# Patient Record
Sex: FEMALE | Race: WHITE | NOT HISPANIC OR LATINO | Employment: UNEMPLOYED | URBAN - METROPOLITAN AREA
[De-identification: names, ages, dates, MRNs, and addresses within clinical notes are randomized per-mention and may not be internally consistent; named-entity substitution may affect disease eponyms.]

---

## 2017-03-09 ENCOUNTER — OFFICE VISIT (OUTPATIENT)
Dept: FAMILY MEDICINE CLINIC | Facility: CLINIC | Age: 42
End: 2017-03-09

## 2017-03-09 VITALS
SYSTOLIC BLOOD PRESSURE: 128 MMHG | WEIGHT: 195 LBS | HEIGHT: 63 IN | RESPIRATION RATE: 16 BRPM | BODY MASS INDEX: 34.55 KG/M2 | HEART RATE: 76 BPM | TEMPERATURE: 99.8 F | OXYGEN SATURATION: 97 % | DIASTOLIC BLOOD PRESSURE: 80 MMHG

## 2017-03-09 DIAGNOSIS — N89.8 VAGINAL LESION: ICD-10-CM

## 2017-03-09 DIAGNOSIS — J06.9 ACUTE URI: Primary | ICD-10-CM

## 2017-03-09 LAB
EXPIRATION DATE: NORMAL
FLUAV AG NPH QL: NEGATIVE
FLUBV AG NPH QL: NEGATIVE
INTERNAL CONTROL: NORMAL
Lab: NORMAL

## 2017-03-09 PROCEDURE — 99214 OFFICE O/P EST MOD 30 MIN: CPT | Performed by: FAMILY MEDICINE

## 2017-03-09 PROCEDURE — 87804 INFLUENZA ASSAY W/OPTIC: CPT | Performed by: FAMILY MEDICINE

## 2017-03-09 NOTE — PROGRESS NOTES
"Subjective   Kenyetat Montemayor is a 41 y.o. female.     History of Present Illness   Kenyetta is here w/ 2 issues  1) Cold sx x 5 days.  Mild fever, cough and sinus pain/pressure.  She is using a sinus rinse.She  Feels she is getting worse.  2)She has noticed a lesion in her vaginal area.  She states it feels \"tingely\".I thas been prsent for about 2 weeks and is not improving. Not particularly painful but a little uncomfortable. She is sexually active in a longterm , heterosexual, monogamous relationship.    The following portions of the patient's history were reviewed and updated as appropriate: allergies, current medications, past medical history, past social history and past surgical history.    Review of Systems   HENT: Positive for congestion and sinus pressure.    Genitourinary: Positive for frequency.   All other systems reviewed and are negative.      Objective   Physical Exam   Constitutional: She is oriented to person, place, and time. She appears well-developed and well-nourished. No distress.   HENT:   Head: Normocephalic and atraumatic.   Right Ear: External ear normal.   Left Ear: External ear normal.   Nose: Nose normal.   Mouth/Throat: Oropharynx is clear and moist. No oropharyngeal exudate.   Eyes: Conjunctivae and EOM are normal. Pupils are equal, round, and reactive to light.   Neck: Normal range of motion.   Cardiovascular: Normal rate and regular rhythm.    Pulmonary/Chest: Effort normal and breath sounds normal. No stridor. No respiratory distress. She has no wheezes.   Genitourinary:   Genitourinary Comments: An area of excoriation noted at introitus with no papules or vesicles noted.   Lymphadenopathy:     She has no cervical adenopathy.   Neurological: She is alert and oriented to person, place, and time.   Skin: Skin is warm and dry. No rash noted.   Nursing note and vitals reviewed.      Assessment/Plan   Kenyetta was seen today for mass and uri.    Diagnoses and all orders for this " visit:    Acute URI  -     POCT Influenza A/B  FLu negative, advised supportive .  Vaginal lesion  I am not sure what this is but she will f/u with her gynecologist.

## 2017-03-09 NOTE — PATIENT INSTRUCTIONS
Call Dr. Parag austin ask to be seen.   Over the counter meds to treat your upper respiratory symptoms. Please call if you are not improving.

## 2017-03-13 ENCOUNTER — OFFICE VISIT (OUTPATIENT)
Dept: OBSTETRICS AND GYNECOLOGY | Age: 42
End: 2017-03-13

## 2017-03-13 VITALS
BODY MASS INDEX: 33.8 KG/M2 | DIASTOLIC BLOOD PRESSURE: 76 MMHG | HEIGHT: 64 IN | SYSTOLIC BLOOD PRESSURE: 110 MMHG | WEIGHT: 198 LBS

## 2017-03-13 DIAGNOSIS — Z80.3 FAMILY HISTORY OF BREAST CANCER IN MOTHER: ICD-10-CM

## 2017-03-13 DIAGNOSIS — N90.89 VULVAR LESION: Primary | ICD-10-CM

## 2017-03-13 PROCEDURE — 99213 OFFICE O/P EST LOW 20 MIN: CPT | Performed by: OBSTETRICS & GYNECOLOGY

## 2017-03-13 NOTE — PROGRESS NOTES
"GYN Visit    3/13/2017    Patient: Kenyetta Montemayor          MR#:[id number]      Chief Complaint   Patient presents with   • Gynecologic Exam     PT HERE BECAUSE SHE FOUND A \"VAGINAL LESION\". DOES NOT HURT, NO HX OF HERPES.       History of Present Illness    41 y.o. female No obstetric history on file. who presents for  Vulvar lesion  Saw it and looks like a small ulcer  Not itchy or tender, noticed it a week ago  Also wants to do MY RISK  Mother was tested for PMS2 but not BRCA 1 and 2        Patient's last menstrual period was 02/25/2017 (exact date).    ________________________________________  Patient Active Problem List   Diagnosis   • Family history of breast cancer in mother       Past Medical History   Diagnosis Date   • Anxiety    • Atypical squamous cells of undetermined significance (ASCUS) on Papanicolaou smear of cervix    • H/O dysmenorrhea    • H/O infertility    • H/O seasonal allergies    • Ovarian cyst    • Pelvic pain in female    • PMS (premenstrual syndrome)        Past Surgical History   Procedure Laterality Date   • Mouth surgery     • Sinus surgery         History   Smoking Status   • Never Smoker   Smokeless Tobacco   • Never Used       has a current medication list which includes the following prescription(s): azelastine, budesonide, diazepam, montelukast, and triamcinolone acetonide.  ________________________________________    Current contraception: none      The following portions of the patient's history were reviewed and updated as appropriate: allergies, current medications, past family history, past medical history, past social history, past surgical history and problem list.    Review of Systems    Pertinent items are noted in HPI.     Objective   Physical Exam    Visit Vitals   • /76   • Ht 64\" (162.6 cm)   • Wt 198 lb (89.8 kg)   • LMP 02/25/2017 (Exact Date)   • Breastfeeding No   • BMI 33.99 kg/m2      BP Readings from Last 3 Encounters:   03/13/17 110/76   03/09/17 128/80 " "  08/29/16 120/80      Wt Readings from Last 3 Encounters:   03/13/17 198 lb (89.8 kg)   03/09/17 195 lb (88.5 kg)   08/29/16 192 lb (87.1 kg)      BMI: Estimated body mass index is 33.99 kg/(m^2) as calculated from the following:    Height as of this encounter: 64\" (162.6 cm).    Weight as of this encounter: 198 lb (89.8 kg).      General:   alert, appears stated age and cooperative   Abdomen: soft, non-tender, without masses or organomegaly       Vulva: normal, area of concern appears normal.  no ulcer or leukoplakia, perhaps a prominent white line at mucosal change but no evidence lesion or ulcer   Vagina: normal mucosa                 Assessment:      Kenyetta was seen today for gynecologic exam.    Diagnoses and all orders for this visit:    Vulvar lesion    Family history of breast cancer in mother      MY RISK today  No evidence vulvar lesion, nothing to swab or biopsy, pt is reassured      "

## 2017-03-24 ENCOUNTER — TELEPHONE (OUTPATIENT)
Dept: FAMILY MEDICINE CLINIC | Facility: CLINIC | Age: 42
End: 2017-03-24

## 2017-03-24 RX ORDER — DIAZEPAM 5 MG/1
5 TABLET ORAL 2 TIMES DAILY PRN
Qty: 20 TABLET | Refills: 0 | OUTPATIENT
Start: 2017-03-24 | End: 2019-04-25 | Stop reason: SDUPTHER

## 2017-03-24 NOTE — TELEPHONE ENCOUNTER
Patient left a VM requesting RF for Valium.  FLORINDA has been scanned and placed in your folder for review.  Patient is due for an OV to discuss medications.  Controlled Sub. Contract needs to be updated at next OV.

## 2017-03-28 ENCOUNTER — TELEPHONE (OUTPATIENT)
Dept: OBSTETRICS AND GYNECOLOGY | Age: 42
End: 2017-03-28

## 2017-03-30 ENCOUNTER — TELEPHONE (OUTPATIENT)
Dept: OBSTETRICS AND GYNECOLOGY | Age: 42
End: 2017-03-30

## 2017-04-03 DIAGNOSIS — Z00.00 ROUTINE GENERAL MEDICAL EXAMINATION AT A HEALTH CARE FACILITY: Primary | ICD-10-CM

## 2017-04-03 DIAGNOSIS — Z13.220 SCREENING FOR LIPOID DISORDERS: ICD-10-CM

## 2017-04-04 ENCOUNTER — LAB (OUTPATIENT)
Dept: FAMILY MEDICINE CLINIC | Facility: CLINIC | Age: 42
End: 2017-04-04

## 2017-04-04 DIAGNOSIS — Z13.220 SCREENING FOR LIPOID DISORDERS: ICD-10-CM

## 2017-04-04 DIAGNOSIS — Z00.00 ROUTINE GENERAL MEDICAL EXAMINATION AT A HEALTH CARE FACILITY: ICD-10-CM

## 2017-04-04 LAB
ALBUMIN SERPL-MCNC: 4.1 G/DL (ref 3.5–5.2)
ALBUMIN/GLOB SERPL: 1.5 G/DL
ALP SERPL-CCNC: 57 U/L (ref 39–117)
ALT SERPL-CCNC: 25 U/L (ref 1–33)
AST SERPL-CCNC: 18 U/L (ref 1–32)
BILIRUB SERPL-MCNC: 0.2 MG/DL (ref 0.1–1.2)
BUN SERPL-MCNC: 13 MG/DL (ref 6–20)
BUN/CREAT SERPL: 18.3 (ref 7–25)
CALCIUM SERPL-MCNC: 9.4 MG/DL (ref 8.6–10.5)
CHLORIDE SERPL-SCNC: 100 MMOL/L (ref 98–107)
CHOLEST SERPL-MCNC: 236 MG/DL (ref 0–200)
CO2 SERPL-SCNC: 24.9 MMOL/L (ref 22–29)
CREAT SERPL-MCNC: 0.71 MG/DL (ref 0.57–1)
ERYTHROCYTE [DISTWIDTH] IN BLOOD BY AUTOMATED COUNT: 14 % (ref 11.7–13)
GLOBULIN SER CALC-MCNC: 2.7 GM/DL
GLUCOSE SERPL-MCNC: 96 MG/DL (ref 65–99)
HCT VFR BLD AUTO: 40 % (ref 35.6–45.5)
HDLC SERPL-MCNC: 53 MG/DL (ref 40–60)
HGB BLD-MCNC: 12.8 G/DL (ref 11.9–15.5)
LDLC SERPL CALC-MCNC: 125 MG/DL (ref 0–100)
LDLC/HDLC SERPL: 2.36 {RATIO}
MCH RBC QN AUTO: 29.6 PG (ref 26.9–32)
MCHC RBC AUTO-ENTMCNC: 32 G/DL (ref 32.4–36.3)
MCV RBC AUTO: 92.4 FL (ref 80.5–98.2)
PLATELET # BLD AUTO: 343 10*3/MM3 (ref 140–500)
POTASSIUM SERPL-SCNC: 4.5 MMOL/L (ref 3.5–5.2)
PROT SERPL-MCNC: 6.8 G/DL (ref 6–8.5)
RBC # BLD AUTO: 4.33 10*6/MM3 (ref 3.9–5.2)
SODIUM SERPL-SCNC: 139 MMOL/L (ref 136–145)
TRIGL SERPL-MCNC: 290 MG/DL (ref 0–150)
VLDLC SERPL CALC-MCNC: 58 MG/DL (ref 5–40)
WBC # BLD AUTO: 8.55 10*3/MM3 (ref 4.5–10.7)

## 2017-04-11 ENCOUNTER — OFFICE VISIT (OUTPATIENT)
Dept: FAMILY MEDICINE CLINIC | Facility: CLINIC | Age: 42
End: 2017-04-11

## 2017-04-11 VITALS
OXYGEN SATURATION: 98 % | HEART RATE: 72 BPM | WEIGHT: 200 LBS | HEIGHT: 64 IN | BODY MASS INDEX: 34.15 KG/M2 | RESPIRATION RATE: 16 BRPM

## 2017-04-11 DIAGNOSIS — Z00.00 ROUTINE GENERAL MEDICAL EXAMINATION AT A HEALTH CARE FACILITY: Primary | ICD-10-CM

## 2017-04-11 DIAGNOSIS — E78.49 OTHER HYPERLIPIDEMIA: ICD-10-CM

## 2017-04-11 PROCEDURE — 99395 PREV VISIT EST AGE 18-39: CPT | Performed by: FAMILY MEDICINE

## 2017-04-11 RX ORDER — CETIRIZINE HYDROCHLORIDE 10 MG/1
10 TABLET ORAL DAILY
COMMUNITY

## 2017-04-11 NOTE — PROGRESS NOTES
"Preventive Exam    History of Present Illness: Kenyetta is here for check up and review of routine health maintenance. She states she is doing well and has no concerns.    REVIEW OF SYSTEMS  Constitutional: Negative.    HENT: Negative.    Eyes: Negative.    Respiratory: Negative.    Cardiovascular: Negative.    Gastrointestinal: Negative.    Endocrine: Negative.    Genitourinary: Negative.    Musculoskeletal: Negative for neck stiffness.   Skin: Negative.    Allergic/Immunologic: Negative.    Neurological: Negative.    Hematological: Negative.    Psychiatric/Behavioral: Negative.    All other systems reviewed and are negative.          PHYSICAL EXAM    Vitals:    04/11/17 1115   Pulse: 72   Resp: 16   SpO2: 98%   Weight: 200 lb (90.7 kg)   Height: 64\" (162.6 cm)   BP:                     140/90  GENERAL: alert and oriented, afebrile and vital signs stable  HEENT: oral mucosa moist, PEERLA, EOM, conjunctiva normal  No cervical adenopathy  LUNGS: clear to ascultation bilaterally, no rales, ronchi or wheezing  HEART: RRR S1 S2 without murmers, thrills, rubs or gallops  CHEST WALL: within normal limits, no tenderness  BREAST EXAM: No masses, skin changes, tenderness or discharge noted  ABDOMEN: WNL. Normal BS.  EXTREMITIES: No clubbing, cyanosis or edema noted. Normal Pulses.  SKIN: warm, dry, no rashes noted  NEURO: CN II- XII grossly intact    ASSESSMENT AND PLAN  Problem List Items Addressed This Visit     None      Visit Diagnoses     Routine general medical examination at a health care facility    -  Primary    Other hyperlipidemia      I have advised diet and exercise changes. Follow up in 3 months.        Routine health maintenance reviewed and discussed with Kenyetta.    .No orders of the defined types were placed in this encounter.    Return in about 3 months (around 7/11/2017) for Recheck.  "

## 2017-04-11 NOTE — PATIENT INSTRUCTIONS
Please come back in 3 months for a weight check and cholesterol check.      Personal Prevention Plan of Service     Date of Office Visit:  2017  Encounter Provider:  Jorge Luis White MD  Place of Service:  [facility]  Patient Name: Kenyetta Montemayor  :  1975    As part of the preventive portion of your visit today, we are providing you with this personalized preventive plan of services (PPPS). This plan is based upon recommendations of the [place] Preventive Services Task Force (USPSTF) and the Advisory Committee on Immunization Practices (ACIP).    This lists the preventive care services that should be considered, and provides dates of when you are due. Items listed as completed are up-to-date and do not require any further intervention.    Health Maintenance   Topic Date Due   • LIPID PANEL  2018   • PAP SMEAR  2018   • TDAP/TD VACCINES (2 - Td) 2026   • INFLUENZA VACCINE  Addressed       No orders of the defined types were placed in this encounter.      Return in about 3 months (around 2017) for Recheck.

## 2017-04-27 ENCOUNTER — PATIENT MESSAGE (OUTPATIENT)
Dept: FAMILY MEDICINE CLINIC | Facility: CLINIC | Age: 42
End: 2017-04-27

## 2017-05-01 ENCOUNTER — TELEPHONE (OUTPATIENT)
Dept: OBSTETRICS AND GYNECOLOGY | Age: 42
End: 2017-05-01

## 2017-10-23 ENCOUNTER — OFFICE VISIT (OUTPATIENT)
Dept: OBSTETRICS AND GYNECOLOGY | Age: 42
End: 2017-10-23

## 2017-10-23 VITALS
BODY MASS INDEX: 31.82 KG/M2 | SYSTOLIC BLOOD PRESSURE: 118 MMHG | WEIGHT: 198 LBS | DIASTOLIC BLOOD PRESSURE: 76 MMHG | HEIGHT: 66 IN

## 2017-10-23 DIAGNOSIS — N94.3 PMS (PREMENSTRUAL SYNDROME): ICD-10-CM

## 2017-10-23 DIAGNOSIS — Z01.419 ENCOUNTER FOR GYNECOLOGICAL EXAMINATION WITHOUT ABNORMAL FINDING: Primary | ICD-10-CM

## 2017-10-23 PROCEDURE — 99396 PREV VISIT EST AGE 40-64: CPT | Performed by: OBSTETRICS & GYNECOLOGY

## 2017-10-23 RX ORDER — FLUOXETINE 10 MG/1
10 CAPSULE ORAL DAILY
Qty: 30 CAPSULE | Refills: 12 | Status: SHIPPED | OUTPATIENT
Start: 2017-10-23 | End: 2017-12-06 | Stop reason: SDUPTHER

## 2017-10-23 NOTE — PROGRESS NOTES
"Routine Annual Visit    10/23/2017    Patient: Kenyetta Montemayor          MR#:[id number]      Chief Complaint   Patient presents with   • Annual Exam     PT HERE FOR ROUTINE AE AND MG. SHE IS FEELING LIKE EVERY YEAR SHE GETS MORE \"CRAMPY\" AND \"PEDRO\". LAST PAP  (NEG AND NEG HPV).       History of Present Illness    42 y.o. female No obstetric history on file. who presents for annual exam.   Went to [place], had lots of travel advice  Menses regular, worseninp PMS  Will try prozac  mammo today  My risk was negative  Pap UTD          Patient's last menstrual period was 10/03/2017 (exact date).  Obstetric History:  OB History      Para Term  AB Living      0       SAB TAB Ectopic Multiple Live Births                 Menstrual History:     Patient's last menstrual period was 10/03/2017 (exact date).       Sexual History:       ________________________________________  Patient Active Problem List   Diagnosis   • Family history of breast cancer in mother       Past Medical History:   Diagnosis Date   • Allergic     many seasonal/indoor   • Allergic rhinitis    • Anxiety    • Asthma ?   • Atypical squamous cells of undetermined significance (ASCUS) on Papanicolaou smear of cervix    • Depression     very sad after my mom was diagnosed with brain cancer   • GERD (gastroesophageal reflux disease)     sometimes   • H/O dysmenorrhea    • H/O infertility    • H/O seasonal allergies    • Ovarian cyst    • Pelvic pain in female    • PMS (premenstrual syndrome)    • Scoliosis     very mild       Past Surgical History:   Procedure Laterality Date   • MOUTH SURGERY     • SEPTOPLASTY  2016   • SINUS SURGERY         History   Smoking Status   • Never Smoker   Smokeless Tobacco   • Never Used       has a current medication list which includes the following prescription(s): ALLERGY SERUM SUBLINGUAL DROPS, azelastine, budesonide, cetirizine, diazepam, fluoxetine, montelukast, and triamcinolone " "acetonide.  ________________________________________    Current contraception: none  History of abnormal Pap smear: no  Family history of Breast cancer: mother age 48  Family history of uterine or ovarian cancer: no  Family History of colon cancer/colon polyps: yes - GGF  History of abnormal mammogram: no      The following portions of the patient's history were reviewed and updated as appropriate: allergies, current medications, past family history, past medical history, past social history, past surgical history and problem list.    Review of Systems    Pertinent items are noted in HPI.     Objective   Physical Exam    /76  Ht 66\" (167.6 cm)  Wt 198 lb (89.8 kg)  LMP 10/03/2017 (Exact Date)  BMI 31.96 kg/m2   BP Readings from Last 3 Encounters:   10/23/17 118/76   03/13/17 110/76   03/09/17 128/80      Wt Readings from Last 3 Encounters:   10/23/17 198 lb (89.8 kg)   04/11/17 200 lb (90.7 kg)   03/13/17 198 lb (89.8 kg)      BMI: Estimated body mass index is 31.96 kg/(m^2) as calculated from the following:    Height as of this encounter: 66\" (167.6 cm).    Weight as of this encounter: 198 lb (89.8 kg).      General:   alert, appears stated age and cooperative   Abdomen: soft, non-tender, without masses or organomegaly   Breast: inspection negative, no nipple discharge or bleeding, no masses or nodularity palpable   Vulva: normal   Vagina: normal mucosa   Cervix: no cervical motion tenderness and no lesions   Uterus: normal size, mobile or non-tender   Adnexa: no mass, fullness, tenderness     Assessment:    1. Normal annual exam   Assessment     ICD-10-CM ICD-9-CM   1. Encounter for gynecological examination without abnormal finding Z01.419 V72.31   2. PMS (premenstrual syndrome) N94.3 625.4     Plan:    Plan     [x]  Mammogram request made  []  PAP done  []  Labs:   []  GC/Chl/TV  []  DEXA scan   []  Referral for colonoscopy:       Kenyetta was seen today for annual exam.    Diagnoses and all orders for " this visit:    Encounter for gynecological examination without abnormal finding    PMS (premenstrual syndrome)    Other orders  -     FLUoxetine (PROzac) 10 MG capsule; Take 1 capsule by mouth Daily.            Counseling:  --Nutrition: Stressed importance of moderation and caloric balance, stressed fresh fruit and vegetables  --Exercise: Stressed the importance of regular exercise. 3-5 times weekly   - Discussed screening mammogram recommendations.   --Discussed benefits of screening colonoscopy- age 50 unless FH  --Discussed pap smear screening recommendations

## 2017-12-06 ENCOUNTER — OFFICE VISIT (OUTPATIENT)
Dept: FAMILY MEDICINE CLINIC | Facility: CLINIC | Age: 42
End: 2017-12-06

## 2017-12-06 VITALS
DIASTOLIC BLOOD PRESSURE: 86 MMHG | HEART RATE: 84 BPM | OXYGEN SATURATION: 98 % | BODY MASS INDEX: 31.5 KG/M2 | WEIGHT: 196 LBS | HEIGHT: 66 IN | SYSTOLIC BLOOD PRESSURE: 136 MMHG

## 2017-12-06 DIAGNOSIS — J06.9 ACUTE URI: Primary | ICD-10-CM

## 2017-12-06 DIAGNOSIS — J45.20 MILD INTERMITTENT ASTHMA, UNSPECIFIED WHETHER COMPLICATED: ICD-10-CM

## 2017-12-06 PROBLEM — J45.909 ASTHMA: Status: ACTIVE | Noted: 2017-12-06

## 2017-12-06 PROCEDURE — 99213 OFFICE O/P EST LOW 20 MIN: CPT | Performed by: FAMILY MEDICINE

## 2017-12-06 RX ORDER — AZITHROMYCIN 250 MG/1
TABLET, FILM COATED ORAL
Qty: 6 TABLET | Refills: 0 | Status: SHIPPED | OUTPATIENT
Start: 2017-12-06 | End: 2018-10-29

## 2017-12-07 RX ORDER — FLUOXETINE 10 MG/1
CAPSULE ORAL
Qty: 30 CAPSULE | Refills: 0 | Status: SHIPPED | OUTPATIENT
Start: 2017-12-07 | End: 2018-10-29 | Stop reason: SDUPTHER

## 2018-10-29 ENCOUNTER — APPOINTMENT (OUTPATIENT)
Dept: WOMENS IMAGING | Facility: HOSPITAL | Age: 43
End: 2018-10-29

## 2018-10-29 ENCOUNTER — OFFICE VISIT (OUTPATIENT)
Dept: OBSTETRICS AND GYNECOLOGY | Age: 43
End: 2018-10-29

## 2018-10-29 ENCOUNTER — PROCEDURE VISIT (OUTPATIENT)
Dept: OBSTETRICS AND GYNECOLOGY | Age: 43
End: 2018-10-29

## 2018-10-29 VITALS
BODY MASS INDEX: 33.27 KG/M2 | HEIGHT: 66 IN | SYSTOLIC BLOOD PRESSURE: 125 MMHG | DIASTOLIC BLOOD PRESSURE: 72 MMHG | WEIGHT: 207 LBS

## 2018-10-29 DIAGNOSIS — Z12.31 VISIT FOR SCREENING MAMMOGRAM: Primary | ICD-10-CM

## 2018-10-29 DIAGNOSIS — Z01.419 ENCOUNTER FOR GYNECOLOGICAL EXAMINATION WITHOUT ABNORMAL FINDING: Primary | ICD-10-CM

## 2018-10-29 PROCEDURE — 99396 PREV VISIT EST AGE 40-64: CPT | Performed by: OBSTETRICS & GYNECOLOGY

## 2018-10-29 PROCEDURE — 77067 SCR MAMMO BI INCL CAD: CPT | Performed by: OBSTETRICS & GYNECOLOGY

## 2018-10-29 PROCEDURE — 77067 SCR MAMMO BI INCL CAD: CPT | Performed by: RADIOLOGY

## 2018-10-29 RX ORDER — FLUOXETINE 10 MG/1
10 CAPSULE ORAL DAILY
Qty: 90 CAPSULE | Refills: 3 | Status: SHIPPED | OUTPATIENT
Start: 2018-10-29 | End: 2019-11-19 | Stop reason: SDUPTHER

## 2018-10-29 NOTE — PROGRESS NOTES
Routine Annual Visit    10/29/2018    Patient: Kenyetta Montemayor          MR#:[id number]      Chief Complaint   Patient presents with   • Gynecologic Exam     annual visit .mmg today       History of Present Illness    43 y.o. female  who presents for annual exam.   mammo today  Pap UTD- due next year  Menses are regular but heavy with clots  Was using the cup but got stuck twice and now only pads  Receptive to kyleena as option to control cycles   with VAS  Likes prozac and wants to continue          Patient's last menstrual period was 10/05/2018.  Obstetric History:  OB History      Para Term  AB Living    0 0 0 0 0 0    SAB TAB Ectopic Molar Multiple Live Births    0 0 0 0 0 0         Menstrual History:     Patient's last menstrual period was 10/05/2018.       Sexual History:       ________________________________________  Patient Active Problem List   Diagnosis   • Family history of breast cancer in mother   • Asthma       Past Medical History:   Diagnosis Date   • Allergic     many seasonal/indoor   • Allergic rhinitis    • Anxiety    • Asthma ?   • Atypical squamous cells of undetermined significance (ASCUS) on Papanicolaou smear of cervix    • Depression     very sad after my mom was diagnosed with brain cancer   • GERD (gastroesophageal reflux disease)     sometimes   • H/O dysmenorrhea    • H/O infertility    • H/O seasonal allergies    • Ovarian cyst    • Pelvic pain in female    • PMS (premenstrual syndrome)    • Scoliosis     very mild       Past Surgical History:   Procedure Laterality Date   • MOUTH SURGERY     • SEPTOPLASTY  2016   • SINUS SURGERY         History   Smoking Status   • Never Smoker   Smokeless Tobacco   • Never Used       has a current medication list which includes the following prescription(s): ALLERGY SERUM SUBLINGUAL DROPS, azelastine, budesonide, cetirizine, diazepam, fluoxetine, montelukast, and triamcinolone  "acetonide.  ________________________________________    Current contraception: vasectomy  History of abnormal Pap smear: no  Family history of Breast cancer: yes, pt tested my risk neg  Family history of uterine or ovarian cancer: no  Family History of colon cancer/colon polyps: no  History of abnormal mammogram: no      The following portions of the patient's history were reviewed and updated as appropriate: allergies, current medications, past family history, past medical history, past social history, past surgical history and problem list.    Review of Systems    Pertinent items are noted in HPI.     Objective   Physical Exam    /72   Ht 167.6 cm (65.98\")   Wt 93.9 kg (207 lb)   LMP 10/05/2018   BMI 33.43 kg/m²    BP Readings from Last 3 Encounters:   10/29/18 125/72   12/06/17 136/86   10/23/17 118/76      Wt Readings from Last 3 Encounters:   10/29/18 93.9 kg (207 lb)   12/06/17 88.9 kg (196 lb)   10/23/17 89.8 kg (198 lb)      BMI: Estimated body mass index is 33.43 kg/m² as calculated from the following:    Height as of this encounter: 167.6 cm (65.98\").    Weight as of this encounter: 93.9 kg (207 lb).      General:   alert, appears stated age and cooperative   Abdomen: soft, non-tender, without masses or organomegaly   Breast: inspection negative, no nipple discharge or bleeding, no masses or nodularity palpable   Vulva: normal   Vagina: normal mucosa   Cervix: no cervical motion tenderness and no lesions   Uterus: normal size, mobile, non-tender or retroverted   Adnexa: no mass, fullness, tenderness     Assessment:    1. Normal annual exam   Assessment     ICD-10-CM ICD-9-CM   1. Encounter for gynecological examination without abnormal finding Z01.419 V72.31     Plan:    Plan     [x]  Mammogram request made  []  PAP done  []  Labs:   []  GC/Chl/TV  []  DEXA scan   []  Referral for colonoscopy:       Kenyetta was seen today for gynecologic exam.    Diagnoses and all orders for this " visit:    Encounter for gynecological examination without abnormal finding    Other orders  -     FLUoxetine (PROzac) 10 MG capsule; Take 1 capsule by mouth Daily.            Counseling:  --Nutrition: Stressed importance of moderation and caloric balance, stressed fresh fruit and vegetables  --Exercise: Stressed the importance of regular exercise. 3-5 times weekly   - Discussed screening mammogram recommendations.   --Discussed benefits of screening colonoscopy- age 45 unless FH  --Discussed pap smear screening recommendations

## 2018-12-05 ENCOUNTER — OFFICE VISIT (OUTPATIENT)
Dept: OBSTETRICS AND GYNECOLOGY | Age: 43
End: 2018-12-05

## 2018-12-05 VITALS
SYSTOLIC BLOOD PRESSURE: 142 MMHG | WEIGHT: 206 LBS | DIASTOLIC BLOOD PRESSURE: 82 MMHG | BODY MASS INDEX: 34.32 KG/M2 | HEIGHT: 65 IN

## 2018-12-05 DIAGNOSIS — Z30.430 ENCOUNTER FOR IUD INSERTION: Primary | ICD-10-CM

## 2018-12-05 LAB
B-HCG UR QL: NEGATIVE
INTERNAL NEGATIVE CONTROL: NEGATIVE
INTERNAL POSITIVE CONTROL: POSITIVE
Lab: NORMAL

## 2018-12-05 PROCEDURE — 81025 URINE PREGNANCY TEST: CPT | Performed by: OBSTETRICS & GYNECOLOGY

## 2018-12-05 PROCEDURE — 58300 INSERT INTRAUTERINE DEVICE: CPT | Performed by: OBSTETRICS & GYNECOLOGY

## 2018-12-05 NOTE — PROGRESS NOTES
GYN Visit    2018    Patient: Kenyetta Montemayor          MR#:[id number]      Chief Complaint   Patient presents with   • Contraception     PT HERE FOR KYLEENA INSERT, SHE IS WELL.       History of Present Illness    43 y.o. female  who presents for  IUD insert to control heavy cycles  husb with vas        Patient's last menstrual period was 2018.    ________________________________________  Patient Active Problem List   Diagnosis   • Family history of breast cancer in mother   • Asthma       Past Medical History:   Diagnosis Date   • Allergic     many seasonal/indoor   • Allergic rhinitis    • Anxiety    • Asthma ?   • Atypical squamous cells of undetermined significance (ASCUS) on Papanicolaou smear of cervix    • Depression     very sad after my mom was diagnosed with brain cancer   • GERD (gastroesophageal reflux disease)     sometimes   • H/O dysmenorrhea    • H/O infertility    • H/O seasonal allergies    • Ovarian cyst    • Pelvic pain in female    • PMS (premenstrual syndrome)    • Scoliosis     very mild       Past Surgical History:   Procedure Laterality Date   • MOUTH SURGERY     • SEPTOPLASTY  2016   • SINUS SURGERY         Social History     Tobacco Use   Smoking Status Never Smoker   Smokeless Tobacco Never Used       has a current medication list which includes the following prescription(s): ALLERGY SERUM SUBLINGUAL DROPS, azelastine, budesonide, cetirizine, diazepam, fluoxetine, montelukast, and triamcinolone acetonide, and the following Facility-Administered Medications: levonorgestrel.  ________________________________________    Current contraception: vasectomy      The following portions of the patient's history were reviewed and updated as appropriate: allergies, current medications, past family history, past medical history, past social history, past surgical history and problem list.    Review of Systems    Pertinent items are noted in HPI.     Objective   Physical  "Exam    /82   Ht 165.1 cm (65\")   Wt 93.4 kg (206 lb)   LMP 12/01/2018   BMI 34.28 kg/m²    BP Readings from Last 3 Encounters:   12/05/18 142/82   10/29/18 125/72   12/06/17 136/86      Wt Readings from Last 3 Encounters:   12/05/18 93.4 kg (206 lb)   10/29/18 93.9 kg (207 lb)   12/06/17 88.9 kg (196 lb)      BMI: Estimated body mass index is 34.28 kg/m² as calculated from the following:    Height as of this encounter: 165.1 cm (65\").    Weight as of this encounter: 93.4 kg (206 lb).    .IUD Insertion Procedure Note    Pre-operative Diagnosis: desires IUD    Post-operative Diagnosis: same    Indications: contraception    Procedure Details   Urine pregnancy test was done and negative.  The risks (including infection, bleeding, pain, and uterine perforation) and benefits of the procedure were explained to the patient and Written informed consent was obtained.      Cervix cleansed with Betadine. Uterus sounded to 9 cm. IUD inserted without difficulty. String visible and trimmed.    IUD Information:  Kyleena.    Condition:  Stable    Complications:  None  Patient tolerated the procedure well without complications.    Plan:    The patient was advised to call for any fever or for prolonged or severe pain or bleeding. She was advised to use OTC ibuprofen as needed for mild to moderate pain.     Attending Physician Documentation:  I was present for or participated in the entire procedure, including opening and closing.    Assessment:      Kenyetta was seen today for contraception.    Diagnoses and all orders for this visit:    Encounter for IUD insertion  -     levonorgestrel (KYLEENA) 19.5 MG IUD 1 each; 1 each by Intrauterine route 1 (One) Time.        Follow up 4 weeks for IUD check      "

## 2019-01-03 ENCOUNTER — OFFICE VISIT (OUTPATIENT)
Dept: OBSTETRICS AND GYNECOLOGY | Age: 44
End: 2019-01-03

## 2019-01-03 ENCOUNTER — PROCEDURE VISIT (OUTPATIENT)
Dept: OBSTETRICS AND GYNECOLOGY | Age: 44
End: 2019-01-03

## 2019-01-03 VITALS
BODY MASS INDEX: 34.66 KG/M2 | HEIGHT: 65 IN | DIASTOLIC BLOOD PRESSURE: 70 MMHG | SYSTOLIC BLOOD PRESSURE: 112 MMHG | WEIGHT: 208 LBS

## 2019-01-03 DIAGNOSIS — R10.2 PELVIC PAIN: ICD-10-CM

## 2019-01-03 DIAGNOSIS — D25.1 INTRAMURAL LEIOMYOMA OF UTERUS: ICD-10-CM

## 2019-01-03 DIAGNOSIS — Z30.431 IUD CHECK UP: Primary | ICD-10-CM

## 2019-01-03 PROCEDURE — 99213 OFFICE O/P EST LOW 20 MIN: CPT | Performed by: OBSTETRICS & GYNECOLOGY

## 2019-01-03 PROCEDURE — 76830 TRANSVAGINAL US NON-OB: CPT | Performed by: OBSTETRICS & GYNECOLOGY

## 2019-01-03 NOTE — PROGRESS NOTES
GYN Visit    1/3/2019    Patient: Kenyetta Montemayor          MR#:[id number]      Chief Complaint   Patient presents with   • Follow-up     PT HERE FOR ROUTINE 4 WEEK IUD CHECK, SHE IS HAVING A LOT OF PAIN WITH IT.        History of Present Illness    43 y.o. female  who presents for  IUD check  Had 3 weeks of moderate cramping and last 2 days better  Unsure if she wants to keep this  Strings a little long as well  No unusual bleeding  No fever or chills or foul discharge          No LMP recorded. Patient is not currently having periods (Reason: Other).    ________________________________________  Patient Active Problem List   Diagnosis   • Family history of breast cancer in mother   • Asthma       Past Medical History:   Diagnosis Date   • Allergic     many seasonal/indoor   • Allergic rhinitis    • Anxiety    • Asthma ?   • Atypical squamous cells of undetermined significance (ASCUS) on Papanicolaou smear of cervix    • Depression     very sad after my mom was diagnosed with brain cancer   • GERD (gastroesophageal reflux disease)     sometimes   • H/O dysmenorrhea    • H/O infertility    • H/O seasonal allergies    • Ovarian cyst    • Pelvic pain in female    • PMS (premenstrual syndrome)    • Scoliosis     very mild       Past Surgical History:   Procedure Laterality Date   • MOUTH SURGERY     • SEPTOPLASTY  2016   • SINUS SURGERY         Social History     Tobacco Use   Smoking Status Never Smoker   Smokeless Tobacco Never Used       has a current medication list which includes the following prescription(s): ALLERGY SERUM SUBLINGUAL DROPS, azelastine, budesonide, cetirizine, diazepam, fluoxetine, levonorgestrel, montelukast, and triamcinolone acetonide.  ________________________________________    Current contraception: vasectomy      The following portions of the patient's history were reviewed and updated as appropriate: allergies, current medications, past family history, past medical  "history, past social history, past surgical history and problem list.    Review of Systems   Constitutional: Negative for chills, fatigue and fever.   Gastrointestinal: Positive for nausea. Negative for vomiting.   Genitourinary: Positive for pelvic pain and vaginal bleeding. Negative for dyspareunia, dysuria and vaginal discharge.   Psychiatric/Behavioral: Negative for dysphoric mood.   All other systems reviewed and are negative.      Pertinent items are noted in HPI.     Objective   Physical Exam    /70   Ht 165.1 cm (65\")   Wt 94.3 kg (208 lb)   BMI 34.61 kg/m²    BP Readings from Last 3 Encounters:   01/03/19 112/70   12/05/18 142/82   10/29/18 125/72      Wt Readings from Last 3 Encounters:   01/03/19 94.3 kg (208 lb)   12/05/18 93.4 kg (206 lb)   10/29/18 93.9 kg (207 lb)      BMI: Estimated body mass index is 34.61 kg/m² as calculated from the following:    Height as of this encounter: 165.1 cm (65\").    Weight as of this encounter: 94.3 kg (208 lb).    Lungs: non labored breathing, no wheezing or tachpnea  Extremities: extremities normal, atraumatic, no cyanosis or edema  Skin: Skin color, texture, turgor normal. No rashes or lesions  Neurologic: Grossly normal  General:   alert, appears stated age and cooperative   Abdomen: soft, non-tender, without masses or organomegaly       Vulva: normal   Vagina: normal mucosa   Cervix: no cervical motion tenderness   Uterus: normal size, mobile or non-tender   Adnexa: no mass, fullness, tenderness     Assessment:      Kenyetta was seen today for follow-up.    Diagnoses and all orders for this visit:    IUD check up    Pelvic pain    Intramural leiomyoma of uterus      See US- IUD in place  Ovaries are normal  3 small intramural fibroids     Pt will observe, last 2 days better and she is willing to observe a little longer, kyleena is in good placement        "

## 2019-04-23 DIAGNOSIS — Z00.00 ROUTINE GENERAL MEDICAL EXAMINATION AT A HEALTH CARE FACILITY: Primary | ICD-10-CM

## 2019-04-23 DIAGNOSIS — Z13.220 SCREENING FOR LIPOID DISORDERS: ICD-10-CM

## 2019-04-23 LAB
ALBUMIN SERPL-MCNC: 4.4 G/DL (ref 3.5–5.2)
ALBUMIN/GLOB SERPL: 1.5 G/DL
ALP SERPL-CCNC: 63 U/L (ref 39–117)
ALT SERPL-CCNC: 27 U/L (ref 1–33)
AST SERPL-CCNC: 18 U/L (ref 1–32)
BILIRUB SERPL-MCNC: 0.3 MG/DL (ref 0.2–1.2)
BUN SERPL-MCNC: 11 MG/DL (ref 6–20)
BUN/CREAT SERPL: 13.9 (ref 7–25)
CALCIUM SERPL-MCNC: 9.7 MG/DL (ref 8.6–10.5)
CHLORIDE SERPL-SCNC: 103 MMOL/L (ref 98–107)
CHOLEST SERPL-MCNC: 240 MG/DL (ref 0–200)
CO2 SERPL-SCNC: 24.4 MMOL/L (ref 22–29)
CREAT SERPL-MCNC: 0.79 MG/DL (ref 0.57–1)
ERYTHROCYTE [DISTWIDTH] IN BLOOD BY AUTOMATED COUNT: 14.1 % (ref 12.3–15.4)
GLOBULIN SER CALC-MCNC: 2.9 GM/DL
GLUCOSE SERPL-MCNC: 103 MG/DL (ref 65–99)
HCT VFR BLD AUTO: 42.4 % (ref 34–46.6)
HDLC SERPL-MCNC: 57 MG/DL (ref 40–60)
HGB BLD-MCNC: 13.7 G/DL (ref 12–15.9)
LDLC SERPL CALC-MCNC: 155 MG/DL (ref 0–100)
LDLC/HDLC SERPL: 2.73 {RATIO}
MCH RBC QN AUTO: 30 PG (ref 26.6–33)
MCHC RBC AUTO-ENTMCNC: 32.3 G/DL (ref 31.5–35.7)
MCV RBC AUTO: 93 FL (ref 79–97)
PLATELET # BLD AUTO: 334 10*3/MM3 (ref 140–450)
POTASSIUM SERPL-SCNC: 4.8 MMOL/L (ref 3.5–5.2)
PROT SERPL-MCNC: 7.3 G/DL (ref 6–8.5)
RBC # BLD AUTO: 4.56 10*6/MM3 (ref 3.77–5.28)
SODIUM SERPL-SCNC: 138 MMOL/L (ref 136–145)
TRIGL SERPL-MCNC: 138 MG/DL (ref 0–150)
VLDLC SERPL CALC-MCNC: 27.6 MG/DL
WBC # BLD AUTO: 8.68 10*3/MM3 (ref 3.4–10.8)

## 2019-04-25 ENCOUNTER — OFFICE VISIT (OUTPATIENT)
Dept: FAMILY MEDICINE CLINIC | Facility: CLINIC | Age: 44
End: 2019-04-25

## 2019-04-25 VITALS
WEIGHT: 212 LBS | HEIGHT: 65 IN | SYSTOLIC BLOOD PRESSURE: 170 MMHG | RESPIRATION RATE: 16 BRPM | BODY MASS INDEX: 35.32 KG/M2 | DIASTOLIC BLOOD PRESSURE: 100 MMHG | HEART RATE: 72 BPM | OXYGEN SATURATION: 98 %

## 2019-04-25 DIAGNOSIS — J45.909 UNCOMPLICATED ASTHMA, UNSPECIFIED ASTHMA SEVERITY, UNSPECIFIED WHETHER PERSISTENT: ICD-10-CM

## 2019-04-25 DIAGNOSIS — Z00.00 ROUTINE GENERAL MEDICAL EXAMINATION AT A HEALTH CARE FACILITY: Primary | ICD-10-CM

## 2019-04-25 DIAGNOSIS — R03.0 ELEVATED BLOOD PRESSURE READING: ICD-10-CM

## 2019-04-25 DIAGNOSIS — F41.9 ANXIETY: ICD-10-CM

## 2019-04-25 DIAGNOSIS — Z87.42 H/O DYSMENORRHEA: ICD-10-CM

## 2019-04-25 PROCEDURE — 99396 PREV VISIT EST AGE 40-64: CPT | Performed by: FAMILY MEDICINE

## 2019-04-25 RX ORDER — MONTELUKAST SODIUM 10 MG/1
10 TABLET ORAL NIGHTLY
Qty: 90 TABLET | Refills: 2 | Status: SHIPPED | OUTPATIENT
Start: 2019-04-25 | End: 2019-12-07 | Stop reason: SDUPTHER

## 2019-04-25 RX ORDER — DIAZEPAM 5 MG/1
5 TABLET ORAL 2 TIMES DAILY PRN
Qty: 20 TABLET | Refills: 0 | OUTPATIENT
Start: 2019-04-25 | End: 2019-07-10 | Stop reason: SDUPTHER

## 2019-04-25 NOTE — PROGRESS NOTES
"Preventive Exam    History of Present Illness: Kenyetta is here for check up and review of routine health maintenance. She states she is doing well and has no concerns.  She has elevated B/P in the office today but notes that she has a pretty significant sinus headache.  She has a hx of flying anxiety and needs the occasional valium to help with that.  She has a hx of chronic premenstrual dysmenorrhea and takes Prozac to help with this.   She has a hx of chronic asthma and is on multiple med to manage allergies and asthma.    REVIEW OF SYSTEMS  Constitutional: Negative.    HENT: Negative.    Eyes: Negative.    Respiratory: Negative.    Cardiovascular: Negative.    Gastrointestinal: Negative.    Endocrine: Negative.    Genitourinary: Negative.    Musculoskeletal: Negative.  Skin: Negative.    Allergic/Immunologic: Negative.    Neurological: headache   Hematological: Negative.    Psychiatric/Behavioral: Negative.    All other systems reviewed and are negative.          PHYSICAL EXAM    Vitals:    04/25/19 1532   BP: 170/100   Pulse: 72   Resp: 16   SpO2: 98%   Weight: 96.2 kg (212 lb)   Height: 165.1 cm (65\")     GENERAL: alert and oriented, afebrile and vital signs stable  HEENT: oral mucosa moist, PEERLA, EOM, conjunctiva normal  No cervical adenopathy  LUNGS: clear to ascultation bilaterally, no rales, ronchi or wheezing  HEART: RRR S1 S2 without murmers, thrills, rubs or gallops  CHEST WALL: within normal limits, no tenderness  ABDOMEN: WNL. Normal BS.  EXTREMITIES: No clubbing, cyanosis or edema noted. Normal Pulses.  SKIN: warm, dry, no rashes noted  NEURO: CN II- XII grossly intact    ASSESSMENT AND PLAN  Problem List Items Addressed This Visit        Respiratory    Asthma  This is a chronic problem that has been well managed on current medications. Will refill as needed.     Relevant Medications    montelukast (SINGULAIR) 10 MG tablet       Other    H/O dysmenorrhea  She does well with a small dose of Prozac " and I will refill as needed      Anxiety  Will refill today for upcoming trips.     Relevant Medications    diazePAM (VALIUM) 5 MG tablet      Other Visit Diagnoses     Routine general medical examination at a health care facility    -  Primary  Labs reviewed and on chart.      Elevated blood pressure reading      This is a new finding and may be due to headache. She will RTO in 2 weeks for a   B/P check        Routine health maintenance reviewed and discussed with Kenyetta.    .No orders of the defined types were placed in this encounter.    Return in about 2 weeks (around 5/9/2019) for B/P check.

## 2019-04-25 NOTE — PATIENT INSTRUCTIONS
Annual Wellness  Personal Prevention Plan of Service   Please come back in 2-3 weeks for a b/p check.  Date of Office Visit:  2019  Encounter Provider:  Jorge Luis White MD  Place of Service:  [facility]  Patient Name: Kenyetta Montemayor  :  1975    As part of the Annual Wellness portion of your visit today, we are providing you with this personalized preventive plan of services (PPPS). This plan is based upon recommendations of the [place] Preventive Services Task Force (USPSTF) and the Advisory Committee on Immunization Practices (ACIP).    This lists the preventive care services that should be considered, and provides dates of when you are due. Items listed as completed are up-to-date and do not require any further intervention.    Health Maintenance   Topic Date Due   • INFLUENZA VACCINE  2019   • PAP SMEAR  2019   • LIPID PANEL  2020   • ANNUAL PHYSICAL  2020   • TDAP/TD VACCINES (2 - Td) 2026       No orders of the defined types were placed in this encounter.      Return in about 2 weeks (around 2019) for B/P check.

## 2019-04-26 ENCOUNTER — TELEPHONE (OUTPATIENT)
Dept: OBSTETRICS AND GYNECOLOGY | Age: 44
End: 2019-04-26

## 2019-04-26 NOTE — TELEPHONE ENCOUNTER
Pt called and wanted to schedule to have her iud removed.  She is having a lot of issues with it .

## 2019-04-30 ENCOUNTER — OFFICE VISIT (OUTPATIENT)
Dept: OBSTETRICS AND GYNECOLOGY | Age: 44
End: 2019-04-30

## 2019-04-30 VITALS
SYSTOLIC BLOOD PRESSURE: 122 MMHG | WEIGHT: 209 LBS | HEIGHT: 65 IN | BODY MASS INDEX: 34.82 KG/M2 | DIASTOLIC BLOOD PRESSURE: 74 MMHG

## 2019-04-30 DIAGNOSIS — Z30.432 ENCOUNTER FOR IUD REMOVAL: Primary | ICD-10-CM

## 2019-04-30 PROCEDURE — 58301 REMOVE INTRAUTERINE DEVICE: CPT | Performed by: PHYSICIAN ASSISTANT

## 2019-04-30 NOTE — PROGRESS NOTES
"IUD Removal    Date of procedure:  4/30/2019    Risks and benefits discussed? yes  All questions answered? yes  Consents given by The patient  Reason for removal: Side effect: pain and bleeding        Procedure documentation:    A speculum was placed in order to view the cervix.  .  The IUD string was easily seen.  The string was grasped and the IUD was removed without difficulty.  The IUD did not appear to be adherent to the uterine cavity. It was removed intact.    She tolerated the procedure without any difficulty.     Post procedure instructions: Patient notified to call with heavy bleeding, fever or increasing pain.    Follow up needed: prn     Disc alternate options for cycle control, POP, combo OCP or ablation  She did not tolerate OCP\"s, unsure if she wants to try another pill but would be interested in the ablation  Gave ARUN and disc that she would need to schedule a pre op discussion with Dr Tuttle to determine if she is a good candidate  Pt does not required BC as  had vasectomy          "

## 2019-05-08 ENCOUNTER — OFFICE VISIT (OUTPATIENT)
Dept: FAMILY MEDICINE CLINIC | Facility: CLINIC | Age: 44
End: 2019-05-08

## 2019-05-08 VITALS
BODY MASS INDEX: 38.46 KG/M2 | SYSTOLIC BLOOD PRESSURE: 124 MMHG | HEART RATE: 88 BPM | HEIGHT: 62 IN | RESPIRATION RATE: 16 BRPM | WEIGHT: 209 LBS | DIASTOLIC BLOOD PRESSURE: 80 MMHG | OXYGEN SATURATION: 98 %

## 2019-05-08 DIAGNOSIS — R03.0 SINGLE EPISODE OF ELEVATED BLOOD PRESSURE: Primary | ICD-10-CM

## 2019-05-08 PROCEDURE — 99212 OFFICE O/P EST SF 10 MIN: CPT | Performed by: FAMILY MEDICINE

## 2019-05-08 NOTE — PROGRESS NOTES
Subjective   Kenyetta Montemayor is a 43 y.o. female.     History of Present Illness   Kenyetta is here for bp check.  She states bp was checked at gyn office after her appt here and was wnl.  She states she had a severe sinus headache at her last visit but after the weather system moved through she felt much better.    The following portions of the patient's history were reviewed and updated as appropriate: allergies, current medications, past family history, past medical history, past social history, past surgical history and problem list.    Review of Systems   Eyes: Negative for visual disturbance.   Neurological: Negative for dizziness and headache.   All other systems reviewed and are negative.      Objective   Physical Exam   Constitutional: She appears well-developed and well-nourished.   Cardiovascular: Normal rate and regular rhythm.   Pulmonary/Chest: Effort normal.   Neurological: She is alert.   Skin: Skin is warm and dry. No rash noted.   Nursing note and vitals reviewed.        Assessment/Plan   Kenyetta was seen today for hypertension.    Diagnoses and all orders for this visit:    Single episode of elevated blood pressure    Her B/P is normal in the office today.

## 2019-07-10 DIAGNOSIS — F41.9 ANXIETY: ICD-10-CM

## 2019-07-10 RX ORDER — DIAZEPAM 5 MG/1
5 TABLET ORAL EVERY 8 HOURS PRN
Qty: 20 TABLET | Refills: 0 | Status: SHIPPED | OUTPATIENT
Start: 2019-07-10 | End: 2020-02-03

## 2019-07-10 RX ORDER — DIAZEPAM 5 MG/1
5 TABLET ORAL 2 TIMES DAILY PRN
Qty: 20 TABLET | Refills: 0 | OUTPATIENT
Start: 2019-07-10 | End: 2019-07-10 | Stop reason: SDUPTHER

## 2019-11-11 ENCOUNTER — APPOINTMENT (OUTPATIENT)
Dept: WOMENS IMAGING | Facility: HOSPITAL | Age: 44
End: 2019-11-11

## 2019-11-11 ENCOUNTER — PROCEDURE VISIT (OUTPATIENT)
Dept: OBSTETRICS AND GYNECOLOGY | Age: 44
End: 2019-11-11

## 2019-11-11 ENCOUNTER — OFFICE VISIT (OUTPATIENT)
Dept: OBSTETRICS AND GYNECOLOGY | Age: 44
End: 2019-11-11

## 2019-11-11 VITALS
DIASTOLIC BLOOD PRESSURE: 98 MMHG | BODY MASS INDEX: 38.64 KG/M2 | SYSTOLIC BLOOD PRESSURE: 150 MMHG | HEIGHT: 62 IN | WEIGHT: 210 LBS

## 2019-11-11 DIAGNOSIS — Z12.4 PAP SMEAR FOR CERVICAL CANCER SCREENING: ICD-10-CM

## 2019-11-11 DIAGNOSIS — Z01.419 ENCOUNTER FOR GYNECOLOGICAL EXAMINATION (GENERAL) (ROUTINE) WITHOUT ABNORMAL FINDINGS: Primary | ICD-10-CM

## 2019-11-11 DIAGNOSIS — Z11.51 SCREENING FOR HPV (HUMAN PAPILLOMAVIRUS): ICD-10-CM

## 2019-11-11 DIAGNOSIS — Z12.31 VISIT FOR SCREENING MAMMOGRAM: Primary | ICD-10-CM

## 2019-11-11 DIAGNOSIS — N92.0 MENORRHAGIA WITH REGULAR CYCLE: ICD-10-CM

## 2019-11-11 PROCEDURE — 77067 SCR MAMMO BI INCL CAD: CPT | Performed by: RADIOLOGY

## 2019-11-11 PROCEDURE — 77067 SCR MAMMO BI INCL CAD: CPT | Performed by: OBSTETRICS & GYNECOLOGY

## 2019-11-11 PROCEDURE — 99396 PREV VISIT EST AGE 40-64: CPT | Performed by: OBSTETRICS & GYNECOLOGY

## 2019-11-11 RX ORDER — INFLUENZA A VIRUS A/SINGAPORE/GP1908/2015 IVR-180 (H1N1) ANTIGEN (MDCK CELL DERIVED, PROPIOLACTONE INACTIVATED), INFLUENZA A VIRUS A/NORTH CAROLINA/04/2016 (H3N2) HEMAGGLUTININ ANTIGEN (MDCK CELL DERIVED, PROPIOLACTONE INACTIVATED), INFLUENZA B VIRUS B/IOWA/06/2017 HEMAGGLUTININ ANTIGEN (MDCK CELL DERIVED, PROPIOLACTONE INACTIVATED), INFLUENZA B VIRUS B/SINGAPORE/INFTT-16-0610/2016 HEMAGGLUTININ ANTIGEN (MDCK CELL DERIVED, PROPIOLACTONE INACTIVATED) 15; 15; 15; 15 UG/.5ML; UG/.5ML; UG/.5ML; UG/.5ML
INJECTION, SUSPENSION INTRAMUSCULAR
COMMUNITY
Start: 2019-11-10 | End: 2020-12-15

## 2019-11-11 NOTE — PROGRESS NOTES
Routine Annual Visit    2019    Patient: Kenyetta Montemayor          MR#:[id number]      Chief Complaint   Patient presents with   • Gynecologic Exam     AE AND MG TODAY BEFORE EXAM. INTERESTED IN ABLATION.   PAP SMEAR = NEG.  KYLEENA REMOVED DID NOT HELP WITH CYCLES.  SPOUSE HAD VASECTOMY.  LMP 2019, 28 DAYS APART, LASTING 5 -6 DAYS, HEAVY FOR TWO DAYS.  NEW FAM HX - FATHER REOCCURENCE OF PROSTATE CANCER.        History of Present Illness    44 y.o. female  who presents for annual exam.   Due for pap  kyleena did not work  Wants ablation  See US done in 2019  Some small fibroids  mammo today  Had an episode of flooding when shopping          Patient's last menstrual period was 2019.  Obstetric History:  OB History      Para Term  AB Living    0 0 0 0 0 0    SAB TAB Ectopic Molar Multiple Live Births    0 0 0 0 0 0         Menstrual History:     Patient's last menstrual period was 2019.       Sexual History:       ________________________________________  Patient Active Problem List   Diagnosis   • Family history of breast cancer in mother   • Asthma   • H/O dysmenorrhea   • Anxiety       Past Medical History:   Diagnosis Date   • Allergic     many seasonal/indoor   • Allergic rhinitis    • Anxiety    • Asthma ?   • Atypical squamous cells of undetermined significance (ASCUS) on Papanicolaou smear of cervix    • Depression     very sad after my mom was diagnosed with brain cancer   • Encounter for IUD removal    • GERD (gastroesophageal reflux disease)     sometimes   • H/O dysmenorrhea    • H/O infertility    • H/O seasonal allergies    • Ovarian cyst    • Pelvic pain in female    • PMS (premenstrual syndrome)    • Scoliosis     very mild       Past Surgical History:   Procedure Laterality Date   • MOUTH SURGERY     • SEPTOPLASTY  2016   • SINUS SURGERY         Social History     Tobacco Use   Smoking Status Never Smoker   Smokeless Tobacco Never Used  "      has a current medication list which includes the following prescription(s): ALLERGY SERUM SUBLINGUAL DROPS, azelastine, budesonide, cetirizine, vitamin d, diazepam, flucelvax quadrivalent, fluoxetine, msm, montelukast, multiple vitamins-minerals, and triamcinolone acetonide.  ________________________________________    Current contraception: vasectomy  History of abnormal Pap smear: no  Family history of Breast cancer: yes, mother and paternal aunt, pt tested brca neg  Family history of uterine or ovarian cancer: no  Family History of colon cancer/colon polyps: no  History of abnormal mammogram: no      The following portions of the patient's history were reviewed and updated as appropriate: allergies, current medications, past family history, past medical history, past social history, past surgical history and problem list.    Review of Systems    Pertinent items are noted in HPI.     Objective   Physical Exam    /98   Ht 157.5 cm (62\")   Wt 95.3 kg (210 lb)   LMP 11/08/2019   Breastfeeding? No   BMI 38.41 kg/m²    BP Readings from Last 3 Encounters:   11/11/19 150/98   05/08/19 124/80   04/30/19 122/74      Wt Readings from Last 3 Encounters:   11/11/19 95.3 kg (210 lb)   05/08/19 94.8 kg (209 lb)   04/30/19 94.8 kg (209 lb)      BMI: Estimated body mass index is 38.41 kg/m² as calculated from the following:    Height as of this encounter: 157.5 cm (62\").    Weight as of this encounter: 95.3 kg (210 lb).      General:   alert, appears stated age and cooperative   Abdomen: soft, non-tender, without masses or organomegaly   Breast: inspection negative, no nipple discharge or bleeding, no masses or nodularity palpable   Vulva: normal   Vagina: normal mucosa   Cervix: no cervical motion tenderness and no lesions   Uterus: normal size, mobile or non-tender   Adnexa: no mass, fullness, tenderness     Assessment:    1. Normal annual exam   Assessment     ICD-10-CM ICD-9-CM   1. Encounter for " gynecological examination (general) (routine) without abnormal findings Z01.419 V72.31   2. Pap smear for cervical cancer screening Z12.4 V76.2   3. Screening for HPV (human papillomavirus) Z11.51 V73.81   4. Menorrhagia with regular cycle N92.0 626.2     Plan:    Plan     [x]  Mammogram request made  [x]  PAP done  []  Labs:   []  GC/Chl/TV  []  DEXA scan   []  Referral for colonoscopy:       Kenyetta was seen today for gynecologic exam.    Diagnoses and all orders for this visit:    Encounter for gynecological examination (general) (routine) without abnormal findings    Pap smear for cervical cancer screening  -     PapIG, HPV, Rfx 16 / 18    Screening for HPV (human papillomavirus)  -     PapIG, HPV, Rfx 16 / 18    Menorrhagia with regular cycle  -     Case Request; Standing  -     Case Request    Other orders  -     Follow Anesthesia Guidelines / Standing Orders; Future  -     Chlorhexidine Skin Prep; Future  -     Follow Anesthesia Guidelines / Standing Orders; Standing  -     Obtain Informed Consent; Standing  -     Verify / Perform Chlorhexidine Skin Prep; Standing            Counseling:  --Nutrition: Stressed importance of moderation and caloric balance, stressed fresh fruit and vegetables  --Exercise: Stressed the importance of regular exercise. 3-5 times weekly   - Discussed screening mammogram recommendations.   --Discussed benefits of screening colonoscopy- age 45 unless FH  --Discussed pap smear screening recommendations

## 2019-11-12 PROBLEM — N92.0 MENORRHAGIA WITH REGULAR CYCLE: Status: ACTIVE | Noted: 2019-11-12

## 2019-11-14 LAB
CYTOLOGIST CVX/VAG CYTO: NORMAL
CYTOLOGY CVX/VAG DOC CYTO: NORMAL
CYTOLOGY CVX/VAG DOC THIN PREP: NORMAL
DX ICD CODE: NORMAL
HIV 1 & 2 AB SER-IMP: NORMAL
HPV I/H RISK 1 DNA CVX QL PROBE+SIG AMP: NEGATIVE
Lab: NORMAL
OTHER STN SPEC: NORMAL
STAT OF ADQ CVX/VAG CYTO-IMP: NORMAL

## 2019-11-19 RX ORDER — FLUOXETINE 10 MG/1
10 CAPSULE ORAL DAILY
Qty: 90 CAPSULE | Refills: 0 | Status: SHIPPED | OUTPATIENT
Start: 2019-11-19 | End: 2020-03-05

## 2019-12-07 DIAGNOSIS — J45.909 UNCOMPLICATED ASTHMA, UNSPECIFIED ASTHMA SEVERITY, UNSPECIFIED WHETHER PERSISTENT: ICD-10-CM

## 2019-12-09 RX ORDER — MONTELUKAST SODIUM 10 MG/1
10 TABLET ORAL NIGHTLY
Qty: 90 TABLET | Refills: 2 | Status: SHIPPED | OUTPATIENT
Start: 2019-12-09 | End: 2020-09-15

## 2020-01-27 ENCOUNTER — APPOINTMENT (OUTPATIENT)
Dept: PREADMISSION TESTING | Facility: HOSPITAL | Age: 45
End: 2020-01-27

## 2020-01-27 VITALS
TEMPERATURE: 98.2 F | HEART RATE: 82 BPM | BODY MASS INDEX: 39.45 KG/M2 | HEIGHT: 62 IN | OXYGEN SATURATION: 97 % | DIASTOLIC BLOOD PRESSURE: 94 MMHG | WEIGHT: 214.38 LBS | RESPIRATION RATE: 16 BRPM | SYSTOLIC BLOOD PRESSURE: 143 MMHG

## 2020-01-27 LAB
DEPRECATED RDW RBC AUTO: 43.2 FL (ref 37–54)
ERYTHROCYTE [DISTWIDTH] IN BLOOD BY AUTOMATED COUNT: 13.5 % (ref 12.3–15.4)
HCT VFR BLD AUTO: 37.8 % (ref 34–46.6)
HGB BLD-MCNC: 12.5 G/DL (ref 12–15.9)
MCH RBC QN AUTO: 29.2 PG (ref 26.6–33)
MCHC RBC AUTO-ENTMCNC: 33.1 G/DL (ref 31.5–35.7)
MCV RBC AUTO: 88.3 FL (ref 79–97)
PLATELET # BLD AUTO: 307 10*3/MM3 (ref 140–450)
PMV BLD AUTO: 9.6 FL (ref 6–12)
RBC # BLD AUTO: 4.28 10*6/MM3 (ref 3.77–5.28)
WBC NRBC COR # BLD: 8.79 10*3/MM3 (ref 3.4–10.8)

## 2020-01-27 PROCEDURE — 85027 COMPLETE CBC AUTOMATED: CPT | Performed by: OBSTETRICS & GYNECOLOGY

## 2020-01-27 PROCEDURE — 36415 COLL VENOUS BLD VENIPUNCTURE: CPT

## 2020-01-27 RX ORDER — CHOLECALCIFEROL (VITAMIN D3) 125 MCG
5000 CAPSULE ORAL EVERY MORNING
COMMUNITY
End: 2022-03-11

## 2020-01-27 NOTE — DISCHARGE INSTRUCTIONS
Take the following medications the morning of surgery:    MAY HAVE VALIUM IF NEEDED ALSO  INHALERS IF NEEDED    General Instructions:  • Do not eat solid food after midnight the night before surgery.  • You may drink clear liquids day of surgery but must stop at least one hour before your hospital arrival time.  • It is beneficial for you to have a clear drink that contains carbohydrates the day of surgery.  We suggest a 12 to 20 ounce bottle of Gatorade or Powerade for non-diabetic patients     Clear liquids are liquids you can see through.  Nothing red in color.     Plain water                               Sports drinks  Sodas                                   Gelatin (Jell-O)  Fruit juices without pulp such as white grape juice and apple juice  Popsicles that contain no fruit or yogurt  Tea or coffee (no cream or milk added)  Gatorade / Powerade  G2 / Powerade Zero      • Patients who avoid smoking, chewing tobacco and alcohol for 4 weeks prior to surgery have a reduced risk of post-operative complications.   • Do not  drink alcohol the day of surgery.   • Bring any papers given to you in the doctor’s office.  • Wear clean comfortable clothes.  • Do not wear contact lenses, false eyelashes or make-up.  Bring a case for your glasses.   • Remove all piercings.  Leave jewelry and any other valuables at home.  • Hair extensions with metal clips must be removed prior to surgery.  • The Pre-Admission Testing nurse will instruct you to bring medications if unable to obtain an accurate list in Pre-Admission Testing.  • REPORT TO MAIN SURGERY ON [id number] AT 0530 AM          Preventing a Surgical Site Infection:  • For 2 to 3 days before surgery, avoid shaving with a razor because the razor can irritate skin and make it easier to develop an infection.    • Any areas of open skin can increase the risk of a post-operative wound infection by allowing bacteria to enter and travel throughout the body.  Notify your surgeon if  you have any skin wounds / rashes even if it is not near the expected surgical site.  The area will need assessed to determine if surgery should be delayed until it is healed.  • The night prior to surgery sleep in a clean bed with clean clothing.  Do not allow pets to sleep with you.  • Shower on the morning of surgery using a fresh bar of anti-bacterial soap (such as Dial) and clean washcloth.  Dry with a clean towel and dress in clean clothing.  • Ask your surgeon if you will be receiving antibiotics prior to surgery.  • Make sure you, your family, and all healthcare providers clean their hands with soap and water or an alcohol based hand  before caring for you or your wound.    Day of surgery:  Your arrival time is approximately two hours before your scheduled surgery time.  Upon arrival, a Pre-op nurse and Anesthesiologist will review your health history, obtain vital signs, and answer questions you may have.  The only belongings needed at this time will be a list of your home medications and if applicable your C-PAP/BI-PAP machine.  If you are staying overnight your family can leave the rest of your belongings in the car and bring them to your room later.  A Pre-op nurse will start an IV and you may receive medication in preparation for surgery, including something to help you relax.  Your family will be able to see you in the Pre-op area.  Two visitors at a time will be allowed in the Pre-op room.  While you are in surgery your family should notify the waiting room  if they leave the waiting room area and provide a contact phone number.    Please be aware that surgery does come with discomfort.  We want to make every effort to control your discomfort so please discuss any uncontrolled symptoms with your nurse.   Your doctor will most likely have prescribed pain medications.      If you are going home after surgery you will receive individualized written care instructions before being  discharged.  A responsible adult must drive you to and from the hospital on the day of your surgery and stay with you for 24 hours.    2% CHLORHEXIDINE GLUCONATE* CLOTH  Preparing or “prepping” skin before surgery can reduce the risk of infection at the surgical site. To make the process easier, [facility] has chosen disposable cloths moistened with a rinse-free, 2% Chlorhexidine Gluconate (CHG) antiseptic solution. The steps below outline the prepping process and should be carefully followed.        Use the prep cloth on the area that is circled in the diagram             Directions Night before Surgery  1) Shower using a fresh bar of anti-bacterial soap (such as Dial) and clean washcloth.  Use a clean towel to completely dry your skin.  2) Do not use any lotions, oils or creams on your skin.  3) Open the package and remove 1 cloth, wipe your skin for 30 seconds in a circular motion.  Allow to dry for 3 minutes.  4) Repeat #3 with second cloth.  5) Do not touch your eyes, ears, or mouth with the prep cloth.  6) Allow the wet prep solution to air dry.  7) Discard the prep cloth and wash your hands with soap and water.   8) Dress in clean bed clothes and sleep on fresh clean bed sheets.   9) You may experience some temporary itching after the prep.    Directions Day of Surgery  1) Repeat steps 1,2,3,4,5,6,7, and 9.   2) Dress in clean clothes before coming to the hospital.      If you have any questions please call Pre-Admission Testing at [phone].  Deductibles and co-payments are collected on the day of service. Please be prepared to pay the required co-pay, deductible or deposit on the day of service as defined by your plan.

## 2020-02-02 DIAGNOSIS — F41.9 ANXIETY: ICD-10-CM

## 2020-02-03 ENCOUNTER — ANESTHESIA (OUTPATIENT)
Dept: PERIOP | Facility: HOSPITAL | Age: 45
End: 2020-02-03

## 2020-02-03 ENCOUNTER — ANESTHESIA EVENT (OUTPATIENT)
Dept: PERIOP | Facility: HOSPITAL | Age: 45
End: 2020-02-03

## 2020-02-03 ENCOUNTER — HOSPITAL ENCOUNTER (OUTPATIENT)
Facility: HOSPITAL | Age: 45
Setting detail: HOSPITAL OUTPATIENT SURGERY
Discharge: HOME | End: 2020-02-03
Attending: OBSTETRICS & GYNECOLOGY | Admitting: OBSTETRICS & GYNECOLOGY

## 2020-02-03 VITALS
HEIGHT: 62 IN | DIASTOLIC BLOOD PRESSURE: 92 MMHG | HEART RATE: 75 BPM | RESPIRATION RATE: 16 BRPM | OXYGEN SATURATION: 97 % | WEIGHT: 214.9 LBS | TEMPERATURE: 97.9 F | SYSTOLIC BLOOD PRESSURE: 153 MMHG | BODY MASS INDEX: 39.55 KG/M2

## 2020-02-03 DIAGNOSIS — N92.0 MENORRHAGIA WITH REGULAR CYCLE: ICD-10-CM

## 2020-02-03 PROCEDURE — 25010000002 KETOROLAC TROMETHAMINE PER 15 MG: Performed by: NURSE ANESTHETIST, CERTIFIED REGISTERED

## 2020-02-03 PROCEDURE — 88342 IMHCHEM/IMCYTCHM 1ST ANTB: CPT | Performed by: OBSTETRICS & GYNECOLOGY

## 2020-02-03 PROCEDURE — 25010000002 ONDANSETRON PER 1 MG: Performed by: NURSE ANESTHETIST, CERTIFIED REGISTERED

## 2020-02-03 PROCEDURE — 81025 URINE PREGNANCY TEST: CPT | Performed by: OBSTETRICS & GYNECOLOGY

## 2020-02-03 PROCEDURE — S0260 H&P FOR SURGERY: HCPCS | Performed by: OBSTETRICS & GYNECOLOGY

## 2020-02-03 PROCEDURE — 25010000002 ROPIVACAINE PER 1 MG: Performed by: OBSTETRICS & GYNECOLOGY

## 2020-02-03 PROCEDURE — 25010000002 DEXAMETHASONE PER 1 MG: Performed by: NURSE ANESTHETIST, CERTIFIED REGISTERED

## 2020-02-03 PROCEDURE — 25010000002 MIDAZOLAM PER 1 MG: Performed by: ANESTHESIOLOGY

## 2020-02-03 PROCEDURE — 25010000002 PROPOFOL 10 MG/ML EMULSION: Performed by: NURSE ANESTHETIST, CERTIFIED REGISTERED

## 2020-02-03 PROCEDURE — 25010000002 FENTANYL CITRATE (PF) 100 MCG/2ML SOLUTION: Performed by: NURSE ANESTHETIST, CERTIFIED REGISTERED

## 2020-02-03 PROCEDURE — 88305 TISSUE EXAM BY PATHOLOGIST: CPT | Performed by: OBSTETRICS & GYNECOLOGY

## 2020-02-03 PROCEDURE — 58563 HYSTEROSCOPY ABLATION: CPT | Performed by: OBSTETRICS & GYNECOLOGY

## 2020-02-03 RX ORDER — ONDANSETRON 2 MG/ML
4 INJECTION INTRAMUSCULAR; INTRAVENOUS ONCE AS NEEDED
Status: DISCONTINUED | OUTPATIENT
Start: 2020-02-03 | End: 2020-02-03 | Stop reason: HOSPADM

## 2020-02-03 RX ORDER — SODIUM CHLORIDE 9 MG/ML
INJECTION, SOLUTION INTRAVENOUS AS NEEDED
Status: DISCONTINUED | OUTPATIENT
Start: 2020-02-03 | End: 2020-02-03 | Stop reason: HOSPADM

## 2020-02-03 RX ORDER — NALOXONE HCL 0.4 MG/ML
0.2 VIAL (ML) INJECTION AS NEEDED
Status: DISCONTINUED | OUTPATIENT
Start: 2020-02-03 | End: 2020-02-03 | Stop reason: HOSPADM

## 2020-02-03 RX ORDER — HYDROCODONE BITARTRATE AND ACETAMINOPHEN 7.5; 325 MG/1; MG/1
1 TABLET ORAL ONCE AS NEEDED
Status: DISCONTINUED | OUTPATIENT
Start: 2020-02-03 | End: 2020-02-03 | Stop reason: HOSPADM

## 2020-02-03 RX ORDER — PROMETHAZINE HYDROCHLORIDE 25 MG/1
25 TABLET ORAL ONCE AS NEEDED
Status: DISCONTINUED | OUTPATIENT
Start: 2020-02-03 | End: 2020-02-03 | Stop reason: HOSPADM

## 2020-02-03 RX ORDER — LIDOCAINE HYDROCHLORIDE 20 MG/ML
INJECTION, SOLUTION INFILTRATION; PERINEURAL AS NEEDED
Status: DISCONTINUED | OUTPATIENT
Start: 2020-02-03 | End: 2020-02-03 | Stop reason: SURG

## 2020-02-03 RX ORDER — DIPHENHYDRAMINE HYDROCHLORIDE 50 MG/ML
12.5 INJECTION INTRAMUSCULAR; INTRAVENOUS
Status: DISCONTINUED | OUTPATIENT
Start: 2020-02-03 | End: 2020-02-03 | Stop reason: HOSPADM

## 2020-02-03 RX ORDER — DEXAMETHASONE SODIUM PHOSPHATE 10 MG/ML
INJECTION INTRAMUSCULAR; INTRAVENOUS AS NEEDED
Status: DISCONTINUED | OUTPATIENT
Start: 2020-02-03 | End: 2020-02-03 | Stop reason: SURG

## 2020-02-03 RX ORDER — KETOROLAC TROMETHAMINE 30 MG/ML
INJECTION, SOLUTION INTRAMUSCULAR; INTRAVENOUS AS NEEDED
Status: DISCONTINUED | OUTPATIENT
Start: 2020-02-03 | End: 2020-02-03 | Stop reason: SURG

## 2020-02-03 RX ORDER — FLUMAZENIL 0.1 MG/ML
0.2 INJECTION INTRAVENOUS AS NEEDED
Status: DISCONTINUED | OUTPATIENT
Start: 2020-02-03 | End: 2020-02-03 | Stop reason: HOSPADM

## 2020-02-03 RX ORDER — MAGNESIUM HYDROXIDE 1200 MG/15ML
LIQUID ORAL AS NEEDED
Status: DISCONTINUED | OUTPATIENT
Start: 2020-02-03 | End: 2020-02-03 | Stop reason: HOSPADM

## 2020-02-03 RX ORDER — EPHEDRINE SULFATE 50 MG/ML
5 INJECTION, SOLUTION INTRAVENOUS ONCE AS NEEDED
Status: DISCONTINUED | OUTPATIENT
Start: 2020-02-03 | End: 2020-02-03 | Stop reason: HOSPADM

## 2020-02-03 RX ORDER — SODIUM CHLORIDE 0.9 % (FLUSH) 0.9 %
10 SYRINGE (ML) INJECTION AS NEEDED
Status: DISCONTINUED | OUTPATIENT
Start: 2020-02-03 | End: 2020-02-03 | Stop reason: HOSPADM

## 2020-02-03 RX ORDER — OXYCODONE AND ACETAMINOPHEN 7.5; 325 MG/1; MG/1
1 TABLET ORAL ONCE AS NEEDED
Status: COMPLETED | OUTPATIENT
Start: 2020-02-03 | End: 2020-02-03

## 2020-02-03 RX ORDER — PROMETHAZINE HYDROCHLORIDE 25 MG/ML
12.5 INJECTION, SOLUTION INTRAMUSCULAR; INTRAVENOUS ONCE AS NEEDED
Status: DISCONTINUED | OUTPATIENT
Start: 2020-02-03 | End: 2020-02-03 | Stop reason: HOSPADM

## 2020-02-03 RX ORDER — PROMETHAZINE HYDROCHLORIDE 25 MG/1
25 SUPPOSITORY RECTAL ONCE AS NEEDED
Status: DISCONTINUED | OUTPATIENT
Start: 2020-02-03 | End: 2020-02-03 | Stop reason: HOSPADM

## 2020-02-03 RX ORDER — PROMETHAZINE HYDROCHLORIDE 25 MG/ML
6.25 INJECTION, SOLUTION INTRAMUSCULAR; INTRAVENOUS
Status: DISCONTINUED | OUTPATIENT
Start: 2020-02-03 | End: 2020-02-03 | Stop reason: HOSPADM

## 2020-02-03 RX ORDER — DIAZEPAM 5 MG/1
TABLET ORAL
Qty: 20 TABLET | Refills: 0 | Status: SHIPPED | OUTPATIENT
Start: 2020-02-03 | End: 2020-11-17 | Stop reason: SDUPTHER

## 2020-02-03 RX ORDER — MIDAZOLAM HYDROCHLORIDE 1 MG/ML
1 INJECTION INTRAMUSCULAR; INTRAVENOUS
Status: DISCONTINUED | OUTPATIENT
Start: 2020-02-03 | End: 2020-02-03 | Stop reason: HOSPADM

## 2020-02-03 RX ORDER — ROPIVACAINE HYDROCHLORIDE 5 MG/ML
INJECTION, SOLUTION EPIDURAL; INFILTRATION; PERINEURAL AS NEEDED
Status: DISCONTINUED | OUTPATIENT
Start: 2020-02-03 | End: 2020-02-03 | Stop reason: HOSPADM

## 2020-02-03 RX ORDER — DIPHENHYDRAMINE HCL 25 MG
25 CAPSULE ORAL
Status: DISCONTINUED | OUTPATIENT
Start: 2020-02-03 | End: 2020-02-03 | Stop reason: HOSPADM

## 2020-02-03 RX ORDER — OXYCODONE HYDROCHLORIDE AND ACETAMINOPHEN 5; 325 MG/1; MG/1
1-2 TABLET ORAL EVERY 6 HOURS PRN
Qty: 20 TABLET | Refills: 0 | Status: SHIPPED | OUTPATIENT
Start: 2020-02-03 | End: 2020-11-17

## 2020-02-03 RX ORDER — FENTANYL CITRATE 50 UG/ML
INJECTION, SOLUTION INTRAMUSCULAR; INTRAVENOUS AS NEEDED
Status: DISCONTINUED | OUTPATIENT
Start: 2020-02-03 | End: 2020-02-03 | Stop reason: SURG

## 2020-02-03 RX ORDER — FENTANYL CITRATE 50 UG/ML
50 INJECTION, SOLUTION INTRAMUSCULAR; INTRAVENOUS
Status: DISCONTINUED | OUTPATIENT
Start: 2020-02-03 | End: 2020-02-03 | Stop reason: HOSPADM

## 2020-02-03 RX ORDER — SODIUM CHLORIDE, SODIUM LACTATE, POTASSIUM CHLORIDE, CALCIUM CHLORIDE 600; 310; 30; 20 MG/100ML; MG/100ML; MG/100ML; MG/100ML
9 INJECTION, SOLUTION INTRAVENOUS CONTINUOUS PRN
Status: DISCONTINUED | OUTPATIENT
Start: 2020-02-03 | End: 2020-02-03 | Stop reason: HOSPADM

## 2020-02-03 RX ORDER — ONDANSETRON 2 MG/ML
INJECTION INTRAMUSCULAR; INTRAVENOUS AS NEEDED
Status: DISCONTINUED | OUTPATIENT
Start: 2020-02-03 | End: 2020-02-03 | Stop reason: SURG

## 2020-02-03 RX ORDER — SODIUM CHLORIDE 0.9 % (FLUSH) 0.9 %
10 SYRINGE (ML) INJECTION EVERY 12 HOURS SCHEDULED
Status: DISCONTINUED | OUTPATIENT
Start: 2020-02-03 | End: 2020-02-03 | Stop reason: HOSPADM

## 2020-02-03 RX ORDER — MIDAZOLAM HYDROCHLORIDE 1 MG/ML
2 INJECTION INTRAMUSCULAR; INTRAVENOUS
Status: DISCONTINUED | OUTPATIENT
Start: 2020-02-03 | End: 2020-02-03 | Stop reason: HOSPADM

## 2020-02-03 RX ORDER — IBUPROFEN 600 MG/1
600 TABLET ORAL EVERY 6 HOURS PRN
Qty: 30 TABLET | Refills: 1 | Status: SHIPPED | OUTPATIENT
Start: 2020-02-03 | End: 2020-03-05

## 2020-02-03 RX ORDER — HYDROMORPHONE HYDROCHLORIDE 1 MG/ML
0.5 INJECTION, SOLUTION INTRAMUSCULAR; INTRAVENOUS; SUBCUTANEOUS
Status: DISCONTINUED | OUTPATIENT
Start: 2020-02-03 | End: 2020-02-03 | Stop reason: HOSPADM

## 2020-02-03 RX ORDER — OXYCODONE HYDROCHLORIDE AND ACETAMINOPHEN 5; 325 MG/1; MG/1
1 TABLET ORAL ONCE AS NEEDED
Status: DISCONTINUED | OUTPATIENT
Start: 2020-02-03 | End: 2020-02-03 | Stop reason: HOSPADM

## 2020-02-03 RX ORDER — PROPOFOL 10 MG/ML
VIAL (ML) INTRAVENOUS AS NEEDED
Status: DISCONTINUED | OUTPATIENT
Start: 2020-02-03 | End: 2020-02-03 | Stop reason: SURG

## 2020-02-03 RX ORDER — LABETALOL HYDROCHLORIDE 5 MG/ML
5 INJECTION, SOLUTION INTRAVENOUS
Status: DISCONTINUED | OUTPATIENT
Start: 2020-02-03 | End: 2020-02-03 | Stop reason: HOSPADM

## 2020-02-03 RX ORDER — ACETAMINOPHEN 325 MG/1
650 TABLET ORAL ONCE AS NEEDED
Status: DISCONTINUED | OUTPATIENT
Start: 2020-02-03 | End: 2020-02-03 | Stop reason: HOSPADM

## 2020-02-03 RX ORDER — FAMOTIDINE 10 MG/ML
20 INJECTION, SOLUTION INTRAVENOUS
Status: COMPLETED | OUTPATIENT
Start: 2020-02-03 | End: 2020-02-03

## 2020-02-03 RX ORDER — FAMOTIDINE 20 MG/1
20 TABLET, FILM COATED ORAL
Status: COMPLETED | OUTPATIENT
Start: 2020-02-03 | End: 2020-02-03

## 2020-02-03 RX ORDER — HYDRALAZINE HYDROCHLORIDE 20 MG/ML
5 INJECTION INTRAMUSCULAR; INTRAVENOUS
Status: DISCONTINUED | OUTPATIENT
Start: 2020-02-03 | End: 2020-02-03 | Stop reason: HOSPADM

## 2020-02-03 RX ADMIN — FAMOTIDINE 20 MG: 10 INJECTION INTRAVENOUS at 07:21

## 2020-02-03 RX ADMIN — KETOROLAC TROMETHAMINE 30 MG: 30 INJECTION, SOLUTION INTRAMUSCULAR; INTRAVENOUS at 07:59

## 2020-02-03 RX ADMIN — DEXAMETHASONE SODIUM PHOSPHATE 8 MG: 10 INJECTION INTRAMUSCULAR; INTRAVENOUS at 07:46

## 2020-02-03 RX ADMIN — PROPOFOL 200 MG: 10 INJECTION, EMULSION INTRAVENOUS at 07:38

## 2020-02-03 RX ADMIN — LIDOCAINE HYDROCHLORIDE 60 MG: 20 INJECTION, SOLUTION INFILTRATION; PERINEURAL at 07:38

## 2020-02-03 RX ADMIN — MIDAZOLAM 1 MG: 1 INJECTION INTRAMUSCULAR; INTRAVENOUS at 07:25

## 2020-02-03 RX ADMIN — SODIUM CHLORIDE, POTASSIUM CHLORIDE, SODIUM LACTATE AND CALCIUM CHLORIDE 9 ML/HR: 600; 310; 30; 20 INJECTION, SOLUTION INTRAVENOUS at 06:38

## 2020-02-03 RX ADMIN — OXYCODONE HYDROCHLORIDE AND ACETAMINOPHEN 1 TABLET: 7.5; 325 TABLET ORAL at 08:42

## 2020-02-03 RX ADMIN — FENTANYL CITRATE 100 MCG: 50 INJECTION INTRAMUSCULAR; INTRAVENOUS at 07:38

## 2020-02-03 RX ADMIN — ONDANSETRON HYDROCHLORIDE 4 MG: 2 SOLUTION INTRAMUSCULAR; INTRAVENOUS at 07:50

## 2020-02-03 NOTE — H&P
Patient Care Team:  Jorge Luis White MD as PCP - General  Jorge Luis White MD Allison, Wes A, MD as Consulting Physician (Otolaryngology)    Chief complaint menorrhagia    Subjective     Patient is a 44 y.o. female presents with menorrhagia. She has small fibroids.  She has tried ocps and kyleena and side effects of both.  Pt would like an ablation.  US shows thin lining and pt could not tolerate office biopsy.  Pt has recently had a kyleena IUD in place.      Review of Systems   Pertinent items are noted in HPI, all other systems reviewed and negative    History  Past Medical History:   Diagnosis Date   • Allergic     many seasonal/indoor   • Allergic rhinitis    • Anesthesia     VERY AGITATED WHEN WAKING UP, ALSO WOKE UP DURING REMOVAL OF WISDOM TEETH   • Anxiety    • Asthma 2002?   • Atypical squamous cells of undetermined significance (ASCUS) on Papanicolaou smear of cervix    • Depression 2015/2016    very sad after my mom was diagnosed with brain cancer   • Encounter for IUD removal    • GERD (gastroesophageal reflux disease)     sometimes   • H/O dysmenorrhea    • H/O infertility    • H/O seasonal allergies    • Ovarian cyst    • Pelvic pain in female    • PMS (premenstrual syndrome)    • Scoliosis     very mild     Past Surgical History:   Procedure Laterality Date   • SEPTOPLASTY, RESECTION INFERIOR TURBINATES  02/2016   • WISDOM TOOTH EXTRACTION  1990     Family History   Problem Relation Age of Onset   • Hypertension Mother    • Breast cancer Mother    • Thyroid disease Mother    • Cancer Mother         breast, thyroid, brain   • Hyperlipidemia Mother    • Prostate cancer Father    • Cancer Father         prostate, skin   • Breast cancer Paternal Aunt    • Diabetes Maternal Grandmother    • Hyperlipidemia Maternal Grandmother    • Hypertension Maternal Grandmother    • Colon cancer Other    • Arthritis Paternal Grandmother    • Stroke Paternal Grandmother         mini stroke   • Cancer Paternal  Aunt         breast   • Depression Maternal Aunt         bipolar   • Heart disease Paternal Grandfather    • Hyperlipidemia Paternal Grandfather    • Hypertension Paternal Grandfather    • Mental illness Maternal Aunt         bipolar   • Depression Maternal Aunt         bipolar   • No Known Problems Sister    • No Known Problems Brother    • BRCA 1/2 Neg Hx    • Endometrial cancer Neg Hx    • Ovarian cancer Neg Hx    • Malig Hyperthermia Neg Hx      Social History     Tobacco Use   • Smoking status: Never Smoker   • Smokeless tobacco: Never Used   Substance Use Topics   • Alcohol use: Yes     Comment: 7-10/week   • Drug use: No     Medications Prior to Admission   Medication Sig Dispense Refill Last Dose   • azelastine (ASTELIN) 0.1 % nasal spray 2 sprays into the nostril(s) as directed by provider Every Morning. Use in each nostril as directed    2/3/2020 at 0400   • cetirizine (zyrTEC) 10 MG tablet Take 10 mg by mouth Daily.   2/2/2020 at 1000   • Cholecalciferol (VITAMIN D) 125 MCG (5000 UT) capsule capsule Take 5,000 Units by mouth Every Morning.   2/2/2020 at 1000   • diazePAM (VALIUM) 5 MG tablet Take 1 tablet by mouth Every 8 (Eight) Hours As Needed for Anxiety. 20 tablet 0 2/3/2020 at 0400   • FLUoxetine (PROzac) 10 MG capsule TAKE 1 CAPSULE BY MOUTH DAILY (Patient taking differently: Take 10 mg by mouth Every Morning.) 90 capsule 0 2/2/2020 at 1000   • Methylsulfonylmethane (MSM) 1500 MG tablet Take 1 tablet by mouth Every Morning.   2/2/2020 at 1000   • montelukast (SINGULAIR) 10 MG tablet TAKE 1 TABLET BY MOUTH  EVERY NIGHT (Patient taking differently: Take 10 mg by mouth Every Morning.) 90 tablet 2 2/2/2020 at 1000   • Probiotic Product (PROBIOTIC DAILY PO) Take 2 capsules by mouth Every Morning. PRO-50 VITAMIN BOUNTY   2/2/2020 at 1000   • Triamcinolone Acetonide (NASACORT) 55 MCG/ACT nasal inhaler 1-2 sprays into the nostril(s) as directed by provider Every Morning.   2/3/2020 at 0400   • budesonide  (PULMICORT) 90 MCG/ACT inhaler Inhale 1 puff As Needed.   More than a month at Unknown time   • FLUCELVAX QUADRIVALENT 0.5 ML suspension prefilled syringe injection    Unknown at Unknown time   • NON FORMULARY Take 25 mg by mouth As Needed. BLACKBERRY GUMMIES CBD EXTRACT   More than a month at Unknown time     Allergies:  Ceclor [cefaclor]    Objective     Vital Signs  Temp:  [98.3 °F (36.8 °C)] 98.3 °F (36.8 °C)  Heart Rate:  [72-77] 72  Resp:  [18] 18  BP: (155)/(93) 155/93    Physical Exam:    General Appearance: alert, appears stated age and cooperative  Neck: no adenopathy, supple, trachea midline and no thyromegaly  Lungs: respirations regular, respirations even and respirations unlabored  Heart: regular rhythm & normal rate  Abdomen: normal bowel sounds, no masses, soft non-tender, no guarding and no rebound tenderness  Pelvic: cervix normal in appearance, external genitalia normal, no adnexal masses or tenderness, uterus normal in size, shape, and consistency and vagina normal without dischartge  Extremities: moves extremities well, no edema, no cyanosis and no redness  Skin: no bleeding, bruising or rash  Neurologic: Mental Status orientated to person, place, time and situation, Speech normal content and execusion  Psych: normal    Results Review:    I reviewed the patient's new clinical results.    Assessment/Plan       Menorrhagia with regular cycle      Dilation and curettage, hysteroscopy and novasure endometrial ablation    I discussed the patients findings and my recommendations with patient and family.     Onelia Tuttle MD  02/03/20  7:28 AM    Time: 15 min

## 2020-02-03 NOTE — ANESTHESIA POSTPROCEDURE EVALUATION
"Patient: Kenyetta Montemayor    Procedure Summary     Date:  02/03/20 Room / Location:  [facility] OR [address] [place] [address] MAIN OR    Anesthesia Start:  0734 Anesthesia Stop:  0821    Procedure:  DILATATION AND CURETTAGE HYSTEROSCOPY NOVASURE ENDOMETRIAL ABLATION (N/A Vagina) Diagnosis:       Menorrhagia with regular cycle      (Menorrhagia with regular cycle [N92.0])    Surgeon:  Onelia Tuttle MD Provider:  Kirk Cano MD    Anesthesia Type:  general ASA Status:  2          Anesthesia Type: general    Vitals  Vitals Value Taken Time   /99 2/3/2020  8:45 AM   Temp 36.6 °C (97.9 °F) 2/3/2020  8:50 AM   Pulse 72 2/3/2020  8:48 AM   Resp 16 2/3/2020  8:45 AM   SpO2 96 % 2/3/2020  8:48 AM   Vitals shown include unvalidated device data.        Post Anesthesia Care and Evaluation    Patient location during evaluation: bedside  Patient participation: complete - patient participated  Level of consciousness: sleepy but conscious  Pain score: 0  Pain management: adequate  Airway patency: patent  Anesthetic complications: No anesthetic complications    Cardiovascular status: acceptable  Respiratory status: acceptable  Hydration status: acceptable    Comments: /92   Pulse 75   Temp 36.6 °C (97.9 °F) (Oral)   Resp 16   Ht 157.5 cm (62\")   Wt 97.5 kg (214 lb 14.4 oz)   LMP 01/26/2020   SpO2 97%   BMI 39.31 kg/m²         "

## 2020-02-03 NOTE — ANESTHESIA PROCEDURE NOTES
Airway  Urgency: elective    Date/Time: 2/3/2020 7:40 AM    General Information and Staff    Patient location during procedure: OR  Anesthesiologist: Kirk Cano MD  CRNA: Lupe Lees CRNA    Indications and Patient Condition    Preoxygenated: yes  Mask difficulty assessment: 0 - not attempted    Final Airway Details  Final airway type: supraglottic airway      Successful airway: unique  Size 4    Number of attempts at approach: 1  Assessment: lips, teeth, and gum same as pre-op and atraumatic intubation    Additional Comments  Atraumatic, adeq seal, secured, MV/AV until SV

## 2020-02-03 NOTE — OP NOTE
DILATATION AND CURETTAGE HYSTEROSCOPY NOVASURE ENDOMETRIAL ABLATION  Procedure Report    Patient Name:  Kenyetta Montemayor  YOB: 1975    Date of Surgery:  2/3/2020     Indications:  menorrhagia     Pre-op Diagnosis:   Menorrhagia with regular cycle [N92.0]       Post-Op Diagnosis Codes:     * Menorrhagia with regular cycle [N92.0]    Procedure/CPT® Codes:      Procedure(s):  DILATATION AND CURETTAGE HYSTEROSCOPY NOVASURE ENDOMETRIAL ABLATION    Staff:  Surgeon(s):  Onelia Tuttle MD         Anesthesia: General    Estimated Blood Loss: minimal    Implants:    Nothing was implanted during the procedure    Specimen:          Specimens     ID Source Type Tests Collected By Collected At Frozen?      A Uterus Tissue · TISSUE PATHOLOGY EXAM   Onelia Tuttle MD 2/3/20 0800      Description: ENDOMETRIAL CURETTINGS              Findings: normal cavity, 9 cm sound, cavity length is 5.5, width 4.5, small amount of currettings     Complications: none    Description of Procedure:       DESCRIPTION OF PROCEDURE: After informed consent was obtained, the patient was taken to the operating room where general anesthesia was induced. Her legs were placed in candy cane stirrups. She was prepped and draped in the usual sterile fashion. A weighted speculum was placed in the vagina. The cervix was grasped with a single-tooth tenaculum. It was dilated to admit a hysteroscope. The hysteroscope was introduced with the findings noted above. The hysteroscope was removed. A curettage was done, noting a small amount of tissue. This will be sent to Pathology. The NovaSure device was then placed and adjusted to a cavity length of 5.5 and a cavity width of 4.5 cm. Cavity assessment was passed. The NovaSure was then completed in 1 minute 3 seconds. The NovaSure device was removed. A cervical block was done with 0.5% plain Naropin. The hysteroscope was reintroduced, noting a good uniform burn along the endometrium. The hysteroscope  was removed. Hysteroscopic fluid deficit was essentially zero. The cervix was hemostatic. All instruments were removed. All counts were correct. The patient was awoken in the operating room and taken to the recovery room in stable condition.               Onelia Tuttle MD     Date: 2/3/2020  Time: 8:04 AM

## 2020-02-03 NOTE — ANESTHESIA PREPROCEDURE EVALUATION
Anesthesia Evaluation     Patient summary reviewed   history of anesthetic complications (hx excitability postop):               Airway   Mallampati: II  No difficulty expected  Dental      Pulmonary    (+) asthma,  Cardiovascular     Rhythm: regular        Neuro/Psych  GI/Hepatic/Renal/Endo    (+) obesity,       Musculoskeletal     Abdominal    Substance History      OB/GYN          Other                      Anesthesia Plan    ASA 2     general       Anesthetic plan, all risks, benefits, and alternatives have been provided, discussed and informed consent has been obtained with: patient.  Use of blood products discussed with patient .

## 2020-02-04 LAB
CYTO UR: NORMAL
LAB AP CASE REPORT: NORMAL
PATH REPORT.FINAL DX SPEC: NORMAL
PATH REPORT.GROSS SPEC: NORMAL

## 2020-02-17 ENCOUNTER — OFFICE VISIT (OUTPATIENT)
Dept: OBSTETRICS AND GYNECOLOGY | Age: 45
End: 2020-02-17

## 2020-02-17 VITALS
SYSTOLIC BLOOD PRESSURE: 138 MMHG | HEIGHT: 62 IN | BODY MASS INDEX: 39.2 KG/M2 | WEIGHT: 213 LBS | DIASTOLIC BLOOD PRESSURE: 82 MMHG

## 2020-02-17 DIAGNOSIS — Z98.890 POSTOPERATIVE STATE: ICD-10-CM

## 2020-02-17 DIAGNOSIS — N92.0 MENORRHAGIA WITH REGULAR CYCLE: Primary | ICD-10-CM

## 2020-02-17 PROCEDURE — 99212 OFFICE O/P EST SF 10 MIN: CPT | Performed by: OBSTETRICS & GYNECOLOGY

## 2020-02-17 NOTE — PROGRESS NOTES
Answers for HPI/ROS submitted by the patient on 2020   What is the primary reason for your visit?: Other  Please describe your symptoms.: Post Op Follow Up  Have you had these symptoms before?: No  GYN Visit    2020    Patient: Kenyetta Montemayor          MR#:[id number]      Chief Complaint   Patient presents with   • Post-op     D&C Ablation on   2020.  Doing well.        History of Present Illness    44 y.o. female  who presents for  Post op appt  Path was benign, doing well, expecting a menses in 1 week  Will go on trip to [place] this         Patient's last menstrual period was 2020.    ________________________________________  Patient Active Problem List   Diagnosis   • Family history of breast cancer in mother   • Asthma   • H/O dysmenorrhea   • Anxiety   • Menorrhagia with regular cycle       Past Medical History:   Diagnosis Date   • Allergic     many seasonal/indoor   • Allergic rhinitis    • Anesthesia     VERY AGITATED WHEN WAKING UP, ALSO WOKE UP DURING REMOVAL OF WISDOM TEETH   • Anxiety    • Asthma ?   • Atypical squamous cells of undetermined significance (ASCUS) on Papanicolaou smear of cervix    • Depression     very sad after my mom was diagnosed with brain cancer   • Encounter for IUD removal    • GERD (gastroesophageal reflux disease)     sometimes   • H/O dysmenorrhea    • H/O infertility    • H/O seasonal allergies    • Ovarian cyst    • Pelvic pain in female    • PMS (premenstrual syndrome)    • Scoliosis     very mild       Past Surgical History:   Procedure Laterality Date   • D&C HYSTEROSCOPY ENDOMETRIAL ABLATION N/A 2/3/2020    Procedure: DILATATION AND CURETTAGE HYSTEROSCOPY NOVASURE ENDOMETRIAL ABLATION;  Surgeon: Onelia Tuttle MD;  Location: [facility];  Service: Obstetrics/Gynecology;  Laterality: N/A;   • SEPTOPLASTY, RESECTION INFERIOR TURBINATES  2016   • WISDOM TOOTH EXTRACTION         Social History     Tobacco Use  "  Smoking Status Never Smoker   Smokeless Tobacco Never Used       has a current medication list which includes the following prescription(s): azelastine, budesonide, cetirizine, vitamin d, diazepam, flucelvax quadrivalent, fluoxetine, ibuprofen, msm, montelukast, NON FORMULARY, oxycodone-acetaminophen, and triamcinolone acetonide.  ________________________________________    Current contraception: none      The following portions of the patient's history were reviewed and updated as appropriate: allergies, current medications, past family history, past medical history, past social history, past surgical history and problem list.    Review of Systems    Pertinent items are noted in HPI.     Objective   Physical Exam    /82   Ht 157.5 cm (62\")   Wt 96.6 kg (213 lb)   LMP 01/26/2020   Breastfeeding No   BMI 38.96 kg/m²    BP Readings from Last 3 Encounters:   02/17/20 138/82   02/03/20 153/92   01/27/20 143/94      Wt Readings from Last 3 Encounters:   02/17/20 96.6 kg (213 lb)   02/03/20 97.5 kg (214 lb 14.4 oz)   01/27/20 97.2 kg (214 lb 6 oz)      BMI: Estimated body mass index is 38.96 kg/m² as calculated from the following:    Height as of this encounter: 157.5 cm (62\").    Weight as of this encounter: 96.6 kg (213 lb).    Lungs: non labored breathing, no wheezing or tachpnea  Extremities: extremities normal, atraumatic, no cyanosis or edema  Skin: Skin color, texture, turgor normal. No rashes or lesions  Neurologic: Grossly normal  General:   alert and appears stated age   Abdomen: soft, non-tender, without masses or organomegaly                             Assessment:      Kenyetta was seen today for post-op.    Diagnoses and all orders for this visit:    Menorrhagia with regular cycle    Postoperative state      Follow up AE in November        "

## 2020-03-05 RX ORDER — FLUOXETINE 10 MG/1
10 CAPSULE ORAL DAILY
Qty: 90 CAPSULE | Refills: 0 | Status: SHIPPED | OUTPATIENT
Start: 2020-03-05 | End: 2020-06-08

## 2020-06-08 RX ORDER — FLUOXETINE 10 MG/1
10 CAPSULE ORAL DAILY
Qty: 90 CAPSULE | Refills: 0 | Status: SHIPPED | OUTPATIENT
Start: 2020-06-08 | End: 2020-10-29

## 2020-06-08 RX ORDER — FLUOXETINE 10 MG/1
10 CAPSULE ORAL DAILY
Qty: 90 CAPSULE | Refills: 0 | Status: SHIPPED | OUTPATIENT
Start: 2020-06-08 | End: 2020-10-29 | Stop reason: SDUPTHER

## 2020-09-13 DIAGNOSIS — J45.909 UNCOMPLICATED ASTHMA, UNSPECIFIED ASTHMA SEVERITY, UNSPECIFIED WHETHER PERSISTENT: ICD-10-CM

## 2020-09-15 RX ORDER — MONTELUKAST SODIUM 10 MG/1
10 TABLET ORAL NIGHTLY
Qty: 90 TABLET | Refills: 3 | Status: SHIPPED | OUTPATIENT
Start: 2020-09-15 | End: 2020-11-17 | Stop reason: SDUPTHER

## 2020-10-29 RX ORDER — FLUOXETINE 10 MG/1
10 CAPSULE ORAL DAILY
Qty: 90 CAPSULE | Refills: 3 | Status: SHIPPED | OUTPATIENT
Start: 2020-10-29 | End: 2021-08-23

## 2020-11-17 ENCOUNTER — TELEMEDICINE (OUTPATIENT)
Dept: FAMILY MEDICINE CLINIC | Facility: CLINIC | Age: 45
End: 2020-11-17

## 2020-11-17 VITALS — SYSTOLIC BLOOD PRESSURE: 150 MMHG | DIASTOLIC BLOOD PRESSURE: 94 MMHG

## 2020-11-17 DIAGNOSIS — F41.9 ANXIETY: ICD-10-CM

## 2020-11-17 DIAGNOSIS — I10 ESSENTIAL HYPERTENSION: Primary | ICD-10-CM

## 2020-11-17 DIAGNOSIS — J45.909 UNCOMPLICATED ASTHMA, UNSPECIFIED ASTHMA SEVERITY, UNSPECIFIED WHETHER PERSISTENT: ICD-10-CM

## 2020-11-17 PROCEDURE — 99214 OFFICE O/P EST MOD 30 MIN: CPT | Performed by: FAMILY MEDICINE

## 2020-11-17 RX ORDER — MONTELUKAST SODIUM 10 MG/1
10 TABLET ORAL NIGHTLY
Qty: 90 TABLET | Refills: 3 | Status: SHIPPED | OUTPATIENT
Start: 2020-11-17 | End: 2021-09-22

## 2020-11-17 RX ORDER — DIAZEPAM 5 MG/1
5 TABLET ORAL EVERY 8 HOURS PRN
Qty: 20 TABLET | Refills: 1 | Status: SHIPPED | OUTPATIENT
Start: 2020-11-17 | End: 2021-07-14 | Stop reason: SDUPTHER

## 2020-11-17 RX ORDER — LISINOPRIL 20 MG/1
20 TABLET ORAL DAILY
Qty: 30 TABLET | Refills: 3 | Status: SHIPPED | OUTPATIENT
Start: 2020-11-17 | End: 2021-03-12 | Stop reason: SDUPTHER

## 2020-11-17 RX ORDER — AZELASTINE 1 MG/ML
2 SPRAY, METERED NASAL EVERY MORNING
Qty: 3 EACH | Refills: 3 | Status: SHIPPED | OUTPATIENT
Start: 2020-11-17 | End: 2022-03-22 | Stop reason: SDUPTHER

## 2020-11-17 NOTE — PROGRESS NOTES
Subjective   Kenyetta Montemayor is a 45 y.o. female.   Anxiety (med refills) and Allergies (med refills)    History of Present Illness   Kenyetta is h video visit today to request Rx refills for her asthma meds as well as her valium.  Kenyetta is also concerned because her bp has been elevated.  In anticipation of this visit she has been taking her bp and it has been 140's-150's/90's -100.  She has not been on blood pressure medication before but a quick review of her most recent visits here and at her gynecologist shows that her blood pressure is consistently elevated.  She is willing to try medication.  She notes that her mother also has high blood pressure  Kenyetta has chronic anxiety and notes that the last few weeks have been particularly difficult due to a sudden loss of her father-in-law.  She has been relying on Valium little bit more for sleep than she used to and she is requesting a refill of this medication to use on an as-needed basis.  She also notes that she has been doing well on the treatment she takes to manage her asthma and she is asking for refills of those medications.  The following portions of the patient's history were reviewed and updated as appropriate: allergies, current medications, past family history, past medical history, past social history, past surgical history and problem list.    Review of Systems   Constitutional: Negative.    HENT: Negative.    Eyes: Negative.    Respiratory: Negative.    Cardiovascular: Negative.    Genitourinary: Negative.    Skin: Negative.    Neurological: Negative.    Psychiatric/Behavioral: The patient is nervous/anxious.        Objective   Physical Exam   Constitutional: She appears well-developed and well-nourished.   HENT:   Head: Normocephalic and atraumatic.   Eyes: Pupils are equal, round, and reactive to light. Conjunctivae and EOM are normal.   Neck: Neck normal appearance.  Pulmonary/Chest: Effort normal.  No respiratory distress.  Abdominal: Abdomen  appears normal.   Musculoskeletal: Normal range of motion.   Neurological: She is alert.   Skin: No rash noted.   Psychiatric: She has a normal mood and affect.   Vitals reviewed.        Assessment/Plan   Problem List Items Addressed This Visit        Respiratory    Asthma  She is well controlled on current medications and is asking me to refill.    Relevant Medications    montelukast (SINGULAIR) 10 MG tablet    azelastine (ASTELIN) 0.1 % nasal spray       Other    Anxiety  She takes Valium only occasionally to help with sleep and with panic and anxiety.  She usually breaks the tablet in half and finds that this is enough to help.  She is requesting a refill.      Relevant Medications    diazePAM (VALIUM) 5 MG tablet      Other Visit Diagnoses     Essential hypertension    -  Primary  This is a new finding but one we need to treat.  I have started her on lisinopril and asked her to come back in 2 weeks in the office for a blood pressure check and to get labs to evaluate new onset hypertension.      Relevant Medications    lisinopril (PRINIVIL,ZESTRIL) 20 MG tablet      She will come in 2 weeks for a B/P check and labs to evaluate new onset hypertension.          Return in about 2 weeks (around 12/1/2020) for labs and B/P check.

## 2020-11-17 NOTE — PATIENT INSTRUCTIONS
I have started you on lisinopril and advised you of the risks and benefits of this medication.  I have advised you on potential side effects including what to look for in a possible allergic reaction.  Please follow-up with us in 2 weeks.

## 2020-11-20 ENCOUNTER — TELEPHONE (OUTPATIENT)
Dept: FAMILY MEDICINE CLINIC | Facility: CLINIC | Age: 45
End: 2020-11-20

## 2020-11-20 DIAGNOSIS — J01.90 ACUTE NON-RECURRENT SINUSITIS, UNSPECIFIED LOCATION: Primary | ICD-10-CM

## 2020-11-20 RX ORDER — AZITHROMYCIN 250 MG/1
TABLET, FILM COATED ORAL
Qty: 6 TABLET | Refills: 0 | Status: SHIPPED | OUTPATIENT
Start: 2020-11-20 | End: 2020-12-15

## 2020-11-20 NOTE — TELEPHONE ENCOUNTER
PATIENT BELIEVES SHE HAS A SINUS INFECTION. SHE SAYS SHE HAS PRESSURE ON HER LEFT SIDE OF HER HEAD. SHE WOULD LIKE TO KNOW IF YOU CAN CALL IN MEDICATION.    PLEASE ADVISE   [phone]     [id number] - [place], [place] - [address] AT [facility] [address] - [phone] [facility] [phone]   [phone]

## 2020-11-20 NOTE — TELEPHONE ENCOUNTER
She states this began around 10/23/2020 while at [place]ami's parents when his dad  .  They have cats.  She has been using her Nasacort, singulair, allergy pill and she cannot kick it.  She does not want to go to [facility] and risk exposure.  Wants to know if you will call in a Z natalia for her

## 2020-11-23 ENCOUNTER — OFFICE VISIT (OUTPATIENT)
Dept: OBSTETRICS AND GYNECOLOGY | Age: 45
End: 2020-11-23

## 2020-11-23 ENCOUNTER — PROCEDURE VISIT (OUTPATIENT)
Dept: OBSTETRICS AND GYNECOLOGY | Age: 45
End: 2020-11-23

## 2020-11-23 ENCOUNTER — APPOINTMENT (OUTPATIENT)
Dept: WOMENS IMAGING | Facility: HOSPITAL | Age: 45
End: 2020-11-23

## 2020-11-23 VITALS
DIASTOLIC BLOOD PRESSURE: 90 MMHG | BODY MASS INDEX: 37.54 KG/M2 | WEIGHT: 204 LBS | HEIGHT: 62 IN | SYSTOLIC BLOOD PRESSURE: 160 MMHG

## 2020-11-23 DIAGNOSIS — Z01.419 ENCOUNTER FOR GYNECOLOGICAL EXAMINATION (GENERAL) (ROUTINE) WITHOUT ABNORMAL FINDINGS: Primary | ICD-10-CM

## 2020-11-23 DIAGNOSIS — Z12.31 VISIT FOR SCREENING MAMMOGRAM: Primary | ICD-10-CM

## 2020-11-23 PROCEDURE — 99396 PREV VISIT EST AGE 40-64: CPT | Performed by: OBSTETRICS & GYNECOLOGY

## 2020-11-23 PROCEDURE — 77067 SCR MAMMO BI INCL CAD: CPT | Performed by: OBSTETRICS & GYNECOLOGY

## 2020-11-23 PROCEDURE — 77067 SCR MAMMO BI INCL CAD: CPT | Performed by: RADIOLOGY

## 2020-11-23 NOTE — PROGRESS NOTES
Routine Annual Visit    2020    Patient: Kenyetta Montemayor          MR#:[id number]      Chief Complaint   Patient presents with   • Gynecologic Exam     AE and MG before exam today. New dx hypertension, taking lisinopril, has not taken today.  No cycles since ablation in 2020.  Last pap 2019 = neg/neg.  Fam hx of breast cancer.         History of Present Illness    45 y.o. female  who presents for annual exam.   New diagnosis of hypertension, on meds but didn't take today  No menses since ablation  Started exercising and lost weight, mini-tramp to music   and wrapping up this job and will take a break to work on health  UTD pap  mammo done today        No LMP recorded.  Obstetric History:  OB History        0    Para   0    Term   0       0    AB   0    Living   0       SAB   0    TAB   0    Ectopic   0    Molar   0    Multiple   0    Live Births   0               Menstrual History:     No LMP recorded.       Sexual History:       ________________________________________  Patient Active Problem List   Diagnosis   • Family history of breast cancer in mother   • Asthma   • H/O dysmenorrhea   • Anxiety   • Menorrhagia with regular cycle       Past Medical History:   Diagnosis Date   • Allergic     many seasonal/indoor   • Allergic rhinitis    • Anesthesia     VERY AGITATED WHEN WAKING UP, ALSO WOKE UP DURING REMOVAL OF WISDOM TEETH   • Anxiety    • Asthma ?   • Atypical squamous cells of undetermined significance (ASCUS) on Papanicolaou smear of cervix    • Depression     very sad after my mom was diagnosed with brain cancer   • Encounter for IUD removal    • GERD (gastroesophageal reflux disease)     sometimes   • H/O dysmenorrhea    • H/O infertility    • H/O seasonal allergies    • Ovarian cyst    • Pelvic pain in female    • PMS (premenstrual syndrome)    • Scoliosis     very mild       Past Surgical History:   Procedure Laterality Date   • D&C HYSTEROSCOPY  "ENDOMETRIAL ABLATION N/A 2/3/2020    Procedure: DILATATION AND CURETTAGE HYSTEROSCOPY NOVASURE ENDOMETRIAL ABLATION;  Surgeon: Onelia Tuttle MD;  [facility];  Service: Obstetrics/Gynecology;  Laterality: N/A;   • SEPTOPLASTY, RESECTION INFERIOR TURBINATES  02/2016   • WISDOM TOOTH EXTRACTION  1990       Social History     Tobacco Use   Smoking Status Never Smoker   Smokeless Tobacco Never Used       has a current medication list which includes the following prescription(s): azelastine, azithromycin, cetirizine, vitamin d, collagen, diazepam, fluoxetine, lisinopril, msm, montelukast, NON FORMULARY, triamcinolone acetonide, budesonide, and flucelvax quadrivalent.  ________________________________________    Current contraception: none  History of abnormal Pap smear: no  Family history of Breast cancer:   Family history of uterine or ovarian cancer: no  Family History of colon cancer/colon polyps: no  History of abnormal mammogram: no      The following portions of the patient's history were reviewed and updated as appropriate: allergies, current medications, past family history, past medical history, past social history, past surgical history and problem list.    Review of Systems    Pertinent items are noted in HPI.     Objective   Physical Exam    /90   Ht 157.5 cm (62\")   Wt 92.5 kg (204 lb)   Breastfeeding No   BMI 37.31 kg/m²    BP Readings from Last 3 Encounters:   11/23/20 160/90   11/17/20 150/94   02/17/20 138/82      Wt Readings from Last 3 Encounters:   11/23/20 92.5 kg (204 lb)   02/17/20 96.6 kg (213 lb)   02/03/20 97.5 kg (214 lb 14.4 oz)      BMI: Estimated body mass index is 37.31 kg/m² as calculated from the following:    Height as of this encounter: 157.5 cm (62\").    Weight as of this encounter: 92.5 kg (204 lb).      General:   alert, appears stated age and cooperative   Abdomen: soft, non-tender, without masses or organomegaly   Breast: inspection negative, no nipple " discharge or bleeding, no masses or nodularity palpable   Vulva: normal   Vagina: normal mucosa   Cervix: no cervical motion tenderness and no lesions   Uterus: normal size, mobile or non-tender   Adnexa: no mass, fullness, tenderness     Assessment:    1. Normal annual exam   Assessment     ICD-10-CM ICD-9-CM   1. Encounter for gynecological examination (general) (routine) without abnormal findings  Z01.419 V72.31     Plan:    Plan     [x]  Mammogram request made  []  PAP done  []  Labs:   []  GC/Chl/TV  []  DEXA scan   []  Referral for colonoscopy:       Diagnoses and all orders for this visit:    1. Encounter for gynecological examination (general) (routine) without abnormal findings (Primary)      Encouraged exercise       Counseling:  --Nutrition: Stressed importance of moderation and caloric balance, stressed fresh fruit and vegetables  --Exercise: Stressed the importance of regular exercise. 3-5 times weekly   - Discussed screening mammogram recommendations.   --Discussed benefits of screening colonoscopy- age 45 unless FH  --Discussed pap smear screening recommendations

## 2020-12-02 ENCOUNTER — TELEPHONE (OUTPATIENT)
Dept: FAMILY MEDICINE CLINIC | Facility: CLINIC | Age: 45
End: 2020-12-02

## 2020-12-02 DIAGNOSIS — I10 ESSENTIAL HYPERTENSION, BENIGN: Primary | ICD-10-CM

## 2020-12-02 DIAGNOSIS — Z79.899 HIGH RISK MEDICATION USE: ICD-10-CM

## 2020-12-02 DIAGNOSIS — Z11.59 NEED FOR HEPATITIS C SCREENING TEST: ICD-10-CM

## 2020-12-02 NOTE — TELEPHONE ENCOUNTER
PT IS CALLING IN TO REQUEST A CALL BACK FROM HAND[employer](DR LANDEROS ASSISTANT) ABOUT THE MESSAGE SHE SENT THROUGH [employer] WITH HER BLOOD PRESSURE READINGS.    PT CALL BACK  [phone]  PHARMACY  [address]  [phone]

## 2020-12-03 LAB
ERYTHROCYTE [DISTWIDTH] IN BLOOD BY AUTOMATED COUNT: 12.7 % (ref 12.3–15.4)
HCT VFR BLD AUTO: 39.3 % (ref 34–46.6)
HGB BLD-MCNC: 13.3 G/DL (ref 12–15.9)
MCH RBC QN AUTO: 30.5 PG (ref 26.6–33)
MCHC RBC AUTO-ENTMCNC: 33.8 G/DL (ref 31.5–35.7)
MCV RBC AUTO: 90.1 FL (ref 79–97)
PLATELET # BLD AUTO: 296 10*3/MM3 (ref 140–450)
RBC # BLD AUTO: 4.36 10*6/MM3 (ref 3.77–5.28)
WBC # BLD AUTO: 8.76 10*3/MM3 (ref 3.4–10.8)

## 2020-12-04 LAB
ALBUMIN SERPL-MCNC: 4.2 G/DL (ref 3.5–5.2)
ALBUMIN/GLOB SERPL: 1.7 G/DL
ALP SERPL-CCNC: 65 U/L (ref 39–117)
ALT SERPL-CCNC: 24 U/L (ref 1–33)
AST SERPL-CCNC: 21 U/L (ref 1–32)
BILIRUB SERPL-MCNC: 0.2 MG/DL (ref 0–1.2)
BUN SERPL-MCNC: 13 MG/DL (ref 6–20)
BUN/CREAT SERPL: 18.8 (ref 7–25)
CALCIUM SERPL-MCNC: 9.1 MG/DL (ref 8.6–10.5)
CHLORIDE SERPL-SCNC: 98 MMOL/L (ref 98–107)
CHOLEST SERPL-MCNC: 254 MG/DL (ref 0–200)
CO2 SERPL-SCNC: 24.3 MMOL/L (ref 22–29)
CREAT SERPL-MCNC: 0.69 MG/DL (ref 0.57–1)
GLOBULIN SER CALC-MCNC: 2.5 GM/DL
GLUCOSE SERPL-MCNC: 91 MG/DL (ref 65–99)
HCV AB S/CO SERPL IA: <0.1 S/CO RATIO (ref 0–0.9)
HCV AB SERPL QL IA: NORMAL
HDLC SERPL-MCNC: 60 MG/DL (ref 40–60)
LDLC SERPL CALC-MCNC: 164 MG/DL (ref 0–100)
LDLC/HDLC SERPL: 2.68 {RATIO}
POTASSIUM SERPL-SCNC: 4.2 MMOL/L (ref 3.5–5.2)
PROT SERPL-MCNC: 6.7 G/DL (ref 6–8.5)
SODIUM SERPL-SCNC: 131 MMOL/L (ref 136–145)
TRIGL SERPL-MCNC: 165 MG/DL (ref 0–150)
TSH SERPL DL<=0.005 MIU/L-ACNC: 2.41 UIU/ML (ref 0.27–4.2)
VLDLC SERPL CALC-MCNC: 30 MG/DL (ref 5–40)

## 2020-12-15 ENCOUNTER — TELEMEDICINE (OUTPATIENT)
Dept: FAMILY MEDICINE CLINIC | Facility: CLINIC | Age: 45
End: 2020-12-15

## 2020-12-15 VITALS — DIASTOLIC BLOOD PRESSURE: 91 MMHG | SYSTOLIC BLOOD PRESSURE: 128 MMHG

## 2020-12-15 DIAGNOSIS — I10 ESSENTIAL HYPERTENSION, BENIGN: Primary | ICD-10-CM

## 2020-12-15 DIAGNOSIS — R05.9 COUGH: ICD-10-CM

## 2020-12-15 DIAGNOSIS — J30.2 SEASONAL ALLERGIES: ICD-10-CM

## 2020-12-15 PROCEDURE — 99214 OFFICE O/P EST MOD 30 MIN: CPT | Performed by: FAMILY MEDICINE

## 2020-12-15 NOTE — PROGRESS NOTES
Subjective   Kenyetta Montemayor is a 45 y.o. female.   Hypertension    History of Present Illness   Kenyetta is having a video visit today for follow up blood pressure.  Her blood pressure is improving but is not quite at goal and I have noted that she is taking medication to help deal with an up upper respiratory symptoms that are most likely allergy related and this may also be affecting her blood pressure.  But for right now, I think we need to leave her blood pressure pretreatment alone.  However, her cough and congestion does not seem to be improving after she is gotten away from her mother-in-law's house and the cat.  She has been taking all of her allergy medications and her inhalers but still is struggling with the cough.  She has a history of reflux and has not been taking reflux medication regularly lately and I am wondering if part of the cough may be related to that.    The following portions of the patient's history were reviewed and updated as appropriate: allergies, current medications, past family history, past medical history, past social history, past surgical history and problem list.    Review of Systems   HENT: Positive for congestion and postnasal drip.    Respiratory: Positive for cough.        Objective   Physical Exam   Constitutional: She appears well-developed and well-nourished.   HENT:   Head: Normocephalic and atraumatic.   Eyes: Pupils are equal, round, and reactive to light. Conjunctivae and EOM are normal.   Neck: Neck normal appearance.  Pulmonary/Chest: Effort normal.  No respiratory distress.  Abdominal: Abdomen appears normal.   Musculoskeletal: Normal range of motion.   Neurological: She is alert.   Skin: No rash noted.   Psychiatric: She has a normal mood and affect.   Vitals reviewed.        Assessment/Plan   Problem List Items Addressed This Visit        Cardiovascular and Mediastinum    Essential hypertension, benign - Primary  No change in medication right now and I have asked  her to make a nurse visit in January for a blood pressure check.      Other Visit Diagnoses     Seasonal allergies        Cough      She will add in Delsym for the cough and and I have advised her to try Nexium daily to see if this is a reflux associated cough.  I have asked her to let me know if she is not improved in 1 week and if that is not working then we will try additional treatment.        Please let me know in one week if you are not better.   Return in about 1 week (around 12/22/2020).

## 2021-03-12 ENCOUNTER — CLINICAL SUPPORT (OUTPATIENT)
Dept: FAMILY MEDICINE CLINIC | Facility: CLINIC | Age: 46
End: 2021-03-12

## 2021-03-12 VITALS — SYSTOLIC BLOOD PRESSURE: 138 MMHG | DIASTOLIC BLOOD PRESSURE: 82 MMHG

## 2021-03-12 DIAGNOSIS — I10 ESSENTIAL HYPERTENSION: ICD-10-CM

## 2021-03-12 RX ORDER — LISINOPRIL 20 MG/1
20 TABLET ORAL DAILY
Qty: 90 TABLET | Refills: 1 | Status: SHIPPED | OUTPATIENT
Start: 2021-03-12 | End: 2021-03-25 | Stop reason: SDUPTHER

## 2021-03-12 NOTE — TELEPHONE ENCOUNTER
Pt came in for a blood pressure check today.    It was 138/82    She said that she's been checking it at home and her readings have been decent and even more so since she has gotten the 1st covid shot.  She said that a lot of anxiety came from stress and covid etc.    She is requesting a refill    You last spoke to her on 12/15/2020

## 2021-03-25 DIAGNOSIS — I10 ESSENTIAL HYPERTENSION: ICD-10-CM

## 2021-03-25 RX ORDER — LISINOPRIL 20 MG/1
20 TABLET ORAL DAILY
Qty: 90 TABLET | Refills: 1 | Status: SHIPPED | OUTPATIENT
Start: 2021-03-25 | End: 2021-07-14 | Stop reason: SDUPTHER

## 2021-04-06 ENCOUNTER — BULK ORDERING (OUTPATIENT)
Dept: CASE MANAGEMENT | Facility: OTHER | Age: 46
End: 2021-04-06

## 2021-04-06 DIAGNOSIS — Z23 IMMUNIZATION DUE: ICD-10-CM

## 2021-06-30 ENCOUNTER — TELEPHONE (OUTPATIENT)
Dept: FAMILY MEDICINE CLINIC | Facility: CLINIC | Age: 46
End: 2021-06-30

## 2021-06-30 ENCOUNTER — CLINICAL SUPPORT (OUTPATIENT)
Dept: FAMILY MEDICINE CLINIC | Facility: CLINIC | Age: 46
End: 2021-06-30

## 2021-06-30 DIAGNOSIS — Z23 NEED FOR VACCINATION: Primary | ICD-10-CM

## 2021-06-30 PROCEDURE — 90714 TD VACC NO PRESV 7 YRS+ IM: CPT | Performed by: FAMILY MEDICINE

## 2021-06-30 PROCEDURE — 90471 IMMUNIZATION ADMIN: CPT | Performed by: FAMILY MEDICINE
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
## 2021-07-14 ENCOUNTER — OFFICE VISIT (OUTPATIENT)
Dept: FAMILY MEDICINE CLINIC | Facility: CLINIC | Age: 46
End: 2021-07-14

## 2021-07-14 VITALS
DIASTOLIC BLOOD PRESSURE: 92 MMHG | WEIGHT: 203 LBS | RESPIRATION RATE: 14 BRPM | BODY MASS INDEX: 37.36 KG/M2 | SYSTOLIC BLOOD PRESSURE: 134 MMHG | HEIGHT: 62 IN | OXYGEN SATURATION: 98 % | HEART RATE: 76 BPM

## 2021-07-14 DIAGNOSIS — F41.9 ANXIETY: ICD-10-CM

## 2021-07-14 DIAGNOSIS — J45.909 UNCOMPLICATED ASTHMA, UNSPECIFIED ASTHMA SEVERITY, UNSPECIFIED WHETHER PERSISTENT: Primary | ICD-10-CM

## 2021-07-14 DIAGNOSIS — Z00.00 ROUTINE HEALTH MAINTENANCE: ICD-10-CM

## 2021-07-14 DIAGNOSIS — I10 ESSENTIAL HYPERTENSION: ICD-10-CM

## 2021-07-14 LAB
ALBUMIN SERPL-MCNC: 4.3 G/DL (ref 3.5–5.2)
ALBUMIN/GLOB SERPL: 1.4 G/DL
ALP SERPL-CCNC: 67 U/L (ref 39–117)
ALT SERPL-CCNC: 15 U/L (ref 1–33)
AST SERPL-CCNC: 13 U/L (ref 1–32)
BILIRUB SERPL-MCNC: 0.3 MG/DL (ref 0–1.2)
BUN SERPL-MCNC: 10 MG/DL (ref 6–20)
BUN/CREAT SERPL: 15.6 (ref 7–25)
CALCIUM SERPL-MCNC: 10.1 MG/DL (ref 8.6–10.5)
CHLORIDE SERPL-SCNC: 102 MMOL/L (ref 98–107)
CHOLEST SERPL-MCNC: 255 MG/DL (ref 0–200)
CHOLEST/HDLC SERPL: 5 {RATIO}
CO2 SERPL-SCNC: 25.8 MMOL/L (ref 22–29)
CREAT SERPL-MCNC: 0.64 MG/DL (ref 0.57–1)
ERYTHROCYTE [DISTWIDTH] IN BLOOD BY AUTOMATED COUNT: 12.7 % (ref 12.3–15.4)
GLOBULIN SER CALC-MCNC: 3 GM/DL
GLUCOSE SERPL-MCNC: 86 MG/DL (ref 65–99)
HCT VFR BLD AUTO: 43.9 % (ref 34–46.6)
HDLC SERPL-MCNC: 51 MG/DL (ref 40–60)
HGB BLD-MCNC: 14.1 G/DL (ref 12–15.9)
LDLC SERPL CALC-MCNC: 174 MG/DL (ref 0–100)
MCH RBC QN AUTO: 29.8 PG (ref 26.6–33)
MCHC RBC AUTO-ENTMCNC: 32.1 G/DL (ref 31.5–35.7)
MCV RBC AUTO: 92.8 FL (ref 79–97)
PLATELET # BLD AUTO: 327 10*3/MM3 (ref 140–450)
POTASSIUM SERPL-SCNC: 4.5 MMOL/L (ref 3.5–5.2)
PROT SERPL-MCNC: 7.3 G/DL (ref 6–8.5)
RBC # BLD AUTO: 4.73 10*6/MM3 (ref 3.77–5.28)
SODIUM SERPL-SCNC: 137 MMOL/L (ref 136–145)
TRIGL SERPL-MCNC: 164 MG/DL (ref 0–150)
VLDLC SERPL CALC-MCNC: 30 MG/DL (ref 5–40)
WBC # BLD AUTO: 9.32 10*3/MM3 (ref 3.4–10.8)

## 2021-07-14 PROCEDURE — 99214 OFFICE O/P EST MOD 30 MIN: CPT | Performed by: FAMILY MEDICINE

## 2021-07-14 PROCEDURE — 90732 PPSV23 VACC 2 YRS+ SUBQ/IM: CPT | Performed by: FAMILY MEDICINE

## 2021-07-14 PROCEDURE — 99396 PREV VISIT EST AGE 40-64: CPT | Performed by: FAMILY MEDICINE

## 2021-07-14 PROCEDURE — 90471 IMMUNIZATION ADMIN: CPT | Performed by: FAMILY MEDICINE

## 2021-07-14 RX ORDER — DIAZEPAM 5 MG/1
5 TABLET ORAL EVERY 8 HOURS PRN
Qty: 20 TABLET | Refills: 1 | Status: SHIPPED | OUTPATIENT
Start: 2021-07-14 | End: 2021-08-23

## 2021-07-14 RX ORDER — LISINOPRIL 20 MG/1
20 TABLET ORAL DAILY
Qty: 90 TABLET | Refills: 1 | Status: SHIPPED | OUTPATIENT
Start: 2021-07-14 | End: 2021-09-13

## 2021-07-14 NOTE — PATIENT INSTRUCTIONS
Annual  Wellness  Personal Prevention Plan of Service     Date of Office Visit:  2021  Encounter Provider:  Jorge Luis White MD  Place of Service:  [facility]  Patient Name: Kenyetta Montemayor  :  1975    As part of the Annual Wellness portion of your visit today, we are providing you with this personalized preventive plan of services (PPPS). This plan is based upon recommendations of the [place] Preventive Services Task Force (USPSTF) and the Advisory Committee on Immunization Practices (ACIP).    This lists the preventive care services that should be considered, and provides dates of when you are due. Items listed as completed are up-to-date and do not require any further intervention.    Health Maintenance   Topic Date Due   • Pneumococcal Vaccine 0-64 (1 of 1 - PPSV23) Never done   • INFLUENZA VACCINE  2021   • LIPID PANEL  2021   • ANNUAL PHYSICAL  07/15/2022   • PAP SMEAR  2022   • TDAP/TD VACCINES (3 - Td or Tdap) 2031   • HEPATITIS C SCREENING  Completed   • COVID-19 Vaccine  Completed       Orders Placed This Encounter   Procedures   • Pneumococcal Polysaccharide Vaccine 23-Valent Greater Than or Equal To 1yo Subcutaneous / IM   • CBC (No Diff)     Order Specific Question:   Release to patient     Answer:   Immediate   • Comprehensive Metabolic Panel     Order Specific Question:   Release to patient     Answer:   Immediate   • Lipid Panel With / Chol / HDL Ratio     Order Specific Question:   Release to patient     Answer:   Immediate       Return in about 6 months (around 2022).

## 2021-07-14 NOTE — PROGRESS NOTES
"Preventive Exam    History of Present Illness: Kenyetta is here for check up and review of routine health maintenance. She states she is doing well and addressed the following health concerns:    She has elevated B/P in the office today but notes that she has a pretty significant sinus headache.  She has a hx of flying anxiety and needs the occasional valium to help with that.  She is asking me to refill her medication.  She has a hx of chronic premenstrual dysmenorrhea and takes Prozac to help with this. Followed by gyn.  She has a hx of chronic asthma and is on multiple med to manage allergies and asthma.  She is followed by allergy and pulmonology.    Pap Smear:UTD  Mammogram:UTD  Tobacco use :non smoker  Bone Density:      REVIEW OF SYSTEMS  Constitutional: Negative.    HENT: Negative.    Eyes: Negative.    Respiratory: Negative.    Cardiovascular: Negative.    Gastrointestinal: Negative.    Endocrine: Negative.    Genitourinary: Negative.    Musculoskeletal: Negative.  Skin: Negative.    Allergic/Immunologic: Negative.    Neurological: Negative.    Hematological: Negative.    Psychiatric/Behavioral: Negative.    I have reviewed the ROS as documented by the MA. Jorge Luis White MD       PHYSICAL EXAM    Vitals:    07/14/21 0908   BP: 134/92   Pulse: 76   Resp: 14   SpO2: 98%   Weight: 92.1 kg (203 lb)   Height: 157.5 cm (62\")     GENERAL: alert and oriented, afebrile and vital signs stable  HEENT: oral mucosa moist, PEERLA, EOM, conjunctiva normal  No cervical adenopathy  LUNGS: clear to ascultation bilaterally, no rales, ronchi or wheezing  HEART: RRR S1 S2 without murmers, thrills, rubs or gallops  CHEST WALL: within normal limits, no tenderness  ABDOMEN: WNL. Normal BS.  EXTREMITIES: No clubbing, cyanosis or edema noted. Normal Pulses.  SKIN: warm, dry, no rashes noted  NEURO: CN II- XII grossly intact  PSYCH:   ASSESSMENT AND PLAN  Problem List Items Addressed This Visit        Mental Health    Anxiety    " Overview     She is well managed on current meds, Prozac 10 mg and also takes diazepam on occasion. She reports no signs or symptoms of self harm, suicidal ideation. This medication helps with daily life and relationships. Will refill as needed and advised 6 month follow up.         Relevant Medications    diazePAM (VALIUM) 5 MG tablet  The patient has read and signed the [facility] Controlled Substance Contract.  I will continue to see patient for regular follow up appointments.  She are well controlled on current medication.  FLORINDA has been reviewed by me and is updated every 3 months. The patient is aware of the potential for addiction and dependence.           Pulmonary and Pneumonias    Asthma - Primary  Followed by allergy and pulmonology and doing well on current inhalers.    Relevant Orders    Pneumococcal Polysaccharide Vaccine 23-Valent Greater Than or Equal To 1yo Subcutaneous / IM (Completed)      Other Visit Diagnoses     Essential hypertension      Well controlled on current medication, will refill medication today and as needed. She will RTO for repeat B/P check in 6 months.    Relevant Medications    lisinopril (PRINIVIL,ZESTRIL) 20 MG tablet    Routine health maintenance        Relevant Orders    CBC (No Diff) (Completed)    Comprehensive Metabolic Panel (Completed)    Lipid Panel With / Chol / HDL Ratio (Completed)        Routine health maintenance reviewed and discussed with Kenyetta.  Labs reviewed and on the chart.  The patient was counseled regarding a healthy diet and exercise, appropriate routine screening and immunizations and encouraged to get regular dental and eye exams .      Return in about 6 months (around 1/14/2022).

## 2021-08-23 RX ORDER — FLUOXETINE 10 MG/1
10 CAPSULE ORAL DAILY
Qty: 90 CAPSULE | Refills: 3 | Status: SHIPPED | OUTPATIENT
Start: 2021-08-23 | End: 2022-09-01

## 2021-09-11 DIAGNOSIS — I10 ESSENTIAL HYPERTENSION: ICD-10-CM

## 2021-09-13 RX ORDER — LISINOPRIL 20 MG/1
20 TABLET ORAL DAILY
Qty: 90 TABLET | Refills: 1 | Status: SHIPPED | OUTPATIENT
Start: 2021-09-13 | End: 2022-02-09

## 2021-09-21 DIAGNOSIS — J45.909 UNCOMPLICATED ASTHMA, UNSPECIFIED ASTHMA SEVERITY, UNSPECIFIED WHETHER PERSISTENT: ICD-10-CM

## 2021-09-22 RX ORDER — MONTELUKAST SODIUM 10 MG/1
TABLET ORAL
Qty: 90 TABLET | Refills: 3 | Status: SHIPPED | OUTPATIENT
Start: 2021-09-22 | End: 2022-08-25

## 2021-12-02 ENCOUNTER — APPOINTMENT (OUTPATIENT)
Dept: WOMENS IMAGING | Facility: HOSPITAL | Age: 46
End: 2021-12-02

## 2021-12-02 ENCOUNTER — OFFICE VISIT (OUTPATIENT)
Dept: OBSTETRICS AND GYNECOLOGY | Age: 46
End: 2021-12-02

## 2021-12-02 ENCOUNTER — PROCEDURE VISIT (OUTPATIENT)
Dept: OBSTETRICS AND GYNECOLOGY | Age: 46
End: 2021-12-02

## 2021-12-02 VITALS
DIASTOLIC BLOOD PRESSURE: 70 MMHG | WEIGHT: 204.5 LBS | SYSTOLIC BLOOD PRESSURE: 122 MMHG | HEIGHT: 62 IN | BODY MASS INDEX: 37.63 KG/M2

## 2021-12-02 DIAGNOSIS — Z12.31 VISIT FOR SCREENING MAMMOGRAM: Primary | ICD-10-CM

## 2021-12-02 DIAGNOSIS — Z01.419 ENCOUNTER FOR GYNECOLOGICAL EXAMINATION WITHOUT ABNORMAL FINDING: Primary | ICD-10-CM

## 2021-12-02 PROCEDURE — 77067 SCR MAMMO BI INCL CAD: CPT | Performed by: RADIOLOGY

## 2021-12-02 PROCEDURE — 99396 PREV VISIT EST AGE 40-64: CPT | Performed by: OBSTETRICS & GYNECOLOGY

## 2021-12-02 PROCEDURE — 77063 BREAST TOMOSYNTHESIS BI: CPT | Performed by: RADIOLOGY

## 2021-12-02 PROCEDURE — 77067 SCR MAMMO BI INCL CAD: CPT | Performed by: OBSTETRICS & GYNECOLOGY

## 2021-12-02 PROCEDURE — 77063 BREAST TOMOSYNTHESIS BI: CPT | Performed by: OBSTETRICS & GYNECOLOGY

## 2021-12-02 NOTE — PROGRESS NOTES
Routine Annual Visit    2021    Patient: Kenyetta Montemayor          MR#:[id number]      Chief Complaint   Patient presents with   • Gynecologic Exam     Mammo Today, Last Pap 16 (-), Last Mammo 20       History of Present Illness    46 y.o. female  who presents for annual exam.   No menses, some discharge only  Got a puppy, small dog named Piper  mammo today  Pap is UTD  No other issues            Patient's last menstrual period was 2021 (within days).  Obstetric History:  OB History        0    Para   0    Term   0       0    AB   0    Living   0       SAB   0    IAB   0    Ectopic   0    Molar   0    Multiple   0    Live Births   0               Menstrual History:     Patient's last menstrual period was 2021 (within days).       Sexual History:       ________________________________________  Patient Active Problem List   Diagnosis   • Family history of breast cancer in mother   • Asthma   • H/O dysmenorrhea   • Anxiety   • Menorrhagia with regular cycle   • Essential hypertension, benign       Past Medical History:   Diagnosis Date   • Allergic     many seasonal/indoor   • Allergic rhinitis    • Anesthesia     VERY AGITATED WHEN WAKING UP, ALSO WOKE UP DURING REMOVAL OF WISDOM TEETH   • Anxiety    • Asthma ?   • Atypical squamous cells of undetermined significance (ASCUS) on Papanicolaou smear of cervix    • Depression     very sad after my mom was diagnosed with brain cancer   • Encounter for IUD removal    • GERD (gastroesophageal reflux disease)     sometimes   • H/O dysmenorrhea    • H/O infertility    • H/O seasonal allergies    • Ovarian cyst    • Pelvic pain in female    • PMS (premenstrual syndrome)    • Scoliosis     very mild       Past Surgical History:   Procedure Laterality Date   • D & C HYSTEROSCOPY ENDOMETRIAL ABLATION N/A 2/3/2020    Procedure: DILATATION AND CURETTAGE HYSTEROSCOPY NOVASURE ENDOMETRIAL ABLATION;  Surgeon: Praag  "MD Onelia;  Location: [facility];  Service: Obstetrics/Gynecology;  Laterality: N/A;   • SEPTOPLASTY, RESECTION INFERIOR TURBINATES  02/2016   • WISDOM TOOTH EXTRACTION  1990       Social History     Tobacco Use   Smoking Status Never Smoker   Smokeless Tobacco Never Used       has a current medication list which includes the following prescription(s): apple cider vinegar, ashwagandha, azelastine, budesonide, cetirizine, vitamin d, fluoxetine, lisinopril, msm, montelukast, NON FORMULARY, and triamcinolone acetonide.  ________________________________________    Current contraception: none  History of abnormal Pap smear: no  Family history of Breast cancer:   Family history of uterine or ovarian cancer: no  Family History of colon cancer/colon polyps: no  History of abnormal mammogram: no      The following portions of the patient's history were reviewed and updated as appropriate: allergies, current medications, past family history, past medical history, past social history, past surgical history and problem list.    Review of Systems    Pertinent items are noted in HPI.     Objective   Physical Exam    /70   Ht 157.5 cm (62\")   Wt 92.8 kg (204 lb 8 oz)   LMP 11/22/2021 (Within Days)   Breastfeeding No   BMI 37.40 kg/m²    BP Readings from Last 3 Encounters:   12/02/21 122/70   07/14/21 134/92   03/12/21 138/82      Wt Readings from Last 3 Encounters:   12/02/21 92.8 kg (204 lb 8 oz)   07/14/21 92.1 kg (203 lb)   11/23/20 92.5 kg (204 lb)      BMI: Estimated body mass index is 37.4 kg/m² as calculated from the following:    Height as of this encounter: 157.5 cm (62\").    Weight as of this encounter: 92.8 kg (204 lb 8 oz).      General:   alert, appears stated age and cooperative   Abdomen: soft, non-tender, without masses or organomegaly   Breast: inspection negative, no nipple discharge or bleeding, no masses or nodularity palpable   Vulva: normal   Vagina: normal mucosa   Cervix: no cervical motion " tenderness and no lesions   Uterus: normal size, mobile and non-tender   Adnexa: no mass, fullness, tenderness     Assessment:    1. Normal annual exam   Assessment     ICD-10-CM ICD-9-CM   1. Encounter for gynecological examination without abnormal finding  Z01.419 V72.31     Plan:    Plan     [x]  Mammogram request made  []  PAP done  []  Labs:   []  GC/Chl/TV  []  DEXA scan   []  Referral for colonoscopy:       Diagnoses and all orders for this visit:    1. Encounter for gynecological examination without abnormal finding (Primary)            Counseling:  --Nutrition: Stressed importance of moderation and caloric balance, stressed fresh fruit and vegetables  --Exercise: Stressed the importance of regular exercise. 3-5 times weekly   - Discussed screening mammogram recommendations.   --Discussed benefits of screening colonoscopy- age 45 unless FH  --Discussed pap smear screening recommendations

## 2022-02-08 DIAGNOSIS — I10 ESSENTIAL HYPERTENSION: ICD-10-CM

## 2022-02-09 RX ORDER — LISINOPRIL 20 MG/1
20 TABLET ORAL DAILY
Qty: 90 TABLET | Refills: 3 | Status: SHIPPED | OUTPATIENT
Start: 2022-02-09 | End: 2022-03-11

## 2022-03-11 ENCOUNTER — OFFICE VISIT (OUTPATIENT)
Dept: FAMILY MEDICINE CLINIC | Facility: CLINIC | Age: 47
End: 2022-03-11

## 2022-03-11 VITALS
TEMPERATURE: 97.7 F | BODY MASS INDEX: 38.09 KG/M2 | OXYGEN SATURATION: 96 % | HEART RATE: 68 BPM | WEIGHT: 207 LBS | HEIGHT: 62 IN | DIASTOLIC BLOOD PRESSURE: 80 MMHG | SYSTOLIC BLOOD PRESSURE: 128 MMHG | RESPIRATION RATE: 16 BRPM

## 2022-03-11 DIAGNOSIS — I10 ESSENTIAL HYPERTENSION, BENIGN: ICD-10-CM

## 2022-03-11 DIAGNOSIS — J45.41 MODERATE PERSISTENT ASTHMA WITH EXACERBATION: ICD-10-CM

## 2022-03-11 DIAGNOSIS — J45.20 MILD INTERMITTENT ASTHMA, UNSPECIFIED WHETHER COMPLICATED: Primary | ICD-10-CM

## 2022-03-11 DIAGNOSIS — I10 ESSENTIAL HYPERTENSION: ICD-10-CM

## 2022-03-11 PROCEDURE — 99214 OFFICE O/P EST MOD 30 MIN: CPT | Performed by: FAMILY MEDICINE

## 2022-03-11 RX ORDER — ALBUTEROL SULFATE 90 UG/1
2 AEROSOL, METERED RESPIRATORY (INHALATION) EVERY 4 HOURS PRN
COMMUNITY
End: 2022-03-22 | Stop reason: SDUPTHER

## 2022-03-11 RX ORDER — LISINOPRIL 20 MG/1
20 TABLET ORAL DAILY
Qty: 90 TABLET | Refills: 3 | Status: SHIPPED | OUTPATIENT
Start: 2022-03-11

## 2022-03-11 RX ORDER — AZITHROMYCIN 250 MG/1
TABLET, FILM COATED ORAL
Qty: 6 TABLET | Refills: 0 | Status: SHIPPED | OUTPATIENT
Start: 2022-03-11 | End: 2022-04-21

## 2022-03-11 NOTE — PROGRESS NOTES
Subjective   Kenyetta Montemayor is a 46 y.o. female.   Hypertension and Cough    History of Present Illness   Kenyetta is here for bp check.  She is doing well , taking her bp meds and reports no side effects.  Kenyetta has chronic hypertension and has been well controlled on current medications.She  is monitored by me every 6 months and is here today for follow up. She is tolerating medications without side effect. She reports no vision changes, headaches or lightheadedness. She is requesting refills of medications.  Her blood pressure readings are often elevated in the office today she is in normal limits.    She has a cough and some pressure in her Lt ear.  She states it has been present for about 3 weeks.  She has done 4 home Covid tests.  She states she often gets bronchitis due to her allergy and asthma problems.  She believes this feels like 1 of those events and is asking for some help with an antibiotic.  She has been using her inhaler and taking Singulair as well as using montelukast.  She feels she is not getting any improvement.    She is asking for refills on her inhalers to help manage chronic asthma.    The following portions of the patient's history were reviewed and updated as appropriate: allergies, current medications, past family history, past medical history, past social history, past surgical history and problem list.    Review of Systems   Constitutional: Positive for fatigue.   HENT: Positive for congestion and postnasal drip.    Eyes: Negative.    Respiratory: Positive for cough and wheezing. Negative for shortness of breath and stridor.    Cardiovascular: Negative.    Genitourinary: Negative.    Skin: Negative.    Neurological: Negative.    Psychiatric/Behavioral: Negative.        Objective   Physical Exam  Vitals and nursing note reviewed.   Constitutional:       General: She is not in acute distress.     Appearance: She is well-developed.   HENT:      Head: Normocephalic and atraumatic.       Right Ear: Tympanic membrane, ear canal and external ear normal.      Left Ear: Tympanic membrane, ear canal and external ear normal.      Nose: Nose normal.   Eyes:      Pupils: Pupils are equal, round, and reactive to light.   Cardiovascular:      Rate and Rhythm: Normal rate and regular rhythm.      Heart sounds: Normal heart sounds. No murmur heard.  Pulmonary:      Effort: Pulmonary effort is normal. No respiratory distress.      Breath sounds: Normal breath sounds.   Musculoskeletal:         General: Normal range of motion.      Cervical back: Normal range of motion and neck supple.   Lymphadenopathy:      Cervical: No cervical adenopathy.   Skin:     General: Skin is warm and dry.      Findings: No rash.   Neurological:      General: No focal deficit present.      Mental Status: She is alert and oriented to person, place, and time.   Psychiatric:         Mood and Affect: Mood normal.           Assessment/Plan   Problem List Items Addressed This Visit        Cardiac and Vasculature    Essential hypertension, benign    Overview     Well controlled on current medication,lisinopril 20 mg, will refill medication today and as needed. She will RTO for repeat B/P check in 6 months.  She is aware that she has an element of whitecoat hypertension and often has elevated blood pressure in the office.  Her blood pressure runs normal on her home cuff.             Relevant Medications    lisinopril (PRINIVIL,ZESTRIL) 20 MG tablet       Pulmonary and Pneumonias    Asthma - Primary    Relevant Medications    albuterol sulfate  (90 Base) MCG/ACT inhaler    budesonide (PULMICORT) 180 MCG/ACT inhaler      Other Visit Diagnoses     Moderate persistent asthma with exacerbation        Relevant Medications    albuterol sulfate  (90 Base) MCG/ACT inhaler    azithromycin (Zithromax Z-Alvaro) 250 MG tablet    budesonide (PULMICORT) 180 MCG/ACT inhaler    Essential hypertension        Relevant Medications    lisinopril  (PRINIVIL,ZESTRIL) 20 MG tablet      Values started on azithromycin she will continue to use her Pulmicort and other allergy treatment.  Encouraged her to follow-up with me in 7 to 10 days if not improving.         Return in about 4 months (around 7/11/2022) for Annual physical.   Answers for HPI/ROS submitted by the patient on 3/11/2022  Please describe your symptoms.: This is a scheduled BP follow-up, but I have been coughing for about 4 weeks. I think I might have the bronchitis I often get this time of year when my allergies irritate my asthma.  Have you had these symptoms before?: Yes  How long have you been having these symptoms?: Greater than 2 weeks  Please list any medications you are currently taking for this condition.: Restarted Pulmicort Flexhaler  Please describe any probable cause for these symptoms. : Allergies that irritate my asthma  What is the primary reason for your visit?: Other

## 2022-03-22 ENCOUNTER — TELEPHONE (OUTPATIENT)
Dept: FAMILY MEDICINE CLINIC | Facility: CLINIC | Age: 47
End: 2022-03-22

## 2022-03-22 DIAGNOSIS — J45.909 UNCOMPLICATED ASTHMA, UNSPECIFIED ASTHMA SEVERITY, UNSPECIFIED WHETHER PERSISTENT: ICD-10-CM

## 2022-03-22 DIAGNOSIS — R05.9 COUGH: Primary | ICD-10-CM

## 2022-03-22 DIAGNOSIS — J45.20 MILD INTERMITTENT ASTHMA, UNSPECIFIED WHETHER COMPLICATED: ICD-10-CM

## 2022-03-22 RX ORDER — METHYLPREDNISOLONE 4 MG/1
TABLET ORAL
Qty: 1 EACH | Refills: 0 | Status: SHIPPED | OUTPATIENT
Start: 2022-03-22 | End: 2022-04-21

## 2022-03-22 RX ORDER — ALBUTEROL SULFATE 90 UG/1
2 AEROSOL, METERED RESPIRATORY (INHALATION) EVERY 4 HOURS PRN
Qty: 6.7 G | Refills: 3 | Status: SHIPPED | OUTPATIENT
Start: 2022-03-22

## 2022-03-22 RX ORDER — AZELASTINE 1 MG/ML
2 SPRAY, METERED NASAL EVERY MORNING
Qty: 3 EACH | Refills: 3 | Status: SHIPPED | OUTPATIENT
Start: 2022-03-22 | End: 2023-03-20

## 2022-03-22 NOTE — TELEPHONE ENCOUNTER
Caller: Kenyetta Montemayor    Relationship to patient: Self    Best call back number: [phone]    Patient is needing: PATIENT CALLED AND ADVISED THAT SHE IS NOT FEELING BETTER AFTER HER VISIT ON 3/11/22.  DR. LANDEROS TOLD HER TO CALL IN IF IT WASN'T GETTING ANY BETTER.

## 2022-03-22 NOTE — TELEPHONE ENCOUNTER
Viewed with Kenyetta and sent in a Medrol Dosepak to help with her cough.  She has a long history of asthma and seasonal allergies.

## 2022-04-21 ENCOUNTER — TELEMEDICINE (OUTPATIENT)
Dept: FAMILY MEDICINE CLINIC | Facility: CLINIC | Age: 47
End: 2022-04-21

## 2022-04-21 VITALS — DIASTOLIC BLOOD PRESSURE: 74 MMHG | SYSTOLIC BLOOD PRESSURE: 114 MMHG

## 2022-04-21 DIAGNOSIS — K29.60 REFLUX GASTRITIS: ICD-10-CM

## 2022-04-21 DIAGNOSIS — R05.9 COUGH: Primary | ICD-10-CM

## 2022-04-21 PROCEDURE — 99213 OFFICE O/P EST LOW 20 MIN: CPT | Performed by: FAMILY MEDICINE

## 2022-04-21 NOTE — PROGRESS NOTES
Subjective   Kenyetta Montemayor is a 46 y.o. female.   Cough    History of Present Illness   You have chosen to receive care through a telehealth visit.  Do you consent to use a video/audio connection for your medical care today? Yes.     Kenyetta is having a video visit w/ c/o cough.  She has asthma and it is often difficult for her to tell the difference between her asthma cough and a sick cough.  She has finished a Zpak in March and it help and the cough was not better. She was given a steroid pack and did not that this help. She has been on MSM and that helped. She has wondered if she has acid reflex and her dentist noted at her last visit that she had enamel damage to the back of her teeth that was concerning for long term reflux. She is now concerned about GERD.   She has started taking OTC Nexium 20 mg a day.    The following portions of the patient's history were reviewed and updated as appropriate: allergies, current medications, past family history, past medical history, past social history, past surgical history and problem list.    Review of Systems   Constitutional: Negative for chills, fever and unexpected weight loss.   HENT: Positive for ear pain, postnasal drip and rhinorrhea. Negative for sore throat.    Respiratory: Positive for cough. Negative for shortness of breath and wheezing.    Cardiovascular: Negative for chest pain.   Musculoskeletal: Positive for myalgias.   Skin: Negative for rash.       Objective   Physical Exam   Constitutional: She appears well-developed and well-nourished.   HENT:   Head: Normocephalic and atraumatic.   Eyes: Pupils are equal, round, and reactive to light. Conjunctivae and EOM are normal.   Neck: Neck normal appearance.  Pulmonary/Chest: Effort normal.  No respiratory distress.  Abdominal: Abdomen appears normal.   Musculoskeletal: Normal range of motion.   Neurological: She is alert.   Skin: No rash noted.   Psychiatric: She has a normal mood and affect.   Vitals  reviewed.        Assessment/Plan   Problem List Items Addressed This Visit    None     Visit Diagnoses     Cough    -  Primary    Relevant Orders    COVID-19,[facility] ROUTINE, NP/OP SWAB IN TRANSPORT MEDIA OR ESWAB 72 HR TAT - Swab, Nasopharynx    Reflux gastritis        Relevant Orders    Ambulatory Referral to Gastroenterology      I have encouraged her increase Nexium to 40 mg a day for 2 weeks.          Return if symptoms worsen or fail to improve.

## 2022-04-22 LAB
LABCORP SARS-COV-2, NAA 2 DAY TAT: NORMAL
SARS-COV-2 RNA RESP QL NAA+PROBE: NOT DETECTED

## 2022-05-10 ENCOUNTER — TELEPHONE (OUTPATIENT)
Dept: GASTROENTEROLOGY | Facility: CLINIC | Age: 47
End: 2022-05-10

## 2022-05-10 ENCOUNTER — OFFICE VISIT (OUTPATIENT)
Dept: GASTROENTEROLOGY | Facility: CLINIC | Age: 47
End: 2022-05-10

## 2022-05-10 VITALS
HEART RATE: 87 BPM | HEIGHT: 62 IN | DIASTOLIC BLOOD PRESSURE: 81 MMHG | BODY MASS INDEX: 37.98 KG/M2 | WEIGHT: 206.4 LBS | TEMPERATURE: 97 F | SYSTOLIC BLOOD PRESSURE: 118 MMHG

## 2022-05-10 DIAGNOSIS — R10.13 EPIGASTRIC PAIN: ICD-10-CM

## 2022-05-10 DIAGNOSIS — Z12.11 SCREENING FOR COLON CANCER: ICD-10-CM

## 2022-05-10 DIAGNOSIS — R12 HEARTBURN: ICD-10-CM

## 2022-05-10 DIAGNOSIS — Z83.71 FAMILY HISTORY OF POLYPS IN THE COLON: ICD-10-CM

## 2022-05-10 DIAGNOSIS — R05.3 CHRONIC COUGH: Primary | ICD-10-CM

## 2022-05-10 PROBLEM — Z83.719 FAMILY HISTORY OF POLYPS IN THE COLON: Status: ACTIVE | Noted: 2022-05-10

## 2022-05-10 PROCEDURE — 99204 OFFICE O/P NEW MOD 45 MIN: CPT | Performed by: NURSE PRACTITIONER

## 2022-05-10 NOTE — TELEPHONE ENCOUNTER
MIKE patient in office for EGD/CS. Scheduled 08/11/2022 with arrival time 1:15pm. Prep packet handed to patient. Also advised arrival time may vary based on [facility] guidelines. MIKE Champion--Suni

## 2022-05-10 NOTE — PROGRESS NOTES
Chief Complaint   Patient presents with   • Heartburn   • Cough       HPI    Kenyetta Montemayor is a  46 y.o. female here establish care as a new patient for complaints of chronic cough, epigastric pain, and heartburn.    This patient will also follow with Dr. Chaudhry.    Patient reports longstanding history of intermittent chronic cough worse over the last several months reports occasional heartburn and epigastric discomfort described as a burning aching sensation that comes and goes.  Recently transition from once daily Nexium to twice daily dosing OTC Nexium.  She reports improvement in symptoms as such.  Denies nausea, vomiting, odynophagia, dysphagia.  Frequently gets globus sensation.  Symptoms can be worse at bedtime.  Sleeps with her head elevated.    Denies diarrhea, constipation, rectal bleeding.    She is coming due for screening colonoscopy and she will be 47 the 26th of this month.  She does report family history of colon polyps with her mother.    No recent labs.    Past Medical History:   Diagnosis Date   • Allergic     many seasonal/indoor   • Allergic rhinitis    • Anesthesia     VERY AGITATED WHEN WAKING UP, ALSO WOKE UP DURING REMOVAL OF WISDOM TEETH   • Anxiety    • Asthma 2002?   • Atypical squamous cells of undetermined significance (ASCUS) on Papanicolaou smear of cervix    • Depression 2015/2016    very sad after my mom was diagnosed with brain cancer   • Encounter for IUD removal    • GERD (gastroesophageal reflux disease)     sometimes   • H/O dysmenorrhea    • H/O infertility    • H/O seasonal allergies    • Hypertension 2020   • Ovarian cyst    • Pelvic pain in female    • PMS (premenstrual syndrome)    • Scoliosis     very mild       Past Surgical History:   Procedure Laterality Date   • D & C HYSTEROSCOPY ENDOMETRIAL ABLATION N/A 2/3/2020    Procedure: DILATATION AND CURETTAGE HYSTEROSCOPY NOVASURE ENDOMETRIAL ABLATION;  Surgeon: Onelia Tuttle MD;  Location: [facility];  Service:  Obstetrics/Gynecology;  Laterality: N/A;   • SEPTOPLASTY, RESECTION INFERIOR TURBINATES  02/2016   • WISDOM TOOTH EXTRACTION  1990       Scheduled Meds:     Continuous Infusions: No current facility-administered medications for this visit.      PRN Meds:     Allergies   Allergen Reactions   • Ceclor [Cefaclor] Other (See Comments)     CHILDHOOD ALLERGY       Social History     Socioeconomic History   • Marital status:      Spouse name: Dami   • Years of education: college   Tobacco Use   • Smoking status: Never Smoker   • Smokeless tobacco: Never Used   Vaping Use   • Vaping Use: Never used   Substance and Sexual Activity   • Alcohol use: Yes     Alcohol/week: 7.0 - 12.0 standard drinks     Types: 7 - 10 Drinks containing 0.5 oz of alcohol per week     Comment: 1-2/day   • Drug use: Never   • Sexual activity: Yes     Partners: Male     Birth control/protection: Surgical     Comment: 's Vasectomy       Family History   Problem Relation Age of Onset   • Hypertension Mother    • Breast cancer Mother 49   • Thyroid disease Mother    • Cancer Mother         breast, thyroid, brain   • Hyperlipidemia Mother    • Colon polyps Mother    • Prostate cancer Father    • Cancer Father         prostate, skin   • Breast cancer Paternal Aunt    • Diabetes Maternal Grandmother    • Hyperlipidemia Maternal Grandmother    • Hypertension Maternal Grandmother    • Colon cancer Other    • Arthritis Paternal Grandmother    • Stroke Paternal Grandmother         mini stroke   • Cancer Paternal Aunt         breast   • Depression Maternal Aunt         bipolar   • Heart disease Paternal Grandfather    • Hyperlipidemia Paternal Grandfather    • Hypertension Paternal Grandfather    • Mental illness Maternal Aunt         bipolar   • Depression Maternal Aunt         bipolar   • No Known Problems Sister    • No Known Problems Brother    • BRCA 1/2 Neg Hx    • Endometrial cancer Neg Hx    • Ovarian cancer Neg Hx    • Malig Hyperthermia  Neg Hx        Review of Systems   Constitutional: Negative for activity change, appetite change, fatigue and unexpected weight change.   HENT: Negative for trouble swallowing.    Eyes: Negative.    Respiratory: Positive for cough.    Cardiovascular: Negative.    Gastrointestinal: Positive for abdominal pain. Negative for abdominal distention, anal bleeding, blood in stool, constipation, diarrhea, nausea, rectal pain and vomiting.   Endocrine: Negative.    Genitourinary: Negative.    Musculoskeletal: Negative.    Allergic/Immunologic: Negative.    Neurological: Negative.    Hematological: Negative.    Psychiatric/Behavioral: Negative.        Vitals:    05/10/22 1334   BP: 118/81   Pulse: 87   Temp: 97 °F (36.1 °C)       Physical Exam  Constitutional:       Appearance: She is well-developed.   Abdominal:      General: Bowel sounds are normal. There is no distension.      Palpations: Abdomen is soft. There is no mass.      Tenderness: There is no abdominal tenderness. There is no guarding.      Hernia: No hernia is present.   Skin:     General: Skin is warm and dry.      Capillary Refill: Capillary refill takes less than 2 seconds.   Neurological:      Mental Status: She is alert and oriented to person, place, and time.   Psychiatric:         Behavior: Behavior normal.       Assessment    Diagnoses and all orders for this visit:    1. Chronic cough (Primary)  -     Case Request; Standing  -     Case Request  -     CBC & Differential  -     Comprehensive Metabolic Panel  -     Celiac Comprehensive Panel  -     Food Allergy Profile  -     TSH    2. Heartburn  -     Case Request; Standing  -     Case Request  -     CBC & Differential  -     Comprehensive Metabolic Panel  -     Celiac Comprehensive Panel  -     Food Allergy Profile  -     TSH    3. Epigastric pain  -     Case Request; Standing  -     Case Request  -     CBC & Differential  -     Comprehensive Metabolic Panel  -     Celiac Comprehensive Panel  -     Food  Allergy Profile  -     TSH    4. Family history of polyps in the colon  -     Case Request; Standing  -     Case Request    5. Screening for colon cancer  -     Case Request; Standing  -     Case Request    Other orders  -     esomeprazole (nexIUM) 20 MG capsule; Take 1 capsule by mouth 2 (Two) Times a Day.  Dispense: 180 capsule; Refill: 3       Plan    Arrange EGD and colonoscopy with Dr. Chaudhry  Continue Nexium 20 mg p.o. twice daily  Adhere to an antireflux diet  Antireflux measures and dietary modifications reviewed. Low acid diet reviewed. Keep head of bed elevated. Stop eating/drinking at least 3 hours prior to bedtime. Eliminate caffeine and carbonated beverages.  Weight loss encouraged if BMI over 25.  I will discussed the possibility of a Quigley study with Dr. Chaudhry further.  Labs today as above.  Follow-up and further recommendations pending aforementioned work-up.         CORBY Hi  [facility]  [address] [place] [address]  Office: [phone]

## 2022-05-11 ENCOUNTER — PREP FOR SURGERY (OUTPATIENT)
Dept: OTHER | Facility: HOSPITAL | Age: 47
End: 2022-05-11

## 2022-05-12 ENCOUNTER — TELEPHONE (OUTPATIENT)
Dept: GASTROENTEROLOGY | Facility: CLINIC | Age: 47
End: 2022-05-12

## 2022-05-12 RX ORDER — PANTOPRAZOLE SODIUM 20 MG/1
20 TABLET, DELAYED RELEASE ORAL 2 TIMES DAILY
Qty: 180 TABLET | Refills: 3 | Status: SHIPPED | OUTPATIENT
Start: 2022-05-12 | End: 2022-08-29 | Stop reason: DRUGHIGH

## 2022-05-12 NOTE — TELEPHONE ENCOUNTER
----- Message from CORBY Hi sent at 5/11/2022  4:08 PM EDT -----  Regarding: EGD modification  Can you let the patient know that we want to do Bravo testing with her EGD.  Case request has been modified.  Hold PPI therapy 2 weeks prior to Bravo testing she can use Pepcid if needed.  Thanks BG      ----- Message -----  From: Roland Chaudhry MD  Sent: 5/11/2022  10:58 AM EDT  To: CORBY Hi    Yes lets do it off PPI 2 weeks, she can use Pepcid if she needs to  ----- Message -----  From: Ara Tidwell APRN  Sent: 5/10/2022   2:12 PM EDT  To: Roland Chaudhry MD    New patient for chronic cough heartburn and epigastric pain.  I set her up for a double due for screening colonoscopy.  Bravo testing?

## 2022-05-13 ENCOUNTER — TELEPHONE (OUTPATIENT)
Dept: GASTROENTEROLOGY | Facility: CLINIC | Age: 47
End: 2022-05-13

## 2022-05-13 NOTE — TELEPHONE ENCOUNTER
Called pt and advised of note to stop taking PPI 2 weeks prior to Bravo.  Pt advised understanding.

## 2022-05-17 LAB
ALBUMIN SERPL-MCNC: 4.2 G/DL (ref 3.8–4.8)
ALBUMIN/GLOB SERPL: 1.6 {RATIO} (ref 1.2–2.2)
ALP SERPL-CCNC: 58 IU/L (ref 44–121)
ALT SERPL-CCNC: 15 IU/L (ref 0–32)
AST SERPL-CCNC: 15 IU/L (ref 0–40)
BASOPHILS # BLD AUTO: 0 X10E3/UL (ref 0–0.2)
BASOPHILS NFR BLD AUTO: 1 %
BILIRUB SERPL-MCNC: 0.3 MG/DL (ref 0–1.2)
BUN SERPL-MCNC: 12 MG/DL (ref 6–24)
BUN/CREAT SERPL: 17 (ref 9–23)
CALCIUM SERPL-MCNC: 9.4 MG/DL (ref 8.7–10.2)
CHLORIDE SERPL-SCNC: 98 MMOL/L (ref 96–106)
CO2 SERPL-SCNC: 24 MMOL/L (ref 20–29)
CREAT SERPL-MCNC: 0.69 MG/DL (ref 0.57–1)
EGFRCR SERPLBLD CKD-EPI 2021: 110 ML/MIN/1.73
ENDOMYSIUM IGA SER QL: NEGATIVE
EOSINOPHIL # BLD AUTO: 0.1 X10E3/UL (ref 0–0.4)
EOSINOPHIL NFR BLD AUTO: 1 %
ERYTHROCYTE [DISTWIDTH] IN BLOOD BY AUTOMATED COUNT: 12.9 % (ref 11.7–15.4)
GLIADIN PEPTIDE IGA SER-ACNC: 4 UNITS (ref 0–19)
GLIADIN PEPTIDE IGG SER-ACNC: 2 UNITS (ref 0–19)
GLOBULIN SER CALC-MCNC: 2.7 G/DL (ref 1.5–4.5)
GLUCOSE SERPL-MCNC: 87 MG/DL (ref 65–99)
HCT VFR BLD AUTO: 39.5 % (ref 34–46.6)
HGB BLD-MCNC: 13.2 G/DL (ref 11.1–15.9)
IGA SERPL-MCNC: 259 MG/DL (ref 87–352)
IMM GRANULOCYTES # BLD AUTO: 0 X10E3/UL (ref 0–0.1)
IMM GRANULOCYTES NFR BLD AUTO: 0 %
LYMPHOCYTES # BLD AUTO: 3 X10E3/UL (ref 0.7–3.1)
LYMPHOCYTES NFR BLD AUTO: 36 %
MCH RBC QN AUTO: 29.7 PG (ref 26.6–33)
MCHC RBC AUTO-ENTMCNC: 33.4 G/DL (ref 31.5–35.7)
MCV RBC AUTO: 89 FL (ref 79–97)
MONOCYTES # BLD AUTO: 0.7 X10E3/UL (ref 0.1–0.9)
MONOCYTES NFR BLD AUTO: 8 %
NEUTROPHILS # BLD AUTO: 4.6 X10E3/UL (ref 1.4–7)
NEUTROPHILS NFR BLD AUTO: 54 %
PLATELET # BLD AUTO: 334 X10E3/UL (ref 150–450)
POTASSIUM SERPL-SCNC: 4.3 MMOL/L (ref 3.5–5.2)
PROT SERPL-MCNC: 6.9 G/DL (ref 6–8.5)
RBC # BLD AUTO: 4.44 X10E6/UL (ref 3.77–5.28)
SODIUM SERPL-SCNC: 138 MMOL/L (ref 134–144)
TSH SERPL DL<=0.005 MIU/L-ACNC: 2.07 UIU/ML (ref 0.45–4.5)
TTG IGA SER-ACNC: <2 U/ML (ref 0–3)
TTG IGG SER-ACNC: <2 U/ML (ref 0–5)
WBC # BLD AUTO: 8.4 X10E3/UL (ref 3.4–10.8)

## 2022-05-18 NOTE — PROGRESS NOTES
Subjective   Kenyetta Montemayor is a 42 y.o. female.     History of Present Illness   Kenyetta is here today for cough and chest congestion that has been going on for about 6 weeks. She denies any fever and taking any medication other than things for her allergies. She is also c/o not being able to breath well while walking to [place].     The following portions of the patient's history were reviewed and updated as appropriate: allergies, current medications, past medical history, past social history and problem list.    Review of Systems   All other systems reviewed and are negative.      Objective   Physical Exam   Constitutional: She is oriented to person, place, and time. She appears well-developed and well-nourished. No distress.   HENT:   Head: Normocephalic and atraumatic.   Mouth/Throat: No oropharyngeal exudate.   Eyes: Conjunctivae and EOM are normal. Pupils are equal, round, and reactive to light.   Neck: Normal range of motion.   Cardiovascular: Normal rate and regular rhythm.    Pulmonary/Chest: Effort normal and breath sounds normal. No stridor. No respiratory distress. She has no wheezes.   Lymphadenopathy:     She has no cervical adenopathy.   Neurological: She is alert and oriented to person, place, and time.   Skin: Skin is warm and dry. No rash noted.   Nursing note and vitals reviewed.      Assessment/Plan   Kenyetta was seen today for cough and uri.    Diagnoses and all orders for this visit:    Acute URI  Mild intermittent asthma, unspecified whether complicated  -     azithromycin (ZITHROMAX Z-JOSE) 250 MG tablet; Take 2 tablets the first day, then 1 tablet daily for 4 days.    Patient Instructions   I have started you on a Zpak and continue with inhalers. Please call if not improving in a week.              Surgical Oncology Follow Up  [facility]  [facility] [place] [id number]    Yoni Ellis  1945  [id number]      Chief Complaint   Patient presents with    Post-op     Possible seroma         Assessment & Plan: For follow-up with a triple negative right breast cancer status post breast conservation surgery and sentinel lymph node biopsy pathology was discussed and copy of the report was given to the patient  She is supposed to follow-up with medical oncologist as well as radiation oncologist   Plan for adjuvant chemotherapy and radiation  This was emphasized and she will follow with them  She also has a history of pancreatic head mass   And being followed clinically as well as images  Her next CT chest abdomen pelvis is scheduled for November 2022  Her chromogranin a has been elevated and and supposed to have a we repeated in December 2021 which was not done as patient is states that she missed it therefore we will obtain a chromogranin a level and with a CT chest abdomen pelvis  She she has severe COPD and I emphasized that she should talk to her pulmonologist and tinc of being on home oxygen as well  I will follow her in 6 months time  Cancer History:     Oncology History Overview Note   2/15/22 screening B/L mammogram  RIGHT  A) MASS: There is a high density, irregularly shaped mass seen in the right breast at 11 o'clock in the anterior depth  Left  There are no suspicious masses, grouped microcalcifications or areas of unexplained architectural distortion  The skin and nipple areolar complex are unremarkable  RECOMMENDATION:       - Diagnostic mammogram and ultrasound at the current time for the right breast     3/17/22 Diagnostic right mammogram and right breast US  RIGHT  A) MASS  Mammo diagnostic right w 3d & cad:  There is a 14 mm x 10 mm x 9 mm high density, irregularly shaped mass seen in the right breast at 12 o'clock, 5 cm from the nipple, anterior depth  This corresponds with the mass seen on screening mammogram and is new compared to the mammogram from 2019  US breast right limited (diagnostic): At 12 o'clock, 5 cm from nipple there is an irregular hypoechoic solid mass which measures 19 x 16 x 10 mm (see the oblique images at the end of the ultrasound study)  The mass correlates with the mass seen on the mammogram   This is highly suggestive of malignancy  Appropriate action should be taken  Ultrasound-guided core needle biopsy is recommended  B) MASS  Mammo diagnostic right w 3d & cad: There is a 5 mm oval mass with circumscribed margins seen in the upper outer quadrant of the right breast in the anterior depth  This is a new finding  US breast right limited (diagnostic): There is a 5 mm x 4 mm x 2 mm oval, parallel, anechoic mass with circumscribed margins in the right breast at 10 o'clock, 5 cm from the nipple  The cyst correlates with the mass seen on the mammogram and is benign in appearance  Axilla: Targeted ultrasound of the right axilla was performed  At least 1 morphologically benign lymph node was visualized  No morphologically abnormal lymph nodes were visualized  RECOMMENDATION:       - Ultrasound-guided breast biopsy for the right breast      Malignant neoplasm of overlapping sites of right breast in female, estrogen receptor negative ([address] )   3/25/2022 Initial Diagnosis    Malignant neoplasm of right female breast ([address] )     3/25/2022 Biopsy    Right breast ultrasound guided biopsy  12 o'clock 5 cm from nipple  Invasive breast carcinoma of no special type (ductal NST/ invasive ductal carcinoma with apocrine features)  Grade 2  ER 0  VT 0  HER 2 2+   FISH negative  Lymphovascular invasion not identified    Concordant  Malignancy is unifocal  Mass measures 1 4 cm on mammogram and 1 9 cm on ultrasound  Right axilla ultrasound clear  Left brest clear  Amanda  reflector placed   In situ compononent: favor small component of intermediate grade DCIS with apocrine features  4/4/2022 -  Cancer Staged    Staging form: Breast, AJCC 8th Edition  - Clinical stage from 4/4/2022: Stage IB (cT1c, cN0, cM0, G2, ER-, CT-, HER2-) - Signed by Manuel Kim MD on 4/4/2022  Stage prefix: Initial diagnosis  Nuclear grade: G2  Histologic grading system: 3 grade system  HER2-IHC interpretation: Equivocal  HER2-IHC value: Score 2+  HER2-FISH interpretation: Negative       4/4/2022 Genetic Testing    [facility]  A total of 36 genes were evaluated, including: GILL, BRCA1, BRCA2, CDH1, CHEK2, PALB2, PTEN, STK11, TP53  Negative result  No pathogenic sequence variants or deletions/duplications identified     4/28/2022 Surgery    Right breast lexy  directed lumpectomy with sentinel lymph node biopsy and axillary dissection  Invasive ductal carcinoma with apocrine features  Grade 3  1 4 cm  0/4 Lymph nodes             Interval History:   Follow-up with right breast cancer and pancreatic head mass  Review of Systems:   Review of Systems   Constitutional: Negative for chills and fever  HENT: Negative for ear pain and sore throat  Eyes: Negative for pain and visual disturbance  Respiratory: Negative for cough and shortness of breath  Cardiovascular: Negative for chest pain and palpitations  Gastrointestinal: Negative for abdominal pain and vomiting  Genitourinary: Negative for dysuria and hematuria  Musculoskeletal: Negative for arthralgias and back pain  Skin: Negative for color change and rash  Neurological: Negative for seizures and syncope  All other systems reviewed and are negative        Past Medical History     Patient Active Problem List   Diagnosis    Anxiety    [facility]ac occlusive disease [facility]    Carotid artery plaque, bilateral    Centrilobular emphysema ([address] )    Hyperlipidemia    Osteoporosis    Restless leg syndrome    Subclavian artery stenosis, left ([facility] )    PVD (peripheral vascular disease) (HCC)    Chronic sinusitis    Pancreatic mass    BMI 34 0-34 9,adult    Seborrheic keratoses    Stage 3 chronic kidney disease (HCC)    Closed avulsion fracture of lateral malleolus of right fibula    Prediabetes    Vitamin D deficiency    Acute right ankle pain    COPD (chronic obstructive pulmonary disease) (HCC)    Malignant neoplasm of overlapping sites of right breast in female, estrogen receptor negative ([address] )    Continuous opioid dependence ([facility] )     Past Medical History:   Diagnosis Date    Anxiety     Atherosclerosis of native artery of both lower extremities with intermittent claudication ([address] ) 10/15/2015    Transitioned From: Cardiovascular Symptoms    Breast cancer ([address] )     Carotid artery plaque, bilateral 2017    Transitioned From: Atherosclerosis of both carotid arteries    Chronic kidney disease     Chronic sinusitis     Last assessed: 13    COPD (chronic obstructive pulmonary disease) ([facility] )     COVID     21 not hopsitalized- flu-like symptoms    Fall 2021    Fracture, ankle closed, bimalleolar, right, initial encounter 2021    GERD (gastroesophageal reflux disease)     Hyperlipidemia     Lung nodule     PNA (pneumonia)     PONV (postoperative nausea and vomiting)     with C-sections    Pulmonary emphysema ([facility]     PVD (peripheral vascular disease) ([facility] )     Restless leg syndrome     Stage 3 chronic kidney disease ([facility] ) 2019    Tobacco abuse      Past Surgical History:   Procedure Laterality Date    BREAST LUMPECTOMY Right 2022    Procedure: ISAI  DIRECTED LUMPECTOMY;  Surgeon: ERIKA Pereira MD;  [facility]: [facility];  Service: Surgical Oncology    CATARACT EXTRACTION       SECTION      COLONOSCOPY      Complete   Resolved: 13    DESCENDING AORTIC ANEURYSM REPAIR W/ STENT  2019    IR AORTAGRAM WITH RUN-OFF  8/15/2019    LYMPH NODE BIOPSY Right 2022 Procedure: LYMPHATIC MAPPING WITH BLUE AND RADIOACTIVE DYES, SENTINEL LYMPH NODE BIOPSY, INJECTION IN OR BY DR RODRÍGUEZ AT 1300;  Surgeon: Jorge Alberto Guerra MD;  Location: [facility];  Service: Surgical Oncology    SHOULDER SURGERY      For frozen shoulder     TONSILLECTOMY      US BREAST ISAI  NEEDLE LOC RIGHT Right 3/25/2022    US GUIDED BREAST BIOPSY RIGHT COMPLETE Right 3/25/2022     Family History   Problem Relation Age of Onset    No Known Problems Mother     No Known Problems Father     Arthritis Sister     Pancreatic cancer Sister 61    Melanoma Brother         twice    Prostate cancer Brother     Heart attack Family         Acute Myocardial Infarction    Cirrhosis Family     Prostate cancer Family     Skin cancer Family     Stroke Family         Syndrome    No Known Problems Daughter     No Known Problems Maternal Grandmother     No Known Problems Paternal Grandmother     No Known Problems Sister     No Known Problems Maternal Aunt     Breast cancer Other 27     Social History     Socioeconomic History    Marital status:       Spouse name: Not on file    Number of children: 2    Years of education: Not on file    Highest education level: Not on file   Occupational History    Occupation: Retired/   Tobacco Use    Smoking status: Former Smoker     Packs/day: 2 00     Years: 56 00     Pack years: 112 00     Types: Cigarettes     Start date: 2/21/1962     Quit date: 5/24/2018     Years since quitting: 3 9    Smokeless tobacco: Never Used   Vaping Use    Vaping Use: Never used   Substance and Sexual Activity    Alcohol use: Yes     Comment: occasionally     Drug use: No    Sexual activity: Not Currently     Partners: Male   Other Topics Concern    Not on file   Social History Narrative    Living w/adult children    No advance directive on file     Social Determinants of Health     Financial Resource Strain: Not on file   Food Insecurity: Not on file Transportation Needs: Not on file   Physical Activity: Inactive    Days of Exercise per Week: 0 days    Minutes of Exercise per Session: 0 min   Stress: Stress Concern Present    Feeling of Stress : Very much   Social Connections: Not on file   Intimate Partner Violence: Not on file   Housing Stability: Not on file       Current Outpatient Medications:     albuterol (2 5 mg/3 mL) 0 083 % nebulizer solution, Take 1 vial (2 5 mg total) by nebulization every 6 (six) hours as needed for wheezing or shortness of breath, Disp: 360 mL, Rfl: 3    albuterol (PROVENTIL HFA,VENTOLIN HFA) 90 mcg/act inhaler, Inhale 2 puffs every 6 (six) hours as needed for wheezing, Disp: 3 Inhaler, Rfl: 3    ALPRAZolam (XANAX) 0 5 mg tablet, Take 1 tablet (0 5 mg total) by mouth 2 (two) times a day as needed for anxiety, Disp: 60 tablet, Rfl: 0    ASPIRIN 81 PO, Take by mouth, Disp: , Rfl:     ergocalciferol (VITAMIN D2) 50,000 units, take 1 capsule by mouth every week, Disp: 12 capsule, Rfl: 0    fluticasone (FLONASE) 50 mcg/act nasal spray, 2 sprays into each nostril daily, Disp: 11 1 mL, Rfl: 1    fluticasone-vilanterol (Breo Ellipta) 100-25 mcg/inh inhaler, Inhale 1 puff daily Rinse mouth after use , Disp: 180 blister, Rfl: 1    gabapentin (NEURONTIN) 300 mg capsule, take 1 capsule by mouth twice a day, Disp: 180 capsule, Rfl: 1    gabapentin (NEURONTIN) 400 mg capsule, Take 1 capsule (400 mg total) by mouth daily at bedtime, Disp: 90 capsule, Rfl: 1    naloxone (NARCAN) 4 mg/0 1 mL nasal spray, Administer 1 spray into a nostril   If no response after 2-3 minutes, give another dose in the other nostril using a new spray , Disp: 1 each, Rfl: 1    olopatadine (PATANOL) 0 1 % ophthalmic solution,  , Disp: , Rfl: 0    oxyCODONE-acetaminophen (Percocet) 5-325 mg per tablet, Take 1 tablet by mouth every 6 (six) hours as needed for moderate pain Max Daily Amount: 4 tablets, Disp: 20 tablet, Rfl: 0    pantoprazole (PROTONIX) 40 mg tablet, take 1 tablet by mouth daily before breakfast, Disp: 30 tablet, Rfl: 5    rosuvastatin (CRESTOR) 20 MG tablet, Take 1 tablet (20 mg total) by mouth daily, Disp: 90 tablet, Rfl: 6    sodium chloride () 2 % hypertonic ophthalmic solution, Sue 128 2 % eye drops, Disp: , Rfl:   Allergies   Allergen Reactions    Spiriva Handihaler [Tiotropium Bromide Monohydrate] Shortness Of Breath    Augmentin [Amoxicillin-Pot Clavulanate] Abdominal Pain     Pt received ceftriaxone 1 g IV on 5-21-18, gets sharp pains     Tiotropium Abdominal Pain    Tylenol With Codeine #3 [Acetaminophen-Codeine] Abdominal Pain       Physical Exam:     Vitals:    05/18/22 1337   BP: 110/70   Pulse: 100   Resp: 14   SpO2: 94%     Physical Exam  Constitutional:       Appearance: Normal appearance  HENT:      Head: Normocephalic and atraumatic  Nose: Nose normal       Mouth/Throat:      Mouth: Mucous membranes are moist    Eyes:      Pupils: Pupils are equal, round, and reactive to light  Cardiovascular:      Rate and Rhythm: Normal rate  Pulses: Normal pulses  Heart sounds: Normal heart sounds  Pulmonary:      Effort: Pulmonary effort is normal       Breath sounds: Normal breath sounds  Abdominal:      General: Bowel sounds are normal       Palpations: Abdomen is soft  Musculoskeletal:         General: Normal range of motion  Cervical back: Normal range of motion and neck supple  Skin:     General: Skin is warm  Neurological:      General: No focal deficit present  Mental Status: She is alert and oriented to person, place, and time  Psychiatric:         Mood and Affect: Mood normal          Behavior: Behavior normal          Thought Content: Thought content normal          Judgment: Judgment normal            Results & Discussion:   I did review pathology in detail and copy of the report was given to the patient    We will refer her to Medical and Radiation oncologist and we will see her in 6 she understands and  agrees   All patient questions were answered  Advance Care Planning/Advance Directives: Alanna Sunshine MD discussed the disease status with Delbert Diaz  today 05/18/22  treatment plans and follow-up with the patient

## 2022-05-23 LAB
CLAM IGE QN: <0.1 KU/L
CODFISH IGE QN: <0.1 KU/L
CONV CLASS DESCRIPTION: NORMAL
CORN IGE QN: <0.1 KU/L
COW MILK IGE QN: <0.1 KU/L
EGG WHITE IGE QN: <0.1 KU/L
PEANUT IGE QN: <0.1 KU/L
SCALLOP IGE QN: <0.1 KU/L
SESAME SEED IGE QN: <0.1 KU/L
SHRIMP IGE QN: <0.1 KU/L
SOYBEAN IGE QN: <0.1 KU/L
WALNUT IGE QN: <0.1 KU/L
WHEAT IGE QN: <0.1 KU/L

## 2022-05-25 ENCOUNTER — TELEPHONE (OUTPATIENT)
Dept: GASTROENTEROLOGY | Facility: CLINIC | Age: 47
End: 2022-05-25

## 2022-05-25 NOTE — TELEPHONE ENCOUNTER
----- Message from CORBY Hi sent at 5/23/2022  4:17 PM EDT -----  Let the patient know labs are normal

## 2022-06-09 ENCOUNTER — CLINICAL SUPPORT (OUTPATIENT)
Dept: FAMILY MEDICINE CLINIC | Facility: CLINIC | Age: 47
End: 2022-06-09

## 2022-06-09 DIAGNOSIS — Z20.822 CLOSE EXPOSURE TO COVID-19 VIRUS: Primary | ICD-10-CM

## 2022-06-11 ENCOUNTER — TELEPHONE (OUTPATIENT)
Dept: FAMILY MEDICINE CLINIC | Facility: CLINIC | Age: 47
End: 2022-06-11

## 2022-06-11 LAB
LABCORP SARS-COV-2, NAA 2 DAY TAT: NORMAL
SARS-COV-2 RNA RESP QL NAA+PROBE: DETECTED

## 2022-06-11 RX ORDER — MULTIPLE VITAMINS W/ MINERALS TAB 9MG-400MCG
1 TAB ORAL DAILY
COMMUNITY

## 2022-06-11 NOTE — TELEPHONE ENCOUNTER
Kenyetta called on-call on Saturday June 11, 2022 at 10:45 AM with concerns about recent COVID positive test.  She has chronic asthma and is on multiple medications.  She also has chronic anxiety depression and is on Prozac.  She is requesting Paxlovid for treatment.  I have reviewed her medication list and she has decided that these medicines are not appropriate for her to stop while she is having the symptoms and so she has agreed that this is not the right thing for her to take.  I have encouraged her to watch her pulse oximetry and if her resting oxygen level gets  to 90 or below, I have encouraged her to go to the emergency room.  In the meantime I have advised her to take all of her medications as prescribed and to alert me if she gets worse.  She has agreed to this plan.

## 2022-07-22 DIAGNOSIS — I10 ESSENTIAL HYPERTENSION: Primary | ICD-10-CM

## 2022-07-22 DIAGNOSIS — R53.83 FATIGUE, UNSPECIFIED TYPE: ICD-10-CM

## 2022-07-22 DIAGNOSIS — E78.00 ELEVATED CHOLESTEROL: ICD-10-CM

## 2022-07-23 LAB
ALBUMIN SERPL-MCNC: 4.5 G/DL (ref 3.8–4.8)
ALBUMIN/GLOB SERPL: 1.6 {RATIO} (ref 1.2–2.2)
ALP SERPL-CCNC: 61 IU/L (ref 44–121)
ALT SERPL-CCNC: 22 IU/L (ref 0–32)
AST SERPL-CCNC: 21 IU/L (ref 0–40)
BASOPHILS # BLD AUTO: 0 X10E3/UL (ref 0–0.2)
BASOPHILS NFR BLD AUTO: 0 %
BILIRUB SERPL-MCNC: 0.4 MG/DL (ref 0–1.2)
BUN SERPL-MCNC: 11 MG/DL (ref 6–24)
BUN/CREAT SERPL: 15 (ref 9–23)
CALCIUM SERPL-MCNC: 9.5 MG/DL (ref 8.7–10.2)
CHLORIDE SERPL-SCNC: 103 MMOL/L (ref 96–106)
CHOLEST SERPL-MCNC: 244 MG/DL (ref 100–199)
CHOLEST/HDLC SERPL: 4.1 RATIO (ref 0–4.4)
CO2 SERPL-SCNC: 23 MMOL/L (ref 20–29)
CREAT SERPL-MCNC: 0.73 MG/DL (ref 0.57–1)
EGFRCR SERPLBLD CKD-EPI 2021: 103 ML/MIN/1.73
EOSINOPHIL # BLD AUTO: 0.1 X10E3/UL (ref 0–0.4)
EOSINOPHIL NFR BLD AUTO: 1 %
ERYTHROCYTE [DISTWIDTH] IN BLOOD BY AUTOMATED COUNT: 13 % (ref 11.7–15.4)
GLOBULIN SER CALC-MCNC: 2.9 G/DL (ref 1.5–4.5)
GLUCOSE SERPL-MCNC: 99 MG/DL (ref 65–99)
HCT VFR BLD AUTO: 41.8 % (ref 34–46.6)
HDLC SERPL-MCNC: 59 MG/DL
HGB BLD-MCNC: 13.8 G/DL (ref 11.1–15.9)
IMM GRANULOCYTES # BLD AUTO: 0 X10E3/UL (ref 0–0.1)
IMM GRANULOCYTES NFR BLD AUTO: 0 %
LDLC SERPL CALC-MCNC: 155 MG/DL (ref 0–99)
LYMPHOCYTES # BLD AUTO: 2.8 X10E3/UL (ref 0.7–3.1)
LYMPHOCYTES NFR BLD AUTO: 32 %
MCH RBC QN AUTO: 29.8 PG (ref 26.6–33)
MCHC RBC AUTO-ENTMCNC: 33 G/DL (ref 31.5–35.7)
MCV RBC AUTO: 90 FL (ref 79–97)
MONOCYTES # BLD AUTO: 0.5 X10E3/UL (ref 0.1–0.9)
MONOCYTES NFR BLD AUTO: 6 %
NEUTROPHILS # BLD AUTO: 5.3 X10E3/UL (ref 1.4–7)
NEUTROPHILS NFR BLD AUTO: 61 %
PLATELET # BLD AUTO: 332 X10E3/UL (ref 150–450)
POTASSIUM SERPL-SCNC: 4.6 MMOL/L (ref 3.5–5.2)
PROT SERPL-MCNC: 7.4 G/DL (ref 6–8.5)
RBC # BLD AUTO: 4.63 X10E6/UL (ref 3.77–5.28)
SODIUM SERPL-SCNC: 139 MMOL/L (ref 134–144)
TRIGL SERPL-MCNC: 166 MG/DL (ref 0–149)
VLDLC SERPL CALC-MCNC: 30 MG/DL (ref 5–40)
WBC # BLD AUTO: 8.8 X10E3/UL (ref 3.4–10.8)

## 2022-07-26 ENCOUNTER — TELEPHONE (OUTPATIENT)
Dept: OBSTETRICS AND GYNECOLOGY | Age: 47
End: 2022-07-26

## 2022-07-26 ENCOUNTER — OFFICE VISIT (OUTPATIENT)
Dept: FAMILY MEDICINE CLINIC | Facility: CLINIC | Age: 47
End: 2022-07-26

## 2022-07-26 VITALS
OXYGEN SATURATION: 98 % | DIASTOLIC BLOOD PRESSURE: 80 MMHG | WEIGHT: 206 LBS | HEART RATE: 74 BPM | RESPIRATION RATE: 16 BRPM | SYSTOLIC BLOOD PRESSURE: 130 MMHG | BODY MASS INDEX: 37.91 KG/M2 | HEIGHT: 62 IN

## 2022-07-26 DIAGNOSIS — Z00.00 ROUTINE HEALTH MAINTENANCE: Primary | ICD-10-CM

## 2022-07-26 DIAGNOSIS — E78.2 MIXED HYPERLIPIDEMIA: ICD-10-CM

## 2022-07-26 DIAGNOSIS — I10 ESSENTIAL HYPERTENSION, BENIGN: ICD-10-CM

## 2022-07-26 DIAGNOSIS — J45.20 MILD INTERMITTENT ASTHMA, UNSPECIFIED WHETHER COMPLICATED: ICD-10-CM

## 2022-07-26 PROCEDURE — 99213 OFFICE O/P EST LOW 20 MIN: CPT | Performed by: FAMILY MEDICINE

## 2022-07-26 PROCEDURE — 99396 PREV VISIT EST AGE 40-64: CPT | Performed by: FAMILY MEDICINE

## 2022-07-26 RX ORDER — DIAZEPAM 5 MG/1
5 TABLET ORAL AS NEEDED
COMMUNITY
End: 2022-09-21

## 2022-07-26 NOTE — PROGRESS NOTES
"Preventive Exam    History of Present Illness: Kenyetta is here for check up and review of routine health maintenance. She states she is doing well and we addressed the following chronic conditions:    Kenyetta has chronic hypertension and has been well controlled on current medications.She  is monitored by me every 6 months and is here today for follow up. She is tolerating medications without side effect. She reports no vision changes, headaches or lightheadedness. She is requesting refills of medications.    She has chronic asthma that is often allergy induced.  She is taking medications as prescribed and it seems to be helping her.    She has elevated cholesterol and is ready to start working on a healthier diet and some exercise to see if she can get her cholesterol into a better place.    Pap Smear:11/11/19  Mammogram:12/2/21  Colon cancer screening:Upcoming 8/11/22  STI screening:NI  Tobacco use :Never  Bone Density:NI      REVIEW OF SYSTEMS  Constitutional: Negative.    HENT: Negative.    Eyes: Negative.    Respiratory: Negative.    Cardiovascular: Negative.    Gastrointestinal: Negative.    Endocrine: Negative.    Genitourinary: Negative.    Musculoskeletal: Negative.  Skin: Negative.    Allergic/Immunologic: Negative.    Neurological: Negative.    Hematological: Negative.    Psychiatric/Behavioral: Negative.    I have reviewed the ROS as documented by the MA. Jorge Luis White MD       PHYSICAL EXAM    Vitals:    07/26/22 1315 07/26/22 2132   BP: 148/90 130/80   Pulse: 74    Resp: 16    SpO2: 98%    Weight: 93.4 kg (206 lb)    Height: 157.5 cm (62\")      GENERAL: alert and oriented, afebrile and vital signs stable  HEENT: oral mucosa moist, PEERLA, EOM, conjunctiva normal  No cervical adenopathy  LUNGS: clear to ascultation bilaterally, no rales, ronchi or wheezing  HEART: RRR S1 S2 without murmers, thrills, rubs or gallops  CHEST WALL: within normal limits, no tenderness  ABDOMEN: WNL. Normal " BS.  EXTREMITIES: No clubbing, cyanosis or edema noted. Normal Pulses.  SKIN: warm, dry, no rashes noted  NEURO: CN II- XII grossly intact  PSYCH: Good mood and positive affecrt  ASSESSMENT AND PLAN  Problem List Items Addressed This Visit        Cardiac and Vasculature    Essential hypertension, benign    Overview     Well controlled on current medication,lisinopril 20 mg, will refill medication today and as needed. She will RTO for repeat B/P check in 6 months.  She is aware that she has an element of whitecoat hypertension and often has elevated blood pressure in the office.  Her blood pressure runs normal on her home cuff.             Mixed hyperlipidemia    Overview     I have encouraged her to work on diet and exercise and this is her plan and we will follow-up in 6 months.              Pulmonary and Pneumonias    Asthma    Overview     She has chronic allergy induced asthma and is managing well on Singulair, Astelin, Pulmicort and her rescue inhaler             Other Visit Diagnoses     Routine health maintenance    -  Primary        Routine health maintenance reviewed and discussed with Kenyetta.  The patient was counseled regarding a healthy diet and exercise, appropriate routine screening and immunizations and encouraged to get regular dental and eye exams .  Labs reviewed and on the chart.  Return in about 6 months (around 1/26/2023) for Recheck.

## 2022-07-26 NOTE — PATIENT INSTRUCTIONS
Annual Wellness  Personal Prevention Plan of Service     Date of Office Visit:    Encounter Provider:  Jorge Luis White MD  Place of Service:  [facility]  Patient Name: Kenyetta Montemayor  :  1975    As part of the Annual Wellness portion of your visit today, we are providing you with this personalized preventive plan of services (PPPS). This plan is based upon recommendations of the [place] Preventive Services Task Force (USPSTF) and the Advisory Committee on Immunization Practices (ACIP).    This lists the preventive care services that should be considered, and provides dates of when you are due. Items listed as completed are up-to-date and do not require any further intervention.    Health Maintenance   Topic Date Due    COLORECTAL CANCER SCREENING  2022 (Originally 1975)    Pneumococcal Vaccine 0-64 (2 - PCV) 08/15/2022 (Originally 2022)    INFLUENZA VACCINE  10/01/2022    PAP SMEAR  2022    LIPID PANEL  2023    ANNUAL PHYSICAL  2023    TDAP/TD VACCINES (3 - Td or Tdap) 2031    HEPATITIS C SCREENING  Completed    COVID-19 Vaccine  Completed       No orders of the defined types were placed in this encounter.      Return in about 6 months (around 2023).

## 2022-08-03 ENCOUNTER — TRANSCRIBE ORDERS (OUTPATIENT)
Dept: ADMINISTRATIVE | Facility: HOSPITAL | Age: 47
End: 2022-08-03

## 2022-08-03 DIAGNOSIS — Z01.818 OTHER SPECIFIED PRE-OPERATIVE EXAMINATION: Primary | ICD-10-CM

## 2022-08-09 ENCOUNTER — LAB (OUTPATIENT)
Dept: LAB | Facility: HOSPITAL | Age: 47
End: 2022-08-09

## 2022-08-09 DIAGNOSIS — Z01.818 OTHER SPECIFIED PRE-OPERATIVE EXAMINATION: ICD-10-CM

## 2022-08-09 LAB — SARS-COV-2 ORF1AB RESP QL NAA+PROBE: NOT DETECTED

## 2022-08-09 PROCEDURE — C9803 HOPD COVID-19 SPEC COLLECT: HCPCS

## 2022-08-09 PROCEDURE — U0004 COV-19 TEST NON-CDC HGH THRU: HCPCS

## 2022-08-11 ENCOUNTER — ANESTHESIA EVENT (OUTPATIENT)
Dept: GASTROENTEROLOGY | Facility: HOSPITAL | Age: 47
End: 2022-08-11

## 2022-08-11 ENCOUNTER — ANESTHESIA (OUTPATIENT)
Dept: GASTROENTEROLOGY | Facility: HOSPITAL | Age: 47
End: 2022-08-11

## 2022-08-11 ENCOUNTER — HOSPITAL ENCOUNTER (OUTPATIENT)
Facility: HOSPITAL | Age: 47
Setting detail: HOSPITAL OUTPATIENT SURGERY
Discharge: HOME | End: 2022-08-11
Attending: INTERNAL MEDICINE | Admitting: INTERNAL MEDICINE

## 2022-08-11 VITALS
SYSTOLIC BLOOD PRESSURE: 128 MMHG | RESPIRATION RATE: 16 BRPM | HEIGHT: 62 IN | BODY MASS INDEX: 37.43 KG/M2 | OXYGEN SATURATION: 99 % | DIASTOLIC BLOOD PRESSURE: 84 MMHG | HEART RATE: 80 BPM | TEMPERATURE: 97.3 F | WEIGHT: 203.4 LBS

## 2022-08-11 DIAGNOSIS — R12 HEARTBURN: ICD-10-CM

## 2022-08-11 DIAGNOSIS — Z12.11 SCREENING FOR COLON CANCER: ICD-10-CM

## 2022-08-11 DIAGNOSIS — Z83.71 FAMILY HISTORY OF POLYPS IN THE COLON: ICD-10-CM

## 2022-08-11 DIAGNOSIS — R10.13 EPIGASTRIC PAIN: ICD-10-CM

## 2022-08-11 DIAGNOSIS — R05.3 CHRONIC COUGH: ICD-10-CM

## 2022-08-11 LAB
B-HCG UR QL: NEGATIVE
EXPIRATION DATE: NORMAL
INTERNAL NEGATIVE CONTROL: NEGATIVE
INTERNAL POSITIVE CONTROL: POSITIVE
Lab: NORMAL

## 2022-08-11 PROCEDURE — 45378 DIAGNOSTIC COLONOSCOPY: CPT | Performed by: INTERNAL MEDICINE

## 2022-08-11 PROCEDURE — 81025 URINE PREGNANCY TEST: CPT | Performed by: INTERNAL MEDICINE

## 2022-08-11 PROCEDURE — C1889 IMPLANT/INSERT DEVICE, NOC: HCPCS | Performed by: INTERNAL MEDICINE

## 2022-08-11 PROCEDURE — 43235 EGD DIAGNOSTIC BRUSH WASH: CPT | Performed by: INTERNAL MEDICINE

## 2022-08-11 PROCEDURE — S0260 H&P FOR SURGERY: HCPCS | Performed by: INTERNAL MEDICINE

## 2022-08-11 PROCEDURE — 25010000002 PROPOFOL 10 MG/ML EMULSION: Performed by: ANESTHESIOLOGY

## 2022-08-11 RX ORDER — PROPOFOL 10 MG/ML
VIAL (ML) INTRAVENOUS AS NEEDED
Status: DISCONTINUED | OUTPATIENT
Start: 2022-08-11 | End: 2022-08-11 | Stop reason: SURG

## 2022-08-11 RX ORDER — SODIUM CHLORIDE 0.9 % (FLUSH) 0.9 %
10 SYRINGE (ML) INJECTION AS NEEDED
Status: DISCONTINUED | OUTPATIENT
Start: 2022-08-11 | End: 2022-08-11 | Stop reason: HOSPADM

## 2022-08-11 RX ORDER — SODIUM CHLORIDE 0.9 % (FLUSH) 0.9 %
10 SYRINGE (ML) INJECTION EVERY 12 HOURS SCHEDULED
Status: DISCONTINUED | OUTPATIENT
Start: 2022-08-11 | End: 2022-08-11 | Stop reason: HOSPADM

## 2022-08-11 RX ORDER — GLYCOPYRROLATE 0.2 MG/ML
INJECTION INTRAMUSCULAR; INTRAVENOUS AS NEEDED
Status: DISCONTINUED | OUTPATIENT
Start: 2022-08-11 | End: 2022-08-11 | Stop reason: SURG

## 2022-08-11 RX ORDER — LIDOCAINE HYDROCHLORIDE 20 MG/ML
INJECTION, SOLUTION INFILTRATION; PERINEURAL AS NEEDED
Status: DISCONTINUED | OUTPATIENT
Start: 2022-08-11 | End: 2022-08-11 | Stop reason: SURG

## 2022-08-11 RX ORDER — SODIUM CHLORIDE, SODIUM LACTATE, POTASSIUM CHLORIDE, CALCIUM CHLORIDE 600; 310; 30; 20 MG/100ML; MG/100ML; MG/100ML; MG/100ML
30 INJECTION, SOLUTION INTRAVENOUS CONTINUOUS PRN
Status: DISCONTINUED | OUTPATIENT
Start: 2022-08-11 | End: 2022-08-11 | Stop reason: HOSPADM

## 2022-08-11 RX ADMIN — PROPOFOL 550 MG: 10 INJECTION, EMULSION INTRAVENOUS at 11:25

## 2022-08-11 RX ADMIN — LIDOCAINE HYDROCHLORIDE 100 MG: 20 INJECTION, SOLUTION INFILTRATION; PERINEURAL at 11:23

## 2022-08-11 RX ADMIN — SODIUM CHLORIDE, POTASSIUM CHLORIDE, SODIUM LACTATE AND CALCIUM CHLORIDE 30 ML/HR: 600; 310; 30; 20 INJECTION, SOLUTION INTRAVENOUS at 10:59

## 2022-08-11 RX ADMIN — GLYCOPYRROLATE 0.2 MG: 0.2 INJECTION INTRAMUSCULAR; INTRAVENOUS at 11:21

## 2022-08-11 NOTE — ANESTHESIA POSTPROCEDURE EVALUATION
"Patient: Kenyetta Montemayor    Procedure Summary     Date: 08/11/22 Room / Location: [facility] ENDOSCOPY 8 / [facility] ENDOSCOPY    Anesthesia Start: 1119 Anesthesia Stop: 1200    Procedures:       ESOPHAGOGASTRODUODENOSCOPY WITH BRAVO #6 PLACEMENT (N/A Esophagus)      COLONOSCOPY TO CECUM (N/A ) Diagnosis:       Chronic cough      Heartburn      Epigastric pain      Family history of polyps in the colon      Screening for colon cancer      (Chronic cough [R05.3])      (Heartburn [R12])      (Epigastric pain [R10.13])      (Family history of polyps in the colon [Z83.71])      (Screening for colon cancer [Z12.11])    Surgeons: Roland Chaudhry MD Provider: Lyle Saldaña MD    Anesthesia Type: MAC ASA Status: 3          Anesthesia Type: MAC    Vitals  Vitals Value Taken Time   /84 08/11/22 1218   Temp     Pulse 80 08/11/22 1218   Resp 16 08/11/22 1218   SpO2 99 % 08/11/22 1218           Post Anesthesia Care and Evaluation    Patient location during evaluation: bedside  Patient participation: complete - patient participated  Level of consciousness: awake and alert  Pain management: adequate    Airway patency: patent  Anesthetic complications: No anesthetic complications    Cardiovascular status: acceptable  Respiratory status: acceptable  Hydration status: acceptable    Comments: /84 (BP Location: Left arm, Patient Position: Sitting)   Pulse 80   Temp 36.3 °C (97.3 °F) (Oral)   Resp 16   Ht 157.5 cm (62\")   Wt 92.3 kg (203 lb 6.4 oz)   [facility] 07/26/2022 Comment: per patient \"spotting every 28 days\"  SpO2 99%   BMI 37.20 kg/m²       "

## 2022-08-11 NOTE — H&P
[facility]  Pre Procedure History & Physical    Chief Complaint:   Time for my colonoscopy and egd     Subjective     HPI:   47 y.o. female presenting to endoscopy unit today for screening, HB suspected    Past Medical History:   Past Medical History:   Diagnosis Date   • Allergic     many seasonal/indoor   • Allergic rhinitis    • Anesthesia     VERY AGITATED WHEN WAKING UP, ALSO WOKE UP DURING REMOVAL OF WISDOM TEETH   • Anxiety    • Asthma 2002?   • Atypical squamous cells of undetermined significance (ASCUS) on Papanicolaou smear of cervix    • Constipation    • Depression 2015/2016    very sad after my mom was diagnosed with brain cancer   • Encounter for IUD removal    • Fibroids    • Generalized headaches    • GERD (gastroesophageal reflux disease)     sometimes   • H/O dysmenorrhea    • H/O infertility    • H/O seasonal allergies    • Hypertension 2020   • Ovarian cyst    • Pelvic pain in female    • PMS (premenstrual syndrome)    • Scoliosis     very mild       Family History:  Family History   Problem Relation Age of Onset   • Hypertension Mother    • Breast cancer Mother 49   • Thyroid disease Mother    • Cancer Mother         breast, thyroid, brain   • Hyperlipidemia Mother    • Colon polyps Mother    • Prostate cancer Father    • Cancer Father         prostate, skin   • Breast cancer Paternal Aunt    • Diabetes Maternal Grandmother    • Hyperlipidemia Maternal Grandmother    • Hypertension Maternal Grandmother    • Colon cancer Other    • Arthritis Paternal Grandmother    • Stroke Paternal Grandmother         mini stroke   • Cancer Paternal Aunt         breast   • Depression Maternal Aunt         bipolar   • Heart disease Paternal Grandfather    • Hyperlipidemia Paternal Grandfather    • Hypertension Paternal Grandfather    • Mental illness Maternal Aunt         bipolar   • Depression Maternal Aunt         bipolar   • No Known Problems Sister    • No Known Problems Brother    •  BRCA 1/2 Neg Hx    • Endometrial cancer Neg Hx    • Ovarian cancer Neg Hx    • Malig Hyperthermia Neg Hx        Social History:   reports that she has never smoked. She has never used smokeless tobacco. She reports current alcohol use of about 7.0 - 12.0 standard drinks of alcohol per week. She reports that she does not use drugs.    Medications:   Medications Prior to Admission   Medication Sig Dispense Refill Last Dose   • albuterol sulfate  (90 Base) MCG/ACT inhaler Inhale 2 puffs Every 4 (Four) Hours As Needed for Wheezing. 6.7 g 3 Past Month at Unknown time   • azelastine (ASTELIN) 0.1 % nasal spray 2 sprays into the nostril(s) as directed by provider Every Morning. Use in each nostril as directed 3 each 3 8/10/2022 at Unknown time   • budesonide (PULMICORT) 180 MCG/ACT inhaler Inhale 1 puff 2 (Two) Times a Day. 3 each 2 8/10/2022 at Unknown time   • cetirizine (zyrTEC) 10 MG tablet Take 10 mg by mouth Daily.   8/10/2022 at Unknown time   • diazePAM (VALIUM) 5 MG tablet Take 5 mg by mouth As Needed.   8/11/2022 at Unknown time   • FLUoxetine (PROzac) 10 MG capsule TAKE 1 CAPSULE BY MOUTH  DAILY 90 capsule 3 8/10/2022 at Unknown time   • lisinopril (PRINIVIL,ZESTRIL) 20 MG tablet Take 1 tablet by mouth Daily. 90 tablet 3 8/10/2022 at Unknown time   • Methylsulfonylmethane (MSM) 1500 MG tablet Joint inflammation and seasonal allergies   Past Week at Unknown time   • montelukast (SINGULAIR) 10 MG tablet TAKE 1 TABLET BY MOUTH AT  NIGHT 90 tablet 3 8/10/2022 at Unknown time   • multivitamin with minerals tablet tablet Take 1 tablet by mouth Daily.   Past Week at Unknown time   • pantoprazole (Protonix) 20 MG EC tablet Take 1 tablet by mouth 2 (Two) Times a Day. 180 tablet 3 7/27/2022   • Triamcinolone Acetonide (NASACORT) 55 MCG/ACT nasal inhaler 1-2 sprays into the nostril(s) as directed by provider Every Morning.   8/10/2022 at Unknown time       Allergies:  Ceclor [cefaclor]    ROS:    Pertinent items are  "noted in HPI     Objective     Blood pressure 117/82, pulse 73, temperature 97.3 °F (36.3 °C), temperature source Oral, resp. rate 16, height 157.5 cm (62\"), weight 92.3 kg (203 lb 6.4 oz), last menstrual period 07/26/2022, SpO2 97 %, not currently breastfeeding.    Physical Exam   Constitutional: Pt is oriented to person, place, and time and well-developed, well-nourished, and in no distress.   Abdominal: Soft.   Psychiatric: Mood, memory, affect and judgment normal.     Assessment & Plan     Diagnosis:  Screen and heartburn/gerd suspected    Anticipated Surgical Procedure:  Colonoscopy and egd with BRAVO    The risks, benefits, and alternatives of this procedure have been discussed with the patient or the responsible party- the patient understands and agrees to proceed.                                                                "

## 2022-08-11 NOTE — ANESTHESIA PREPROCEDURE EVALUATION
Anesthesia Evaluation     Patient summary reviewed   history of anesthetic complications (hx excitability postop):  NPO Solid Status: > 8 hours             Airway   Mallampati: II  TM distance: >3 FB  Neck ROM: full  No difficulty expected  Dental - normal exam     Pulmonary - normal exam   (+) asthma,  Cardiovascular - normal exam    Rhythm: regular    (+) hypertension, hyperlipidemia,       Neuro/Psych  (+) headaches, psychiatric history Anxiety and Depression,    GI/Hepatic/Renal/Endo    (+) obesity,  GERD,      Musculoskeletal     Abdominal  - normal exam   Substance History      OB/GYN          Other                          Anesthesia Plan    ASA 3     MAC       Anesthetic plan, risks, benefits, and alternatives have been provided, discussed and informed consent has been obtained with: patient.

## 2022-08-19 ENCOUNTER — CLINICAL SUPPORT (OUTPATIENT)
Dept: FAMILY MEDICINE CLINIC | Facility: CLINIC | Age: 47
End: 2022-08-19

## 2022-08-19 DIAGNOSIS — Z23 NEED FOR VACCINATION: Primary | ICD-10-CM

## 2022-08-19 PROCEDURE — 90677 PCV20 VACCINE IM: CPT | Performed by: FAMILY MEDICINE

## 2022-08-19 PROCEDURE — 90471 IMMUNIZATION ADMIN: CPT | Performed by: FAMILY MEDICINE

## 2022-08-24 ENCOUNTER — TELEPHONE (OUTPATIENT)
Dept: GASTROENTEROLOGY | Facility: CLINIC | Age: 47
End: 2022-08-24

## 2022-08-24 NOTE — TELEPHONE ENCOUNTER
Hub staff attempted to follow warm transfer process and was unsuccessful     Caller: Kenyetta Montemayor    Relationship to patient: Self    Best call back number: [phone]    Patient is needing: PT HAD EGD AND COLONOSCOPY AND PAPER SAYS TO CALL US FOR A FOLLOW UP VISIT. PT WOULD LIKE CALL BACK ON IF SHE NEEDS TO MAKE THIS APPT. SHE IS STILL WAITING ON BRAVO RESULTS. PT WOULD LIKE A CALL BACK TO DISCUSS FURTHER.

## 2022-08-25 DIAGNOSIS — J45.909 UNCOMPLICATED ASTHMA, UNSPECIFIED ASTHMA SEVERITY, UNSPECIFIED WHETHER PERSISTENT: ICD-10-CM

## 2022-08-25 RX ORDER — MONTELUKAST SODIUM 10 MG/1
TABLET ORAL
Qty: 90 TABLET | Refills: 3 | Status: SHIPPED | OUTPATIENT
Start: 2022-08-25

## 2022-08-29 ENCOUNTER — TELEPHONE (OUTPATIENT)
Dept: GASTROENTEROLOGY | Facility: CLINIC | Age: 47
End: 2022-08-29

## 2022-08-29 RX ORDER — ESOMEPRAZOLE MAGNESIUM 40 MG/1
40 CAPSULE, DELAYED RELEASE ORAL 2 TIMES WEEKLY
Qty: 60 CAPSULE | Refills: 11 | Status: SHIPPED | OUTPATIENT
Start: 2022-08-29 | End: 2022-08-30 | Stop reason: DRUGHIGH

## 2022-08-29 NOTE — TELEPHONE ENCOUNTER
Patient called. Advised as per Dr. Chaudhry's note. She verb understanding.   F/u with Dr. Chaudhry on 09/12@8634. Julieta, practice manager notified to schedule.  Nexium script sent to [facility] pharmacy.   Results also sent via my chart for the patient.

## 2022-08-29 NOTE — PROCEDURES
HPI:   Patient reports longstanding history of intermittent chronic cough worse over the last several months reports occasional heartburn and epigastric discomfort described as a burning aching sensation that comes and goes.  Recently transition from once daily Nexium to twice daily dosing OTC Nexium.  She reports improvement in symptoms as such.  Denies nausea, vomiting, odynophagia, dysphagia.  Frequently gets globus sensation.  Symptoms can be worse at bedtime.  Sleeps with her head elevated.    She has been off her Nexium for 2 weeks supplementing with Tums Rolaids and Pepcid and she has been quite symptomatic during these 2 weeks.  Nexium absolutely helps her symptoms but she has quite a bit of breakthrough daily    Of note there is no dysphagia or weight loss and she does not have a hiatal hernia per most recent EGD done 3 weeks ago    Bravo 48-hour pH testing results:    Day 1: Number of refluxes 19, number of long refluxes 3, longest reflux was 16 minutes, DeMeester score was 16 which is considered abnormal    Day 2: Number of refluxes 31 number of long refluxes 5 the longest reflux 32 minutes and this was postprandial, DeMeester score is 30 on day 2.    Total 48-hour results: 50 refluxes, 8 long refluxes with the longest reflux being 32 minutes.  Combined DeMeester score average of 24, which is considered abnormal analyzing the graph on acid reflux shows a significant amount of acid reflux with a pH of less than 4 within 1 to 2 hours after eating.  She is symptomatic with chest pain and regurgitation during this time.    This test is abnormal and this patient has significant acid reflux particularly postprandial with significant amount of bloating, cramping, regurgitation and cough along with chest pain      Plan:    I would recommend she take a proton pump inhibitor such as Nexium at 40 mg p.o. twice daily  She can take Tums Rolaids or Pepcid if she breaks through  Based on the results of this test she  would be a candidate for fundoplication, I will have her back in the office in 2 to 3 weeks to discuss this and to see if she wants a general surgery referral

## 2022-08-29 NOTE — TELEPHONE ENCOUNTER
"----- Message from Roland Chaudhry MD sent at 8/29/2022  2:34 PM EDT -----  Regarding: Problem testing results  Julieta please notify our jv and  that the Bravo pH test results is under the procedure section in epic for them to bill for procedure    Nursing, there is the result of the test and overbook her with me in 2 to 3 weeks at 8:15 AM on a Monday or Tuesday when I am in clinic to go over results and to see if she would like to see a general surgeon she should continue her Nexium at this time    \"This test is abnormal and this patient has significant acid reflux particularly postprandial with significant amount of bloating, cramping, regurgitation and cough along with chest pain      Plan:    I would recommend she take a proton pump inhibitor such as Nexium at 40 mg p.o. twice daily  She can take Tums Rolaids or Pepcid if she breaks through  Based on the results of this test she would be a candidate for fundoplication, I will have her back in the office in 2 to 3 weeks to discuss this and to see if she wants a general surgery referral\"      "

## 2022-08-30 ENCOUNTER — TELEPHONE (OUTPATIENT)
Dept: GASTROENTEROLOGY | Facility: CLINIC | Age: 47
End: 2022-08-30

## 2022-08-30 RX ORDER — ESOMEPRAZOLE MAGNESIUM 40 MG/1
40 CAPSULE, DELAYED RELEASE ORAL 2 TIMES DAILY
Qty: 180 CAPSULE | Refills: 3 | Status: SHIPPED | OUTPATIENT
Start: 2022-08-30

## 2022-08-30 NOTE — TELEPHONE ENCOUNTER
Called pt, she states rx for Nexium was written as twice weekly instead of twice daily.  Corrected rx and sent to Dr Chaudhry to [facility].  Also, pt asked for rx to be sent to [facility] on [address].

## 2022-08-30 NOTE — TELEPHONE ENCOUNTER
Caller: Kenyetta Montemayor    Relationship: Self    Best call back number: [phone]    What is the best time to reach you: ANYTIME    Who are you requesting to speak with (clinical staff, provider,  specific staff member): CLINICAL STAFF    What was the call regarding: PATIENT WOULD LIKE TO SPEAK WITH SOMEONE IN OFFICE REGARDING PRESCRIPTION (ESOMEPRAZOLE 40 MG).     Do you require a callback: YES

## 2022-09-01 RX ORDER — FLUOXETINE 10 MG/1
10 CAPSULE ORAL DAILY
Qty: 90 CAPSULE | Refills: 3 | Status: SHIPPED | OUTPATIENT
Start: 2022-09-01 | End: 2022-12-08

## 2022-09-12 ENCOUNTER — OFFICE VISIT (OUTPATIENT)
Dept: GASTROENTEROLOGY | Facility: CLINIC | Age: 47
End: 2022-09-12

## 2022-09-12 VITALS
HEART RATE: 71 BPM | DIASTOLIC BLOOD PRESSURE: 91 MMHG | WEIGHT: 208.1 LBS | SYSTOLIC BLOOD PRESSURE: 137 MMHG | BODY MASS INDEX: 38.29 KG/M2 | TEMPERATURE: 95 F | HEIGHT: 62 IN

## 2022-09-12 DIAGNOSIS — R05.3 CHRONIC COUGH: Primary | ICD-10-CM

## 2022-09-12 PROCEDURE — 99214 OFFICE O/P EST MOD 30 MIN: CPT | Performed by: INTERNAL MEDICINE

## 2022-09-12 NOTE — PROGRESS NOTES
Chief Complaint   Patient presents with   • Bravo results     Subjective     HPI  Kenyetta Montemayor is a 47 y.o. female who presents today for follow up.  Follow-up cough, GERD regurgitation  Bravo results are as below      bravo 48-hour pH testing results:     Day 1: Number of refluxes 19, number of long refluxes 3, longest reflux was 16 minutes, DeMeester score was 16 which is considered abnormal     Day 2: Number of refluxes 31 number of long refluxes 5 the longest reflux 32 minutes and this was postprandial, DeMeester score is 30 on day 2.     Total 48-hour results: 50 refluxes, 8 long refluxes with the longest reflux being 32 minutes.  Combined DeMeester score average of 24, which is considered abnormal analyzing the graph on acid reflux shows a significant amount of acid reflux with a pH of less than 4 within 1 to 2 hours after eating.  She is symptomatic with chest pain and regurgitation during this time.     This test is abnormal and this patient has significant acid reflux particularly postprandial with significant amount of bloating, cramping, regurgitation and cough along with chest pain    I am happy to report that twice daily Nexium is controlling all of her symptoms, she currently has no nausea, vomiting weight loss dysphagia regurgitation sour brash sore throat or cough    Objective   Vitals:    09/12/22 0806   BP: 137/91   Pulse: 71   Temp: 95 °F (35 °C)       Physical Exam  Vitals reviewed.   Constitutional:       Appearance: She is well-developed.   HENT:      Head: Normocephalic and atraumatic.   Abdominal:      General: Bowel sounds are normal. There is no distension.      Palpations: Abdomen is soft. There is no mass.      Tenderness: There is no abdominal tenderness.      Hernia: No hernia is present.   Skin:     General: Skin is warm and dry.   Neurological:      Mental Status: She is alert and oriented to person, place, and time.   Psychiatric:         Behavior: Behavior normal.         Thought  Content: Thought content normal.         Judgment: Judgment normal.       The following data was reviewed by: Roland Chaudhry MD on 09/12/2022:  Common labs    Common Labsle 5/16/22 5/16/22 7/22/22 7/22/22 7/22/22    1403 1403 0928 0928 0928   Glucose  87 99     BUN  12 11     Creatinine  0.69 0.73     Sodium  138 139     Potassium  4.3 4.6     Chloride  98 103     Calcium  9.4 9.5     Total Protein  6.9 7.4     Albumin  4.2 4.5     Total Bilirubin  0.3 0.4     Alkaline Phosphatase  58 61     AST (SGOT)  15 21     ALT (SGPT)  15 22     WBC 8.4   8.8    Hemoglobin 13.2   13.8    Hematocrit 39.5   41.8    Platelets 334   332    Total Cholesterol     244 (A)   Triglycerides     166 (A)   HDL Cholesterol     59   LDL Cholesterol      155 (A)   (A) Abnormal value            CMP    CMP 5/16/22 7/22/22   Glucose 87 99   BUN 12 11   Creatinine 0.69 0.73   Sodium 138 139   Potassium 4.3 4.6   Chloride 98 103   Calcium 9.4 9.5   Total Protein 6.9 7.4   Albumin 4.2 4.5   Globulin 2.7 2.9   Total Bilirubin 0.3 0.4   Alkaline Phosphatase 58 61   AST (SGOT) 15 21   ALT (SGPT) 15 22           CBC    CBC 5/16/22 7/22/22   WBC 8.4 8.8   RBC 4.44 4.63   Hemoglobin 13.2 13.8   Hematocrit 39.5 41.8   MCV 89 90   MCH 29.7 29.8   MCHC 33.4 33.0   RDW 12.9 13.0   Platelets 334 332           CBC w/diff    CBC w/Diff 5/16/22 7/22/22   WBC 8.4 8.8   RBC 4.44 4.63   Hemoglobin 13.2 13.8   Hematocrit 39.5 41.8   MCV 89 90   MCH 29.7 29.8   MCHC 33.4 33.0   RDW 12.9 13.0   Platelets 334 332   Neutrophil Rel % 54 61   Lymphocyte Rel % 36 32   Monocyte Rel % 8 6   Eosinophil Rel % 1 1   Basophil Rel % 1 0           Lipid Panel    Lipid Panel 7/22/22   Total Cholesterol 244 (A)   Triglycerides 166 (A)   HDL Cholesterol 59   VLDL Cholesterol 30   LDL Cholesterol  155 (A)   (A) Abnormal value            TSH    TSH 5/16/22   TSH 2.070           Data reviewed: GI studies Bravo test results are as above, EGD was normal without hiatal hernia      Assessment & Plan   Assessment:     Cough  GERD  Dyspepsia      Plan:     Symptoms currently controlled with twice daily Nexium.  We have spent a great deal of time talking about her results and whether a surgical referral is appropriate at this time.  She has no hiatal hernia, so there will be no indication for fundoplication or hernia repair for this reason.  She does have an abnormal Bravo test but her symptoms are well controlled on twice daily Nexium.  We talked about long-term effects of proton pump inhibitors, we touched on bone loss and renal dysfunction along with certain infections such as C. difficile.  I reassured her that there is never been a good prospective study saying that these medications are linked to Alzheimer's disease.  We talked about osteoporosis and risk factors for osteoporosis, we talked about achieving ideal body weight plenty of cardiovascular exercise and weight strengthening to try to prevent worsening of reflux and to prevent bone loss.  I did tell her that weight loss may result in her being able to come off of PPI completely.  She can go to once a day PPI if she chooses and supplement with Pepcid or Rolaids in the evening if necessary.  Lifestyle modifications for GERD including head of the bed elevation not eating late at night and avoiding trigger foods was discussed in great detail    I talked about fundoplication and risk factors from the surgery side effects from this surgery, long-term effects of bloating and inability to belch.  Also the fact that 50 to 70% of people who have a Nissen fundoplication end up back on PPI within 5 years    At this juncture she would like to manage her symptoms with weight loss, diet modification, lifestyle modification avoiding trigger foods and taking Nexium once a day to twice daily  She will follow-up with me in 6 months to discuss further  If she ever wants referral to general surgery to discuss fundoplication we are happy to  fernando    I spent 32 minutes caring for Kenyetta on this date of service. This time includes time spent by me in the following activities:preparing for the visit, reviewing tests, obtaining and/or reviewing a separately obtained history, performing a medically appropriate examination and/or evaluation , counseling and educating the patient/family/caregiver, ordering medications, tests, or procedures, referring and communicating with other health care professionals , documenting information in the medical record, independently interpreting results and communicating that information with the patient/family/caregiver and care coordination    Roland Chaudhry MD  [facility]  [address] [place] [address]  Office: [phone]

## 2022-09-21 DIAGNOSIS — F41.9 ANXIETY: Primary | ICD-10-CM

## 2022-09-21 RX ORDER — DIAZEPAM 5 MG/1
TABLET ORAL
Qty: 20 TABLET | Refills: 0 | Status: SHIPPED | OUTPATIENT
Start: 2022-09-21

## 2022-09-27 ENCOUNTER — TELEPHONE (OUTPATIENT)
Dept: FAMILY MEDICINE CLINIC | Facility: CLINIC | Age: 47
End: 2022-09-27

## 2022-09-27 NOTE — TELEPHONE ENCOUNTER
Caller: Kenyetta Montemayor    Relationship: Self    Best call back number:[phone] (H      What medication are you requesting: Z-JOSE OR STEROID PACK     What are your current symptoms: COUGH, SORE THROAT ON LEFT SIDE , EAR FEELS FULLNESS, PAIN IN TEETH, HEADACHE ON LEFT SIDE     How long have you been experiencing symptoms: 9/19/22    Have you had these symptoms before:    [x] Yes  [] No    Have you been treated for these symptoms before:   [x] Yes  [] No    If a prescription is needed, what is your preferred pharmacy and phone number: [id number] [address] AT [facility] [address]  - [phone]  - [phone] FX     Additional notes: PATIENT IS GOING TO [facility] ON 10/3/22 AND IS WANTING TO GET SOMETHING CALLED TO HELP WITH HER SYMPTOMS BEFORE SHE LEAVES.    PATIENT HAS TAKEN AN AT HOME COVID TEST AND IT IS NEGATIVE     PLEASE ADVISE

## 2022-09-29 ENCOUNTER — OFFICE VISIT (OUTPATIENT)
Dept: FAMILY MEDICINE CLINIC | Facility: CLINIC | Age: 47
End: 2022-09-29

## 2022-09-29 VITALS
SYSTOLIC BLOOD PRESSURE: 138 MMHG | DIASTOLIC BLOOD PRESSURE: 80 MMHG | OXYGEN SATURATION: 99 % | RESPIRATION RATE: 19 BRPM | HEART RATE: 86 BPM

## 2022-09-29 DIAGNOSIS — J02.9 SORE THROAT: ICD-10-CM

## 2022-09-29 DIAGNOSIS — J01.00 ACUTE NON-RECURRENT MAXILLARY SINUSITIS: Primary | ICD-10-CM

## 2022-09-29 LAB
EXPIRATION DATE: NORMAL
EXPIRATION DATE: NORMAL
FLUAV AG UPPER RESP QL IA.RAPID: NOT DETECTED
FLUBV AG UPPER RESP QL IA.RAPID: NOT DETECTED
INTERNAL CONTROL: NORMAL
INTERNAL CONTROL: NORMAL
Lab: NORMAL
Lab: NORMAL
S PYO AG THROAT QL: NEGATIVE
SARS-COV-2 AG UPPER RESP QL IA.RAPID: NOT DETECTED

## 2022-09-29 PROCEDURE — 99213 OFFICE O/P EST LOW 20 MIN: CPT | Performed by: FAMILY MEDICINE

## 2022-09-29 PROCEDURE — 87880 STREP A ASSAY W/OPTIC: CPT | Performed by: FAMILY MEDICINE

## 2022-09-29 PROCEDURE — 87428 SARSCOV & INF VIR A&B AG IA: CPT | Performed by: FAMILY MEDICINE

## 2022-09-29 RX ORDER — METHYLPREDNISOLONE 4 MG/1
TABLET ORAL
Qty: 21 TABLET | Refills: 0 | Status: SHIPPED | OUTPATIENT
Start: 2022-09-29 | End: 2022-11-17

## 2022-09-29 RX ORDER — AZITHROMYCIN 250 MG/1
TABLET, FILM COATED ORAL
Qty: 6 TABLET | Refills: 0 | Status: SHIPPED | OUTPATIENT
Start: 2022-09-29 | End: 2022-11-17

## 2022-09-29 NOTE — PROGRESS NOTES
Subjective   Kenyetta Montemayor is a 47 y.o. female.   Cough (X2 weeks), Sore Throat (Neg strep ), and Facial Pain    History of Present Illness   Kenyetta is here with a 2 to 3-week history of cough, sore throat, and facial pain.  She has been tested for strep and is negative.  She has chronic allergies and is pretty sure that all this started with seasonal allergies.  She has struggled with frequent upper respiratory infections.  She has been tested at home for COVID and is negative.  She is about to go on a long vacation and is concerned about getting this cleared up before she leaves town.    The following portions of the patient's history were reviewed and updated as appropriate: allergies, current medications, past family history, past medical history, past social history, past surgical history and problem list.    Review of Systems   HENT: Positive for congestion, facial swelling and sore throat.    Respiratory: Positive for cough.        Objective   Physical Exam  Vitals and nursing note reviewed.   Constitutional:       General: She is not in acute distress.     Appearance: She is well-developed.   HENT:      Head: Normocephalic and atraumatic.      Right Ear: External ear normal.      Left Ear: External ear normal.      Nose: Nose normal.      Mouth/Throat:      Pharynx: No oropharyngeal exudate.   Eyes:      Conjunctiva/sclera: Conjunctivae normal.      Pupils: Pupils are equal, round, and reactive to light.   Cardiovascular:      Rate and Rhythm: Normal rate and regular rhythm.   Pulmonary:      Effort: Pulmonary effort is normal. No respiratory distress.      Breath sounds: Normal breath sounds. No stridor. No wheezing.   Musculoskeletal:      Cervical back: Normal range of motion.   Lymphadenopathy:      Cervical: No cervical adenopathy.   Skin:     General: Skin is warm and dry.      Findings: No rash.   Neurological:      Mental Status: She is alert and oriented to person, place, and time.            Assessment & Plan   Problem List Items Addressed This Visit        ENT    Acute non-recurrent maxillary sinusitis - Primary      Other Visit Diagnoses     Sore throat        Relevant Medications    azithromycin (Zithromax Z-Alvaro) 250 MG tablet    methylPREDNISolone (MEDROL) 4 MG dose pack    Other Relevant Orders    POCT SARS-CoV-2 Antigen HUA + Flu (Completed)    POCT rapid strep A (Completed)      I have started her on an antibiotic and given her steroid pack to take just in case and advised over-the-counter medication for symptoms.         Return if symptoms worsen or fail to improve.   Answers for HPI/ROS submitted by the patient on 9/28/2022  Please describe your symptoms.: sinus drainage, cough, ear pressure on left side, throat pain on left side, headaches (worse on left side), tired  Have you had these symptoms before?: Yes  How long have you been having these symptoms?: 1-2 weeks  Please list any medications you are currently taking for this condition.: All of my usual allergy meds, plus sinus rinses, ibuprofen at night  Please describe any probable cause for these symptoms. : Possible sinus infection, which often happens in the fall when the weather changes.  What is the primary reason for your visit?: Other

## 2022-11-17 ENCOUNTER — OFFICE VISIT (OUTPATIENT)
Dept: FAMILY MEDICINE CLINIC | Facility: CLINIC | Age: 47
End: 2022-11-17

## 2022-11-17 VITALS
DIASTOLIC BLOOD PRESSURE: 82 MMHG | HEIGHT: 62 IN | WEIGHT: 205 LBS | BODY MASS INDEX: 37.73 KG/M2 | OXYGEN SATURATION: 99 % | HEART RATE: 79 BPM | SYSTOLIC BLOOD PRESSURE: 138 MMHG

## 2022-11-17 DIAGNOSIS — R19.7 DIARRHEA IN ADULT PATIENT: Primary | ICD-10-CM

## 2022-11-17 DIAGNOSIS — R11.0 NAUSEA: ICD-10-CM

## 2022-11-17 PROCEDURE — 99213 OFFICE O/P EST LOW 20 MIN: CPT | Performed by: NURSE PRACTITIONER

## 2022-11-17 RX ORDER — ONDANSETRON 4 MG/1
4 TABLET, FILM COATED ORAL EVERY 8 HOURS PRN
Qty: 12 TABLET | Refills: 0 | Status: SHIPPED | OUTPATIENT
Start: 2022-11-17 | End: 2022-11-21

## 2022-11-17 RX ORDER — DICYCLOMINE HYDROCHLORIDE 10 MG/1
10 CAPSULE ORAL
Qty: 20 CAPSULE | Refills: 0 | Status: SHIPPED | OUTPATIENT
Start: 2022-11-17 | End: 2022-11-22

## 2022-11-17 NOTE — PROGRESS NOTES
Subjective   Kenyetta Montemayor is a 47 y.o. female.     History of Present Illness   Dr White patient. New to this provider.     Just got back from [facility] x 2.5 days   Saturday AM loose stools , she ate pizza late at night the night before  and had vomiting and diarrhea. Vomiting stopped after a day or so. Continues to have  nausea. She has continued to have diarrhea 6 days now. Approx 5-6 bouts of diarrhea /day , yellow watery diarrhea. No fever. No blood in stool. She has been able to eat applesauce and some rolls, bananas. She has not taken any otc meds. Was on abx last month, no hx of infectious diarrhea.     The following portions of the patient's history were reviewed and updated as appropriate: allergies, current medications, past family history, past medical history, past social history, past surgical history and problem list.    Review of Systems    Objective   Physical Exam  Vitals reviewed.   Constitutional:       Appearance: She is obese. She is ill-appearing.   HENT:      Head: Normocephalic.      Mouth/Throat:      Mouth: Mucous membranes are moist.   Cardiovascular:      Rate and Rhythm: Normal rate and regular rhythm.      Pulses: Normal pulses.      Heart sounds: Normal heart sounds.   Pulmonary:      Effort: Pulmonary effort is normal.      Breath sounds: Normal breath sounds.   Abdominal:      General: Bowel sounds are normal.      Palpations: Abdomen is soft.      Tenderness: There is no abdominal tenderness. There is no guarding or rebound.   Musculoskeletal:         General: Normal range of motion.      Cervical back: Normal range of motion.   Skin:     General: Skin is warm and dry.   Neurological:      General: No focal deficit present.      Mental Status: She is alert and oriented to person, place, and time.         Vitals:    11/17/22 1319   BP: 138/82   Pulse: 79   SpO2: 99%     Body mass index is 37.49 kg/m².    Procedures    Assessment & Plan   Problems Addressed this Visit    None  Visit  Diagnoses     Diarrhea in adult patient    -  Primary    Nausea          Diagnoses       Codes Comments    Diarrhea in adult patient    -  Primary ICD-10-CM: R19.7  ICD-9-CM: 787.91     Nausea     ICD-10-CM: R11.0  ICD-9-CM: 787.02         Diarrhea - like gastroenteritis, declines stool cx or O&P, no fever or sx c diff.   zofran for nausea 4mg tid prn, this may cause sedation  bentyl 10 mg for cramping qid before eating  Hydrate w water, gatorade   BRATY diet , mostly liquids for next 2 days  If fever, severe pain go to ER         Education provided in AVS   Return if symptoms worsen or fail to improve.

## 2022-12-08 ENCOUNTER — PROCEDURE VISIT (OUTPATIENT)
Dept: OBSTETRICS AND GYNECOLOGY | Age: 47
End: 2022-12-08

## 2022-12-08 ENCOUNTER — OFFICE VISIT (OUTPATIENT)
Dept: OBSTETRICS AND GYNECOLOGY | Age: 47
End: 2022-12-08

## 2022-12-08 ENCOUNTER — APPOINTMENT (OUTPATIENT)
Dept: WOMENS IMAGING | Facility: HOSPITAL | Age: 47
End: 2022-12-08

## 2022-12-08 VITALS
HEIGHT: 62 IN | WEIGHT: 209 LBS | SYSTOLIC BLOOD PRESSURE: 134 MMHG | DIASTOLIC BLOOD PRESSURE: 80 MMHG | BODY MASS INDEX: 38.46 KG/M2

## 2022-12-08 DIAGNOSIS — Z12.4 SCREENING FOR CERVICAL CANCER: ICD-10-CM

## 2022-12-08 DIAGNOSIS — N95.1 MENOPAUSAL SYMPTOMS: ICD-10-CM

## 2022-12-08 DIAGNOSIS — Z12.31 VISIT FOR SCREENING MAMMOGRAM: Primary | ICD-10-CM

## 2022-12-08 DIAGNOSIS — Z11.51 SCREENING FOR HPV (HUMAN PAPILLOMAVIRUS): ICD-10-CM

## 2022-12-08 DIAGNOSIS — Z01.419 ENCOUNTER FOR GYNECOLOGICAL EXAMINATION WITHOUT ABNORMAL FINDING: Primary | ICD-10-CM

## 2022-12-08 PROCEDURE — 77067 SCR MAMMO BI INCL CAD: CPT | Performed by: RADIOLOGY

## 2022-12-08 PROCEDURE — 99396 PREV VISIT EST AGE 40-64: CPT | Performed by: OBSTETRICS & GYNECOLOGY

## 2022-12-08 PROCEDURE — 77063 BREAST TOMOSYNTHESIS BI: CPT | Performed by: OBSTETRICS & GYNECOLOGY

## 2022-12-08 PROCEDURE — 77067 SCR MAMMO BI INCL CAD: CPT | Performed by: OBSTETRICS & GYNECOLOGY

## 2022-12-08 PROCEDURE — 77063 BREAST TOMOSYNTHESIS BI: CPT | Performed by: RADIOLOGY

## 2022-12-08 RX ORDER — FLUOXETINE HYDROCHLORIDE 20 MG/1
20 CAPSULE ORAL DAILY
Qty: 90 CAPSULE | Refills: 3 | Status: SHIPPED | OUTPATIENT
Start: 2022-12-08

## 2022-12-08 NOTE — PROGRESS NOTES
Routine Annual Visit    2022    Patient: Kenyetta Montemayor          MR#:[id number]      Chief Complaint   Patient presents with   • Gynecologic Exam     Mammo @ 1:20, Last Pap 2/3/20 (-), No concerns at this time,       History of Present Illness    47 y.o. female  who presents for annual exam.     Some HF  Decreased libido  Prolapse symptoms when has to have BM    Light menses    Status post ablation    Dog Sanaz doing well, 12 lbs    Due for pap   [place]ammo today  [facility] [place]       No LMP recorded (exact date). Patient has had an ablation.  Obstetric History:  OB History        0    Para   0    Term   0       0    AB   0    Living   0       SAB   0    IAB   0    Ectopic   0    Molar   0    Multiple   0    Live Births   0               Menstrual History:     No LMP recorded (exact date). Patient has had an ablation.       Sexual History:       ________________________________________  Patient Active Problem List   Diagnosis   • Family history of breast cancer in mother   • Asthma   • H/O dysmenorrhea   • Anxiety   • Menorrhagia with regular cycle   • Essential hypertension, benign   • Chronic cough   • Heartburn   • Epigastric pain   • Family history of polyps in the colon   • Screening for colon cancer   • Mixed hyperlipidemia   • Acute non-recurrent maxillary sinusitis       Past Medical History:   Diagnosis Date   • Allergic     many seasonal/indoor   • Allergic rhinitis    • Anesthesia     VERY AGITATED WHEN WAKING UP, ALSO WOKE UP DURING REMOVAL OF WISDOM TEETH   • Anxiety    • Asthma ?   • Atypical squamous cells of undetermined significance (ASCUS) on Papanicolaou smear of cervix    • Constipation    • Depression     very sad after my mom was diagnosed with brain cancer   • Encounter for IUD removal    • Endometriosis     Unconfirmed-Dr CAI suggest laparoscopy if pursuing pregnancy   • Female infertility     Had trouble getting pregnant, did not pursue   • Fibroid 2019, maybe   •  Fibroids    • Generalized headaches    • GERD (gastroesophageal reflux disease)     sometimes   • H/O dysmenorrhea    • H/O infertility    • H/O seasonal allergies    • Hypertension 2020   • Migraine     Painful headaches with periods   • Ovarian cyst    • Pelvic pain in female    • Pelvic prolapse    • PMS (premenstrual syndrome)    • PONV (postoperative nausea and vomiting) Feb 2016    Nauseous and hard to wake after deviated septum repair   • Scoliosis     very mild   • Varicella 1982       Past Surgical History:   Procedure Laterality Date   • BRAVO PROCEDURE N/A 08/11/2022    Procedure: ESOPHAGOGASTRODUODENOSCOPY WITH BRAVO #6 PLACEMENT;  Surgeon: Roland Chaudhry MD;  Location: [facility] ENDOSCOPY;  Service: Gastroenterology;  Laterality: N/A;  PRE- GERD  POST- NORMAL   • COLONOSCOPY N/A 08/11/2022    Procedure: COLONOSCOPY TO CECUM;  Surgeon: Roland Chaudhry MD;  Location: [facility] ENDOSCOPY;  Service: Gastroenterology;  Laterality: N/A;  PRE- SCREENING  POST- HEMORRHOIDS, DIVERTICULOSIS   • D & C HYSTEROSCOPY ENDOMETRIAL ABLATION N/A 02/03/2020    Procedure: DILATATION AND CURETTAGE HYSTEROSCOPY NOVASURE ENDOMETRIAL ABLATION;  Surgeon: Onelia Tuttle MD;  Location: [facility] MAIN OR;  Service: Obstetrics/Gynecology;  Laterality: N/A;   • DILATATION AND CURETTAGE  2/2020    As part of endometrial ablation   • ENDOMETRIAL ABLATION  2/2020   • SEPTOPLASTY, RESECTION INFERIOR TURBINATES  02/2016   • UPPER GASTROINTESTINAL ENDOSCOPY  8/11/2022   • WISDOM TOOTH EXTRACTION  1990       Social History     Tobacco Use   Smoking Status Never   Smokeless Tobacco Never       has a current medication list which includes the following prescription(s): albuterol sulfate hfa, azelastine, budesonide, cetirizine, diazepam, esomeprazole, lisinopril, msm, montelukast, multivitamin with minerals, triamcinolone acetonide, and fluoxetine.  ________________________________________    Current contraception: none  History of abnormal Pap  "smear: no  Family history of Breast cancer: mother  Family history of uterine or ovarian cancer: no  Family History of colon cancer/colon polyps: no  History of abnormal mammogram: no      The following portions of the patient's history were reviewed and updated as appropriate: allergies, current medications, past family history, past medical history, past social history, past surgical history and problem list.    Review of Systems    Pertinent items are noted in HPI.     Objective   Physical Exam    /80   Ht 157.5 cm (62\")   Wt 94.8 kg (209 lb)   LMP  (Exact Date)   BMI 38.23 kg/m²    BP Readings from Last 3 Encounters:   12/08/22 134/80   11/17/22 138/82   09/29/22 138/80      Wt Readings from Last 3 Encounters:   12/08/22 94.8 kg (209 lb)   11/17/22 93 kg (205 lb)   09/12/22 94.4 kg (208 lb 1.6 oz)      BMI: Estimated body mass index is 38.23 kg/m² as calculated from the following:    Height as of this encounter: 157.5 cm (62\").    Weight as of this encounter: 94.8 kg (209 lb).      General:   alert, appears stated age and cooperative   Abdomen: soft, non-tender, without masses or organomegaly   Breast: inspection negative, no nipple discharge or bleeding, no masses or nodularity palpable   Vulva: normal, Bartholin's, Urethra, [place]'s normal   Vagina: normal mucosa, mild uterine prolapse   Cervix: no cervical motion tenderness and no lesions   Uterus: normal size, mobile and non-tender   Adnexa: no mass, fullness, tenderness     Assessment:    1. Normal annual exam   Assessment     ICD-10-CM ICD-9-CM   1. Encounter for gynecological examination without abnormal finding  Z01.419 V72.31   2. Screening for cervical cancer  Z12.4 V76.2   3. Screening for HPV (human papillomavirus)  Z11.51 V73.81   4. Menopausal symptoms  N95.1 627.2     Plan:    Plan     [x]  Mammogram request made  [x]  PAP done  []  Labs:   []  GC/Chl/TV  []  DEXA scan   []  Referral for colonoscopy:       Diagnoses and all orders for " this visit:    1. Encounter for gynecological examination without abnormal finding (Primary)    2. Screening for cervical cancer  -     IGP, Apt HPV,rfx 16 / 18,45    3. Screening for HPV (human papillomavirus)  -     IGP, Apt HPV,rfx 16 / 18,45    4. Menopausal symptoms  -     Follicle Stimulating Hormone    Other orders  -     FLUoxetine (PROzac) 20 MG capsule; Take 1 capsule by mouth Daily.  Dispense: 90 capsule; Refill: 3      Will increase prozac, mood swings  Consider pelvic floor therapy  Schedule gyn US        Counseling:  --Nutrition: Stressed importance of moderation and caloric balance, stressed fresh fruit and vegetables  --Exercise: Stressed the importance of regular exercise. 3-5 times weekly   - Discussed screening mammogram recommendations.   --Discussed benefits of screening colonoscopy- age 45 unless FH  --Discussed pap smear screening recommendations

## 2022-12-09 LAB — FSH SERPL-ACNC: 6.3 MIU/ML

## 2022-12-16 LAB
CYTOLOGIST CVX/VAG CYTO: NORMAL
CYTOLOGY CVX/VAG DOC CYTO: NORMAL
CYTOLOGY CVX/VAG DOC THIN PREP: NORMAL
DX ICD CODE: NORMAL
HIV 1 & 2 AB SER-IMP: NORMAL
HPV I/H RISK 4 DNA CVX QL PROBE+SIG AMP: NEGATIVE
OTHER STN SPEC: NORMAL
STAT OF ADQ CVX/VAG CYTO-IMP: NORMAL

## 2023-01-03 ENCOUNTER — OFFICE VISIT (OUTPATIENT)
Dept: OBSTETRICS AND GYNECOLOGY | Age: 48
End: 2023-01-03
Payer: COMMERCIAL

## 2023-01-03 VITALS
WEIGHT: 209.4 LBS | DIASTOLIC BLOOD PRESSURE: 78 MMHG | BODY MASS INDEX: 38.53 KG/M2 | HEIGHT: 62 IN | SYSTOLIC BLOOD PRESSURE: 136 MMHG

## 2023-01-03 DIAGNOSIS — N94.6 DYSMENORRHEA: ICD-10-CM

## 2023-01-03 DIAGNOSIS — D25.1 INTRAMURAL LEIOMYOMA OF UTERUS: Primary | ICD-10-CM

## 2023-01-03 PROCEDURE — 99213 OFFICE O/P EST LOW 20 MIN: CPT | Performed by: OBSTETRICS & GYNECOLOGY

## 2023-01-03 RX ORDER — GABAPENTIN 100 MG/1
600 CAPSULE ORAL ONCE
Status: CANCELLED | OUTPATIENT
Start: 2023-02-07 | End: 2023-01-03

## 2023-01-03 RX ORDER — SCOLOPAMINE TRANSDERMAL SYSTEM 1 MG/1
1 PATCH, EXTENDED RELEASE TRANSDERMAL CONTINUOUS
Status: CANCELLED | OUTPATIENT
Start: 2023-02-07 | End: 2023-02-10

## 2023-01-03 RX ORDER — SODIUM CHLORIDE 9 MG/ML
40 INJECTION, SOLUTION INTRAVENOUS AS NEEDED
Status: CANCELLED | OUTPATIENT
Start: 2023-02-07

## 2023-01-03 RX ORDER — SODIUM CHLORIDE 0.9 % (FLUSH) 0.9 %
10 SYRINGE (ML) INJECTION AS NEEDED
Status: CANCELLED | OUTPATIENT
Start: 2023-02-07

## 2023-01-03 RX ORDER — ACETAMINOPHEN 500 MG
1000 TABLET ORAL ONCE
Status: CANCELLED | OUTPATIENT
Start: 2023-02-07 | End: 2023-01-03

## 2023-01-03 RX ORDER — CLINDAMYCIN PHOSPHATE 900 MG/50ML
900 INJECTION INTRAVENOUS ONCE
Status: CANCELLED | OUTPATIENT
Start: 2023-02-07 | End: 2023-01-03

## 2023-01-03 RX ORDER — SODIUM CHLORIDE 0.9 % (FLUSH) 0.9 %
10 SYRINGE (ML) INJECTION EVERY 12 HOURS SCHEDULED
Status: CANCELLED | OUTPATIENT
Start: 2023-02-07

## 2023-01-03 NOTE — PROGRESS NOTES
GYN Visit    1/3/2023    Patient: Kenyetta Montemayor          MR#:[id number]      Chief Complaint   Patient presents with   • Follow-up     U/S today, 3 wk F/U       History of Present Illness    47 y.o. female  who presents for follow up to dysmenorrhea  Reviewed US- fibroids slowly growing from 2019  Also with some urinary frequency, infrequent incontinence  Discussed options , pt prefers hyst, RBA discussed  Depo lupron, ocps and expectant management also discussed, pt prefers hyst          No LMP recorded (exact date). Patient has had an ablation.    ________________________________________  Patient Active Problem List   Diagnosis   • Family history of breast cancer in mother   • Asthma   • H/O dysmenorrhea   • Anxiety   • Menorrhagia with regular cycle   • Essential hypertension, benign   • Chronic cough   • Heartburn   • Epigastric pain   • Family history of polyps in the colon   • Screening for colon cancer   • Mixed hyperlipidemia   • Acute non-recurrent maxillary sinusitis       Past Medical History:   Diagnosis Date   • Allergic     many seasonal/indoor   • Allergic rhinitis    • Anesthesia     VERY AGITATED WHEN WAKING UP, ALSO WOKE UP DURING REMOVAL OF WISDOM TEETH   • Anxiety    • Asthma ?   • Atypical squamous cells of undetermined significance (ASCUS) on Papanicolaou smear of cervix    • Constipation    • Depression     very sad after my mom was diagnosed with brain cancer   • Encounter for IUD removal    • Endometriosis     Unconfirmed-Dr CAI suggest laparoscopy if pursuing pregnancy   • Female infertility     Had trouble getting pregnant, did not pursue   • Fibroid , maybe   • Fibroids    • Generalized headaches    • GERD (gastroesophageal reflux disease)     sometimes   • H/O dysmenorrhea    • H/O infertility    • H/O seasonal allergies    • Hypertension    • Migraine     Painful headaches with periods   • Ovarian cyst    • Pelvic pain in female    • Pelvic prolapse    • PMS  (premenstrual syndrome)    • PONV (postoperative nausea and vomiting) Feb 2016    Nauseous and hard to wake after deviated septum repair   • Scoliosis     very mild   • Varicella 1982       Past Surgical History:   Procedure Laterality Date   • BRAVO PROCEDURE N/A 08/11/2022    Procedure: ESOPHAGOGASTRODUODENOSCOPY WITH BRAVO #6 PLACEMENT;  Surgeon: Roland Chaudhry MD;  Location: [facility] ENDOSCOPY;  Service: Gastroenterology;  Laterality: N/A;  PRE- GERD  POST- NORMAL   • COLONOSCOPY N/A 08/11/2022    Procedure: COLONOSCOPY TO CECUM;  Surgeon: Roland Chaudhry MD;  Location: [facility] ENDOSCOPY;  Service: Gastroenterology;  Laterality: N/A;  PRE- SCREENING  POST- HEMORRHOIDS, DIVERTICULOSIS   • D & C HYSTEROSCOPY ENDOMETRIAL ABLATION N/A 02/03/2020    Procedure: DILATATION AND CURETTAGE HYSTEROSCOPY NOVASURE ENDOMETRIAL ABLATION;  Surgeon: Onelia Tuttle MD;  Location: [facility] MAIN OR;  Service: Obstetrics/Gynecology;  Laterality: N/A;   • DILATATION AND CURETTAGE  2/2020    As part of endometrial ablation   • ENDOMETRIAL ABLATION  2/2020   • SEPTOPLASTY, RESECTION INFERIOR TURBINATES  02/2016   • UPPER GASTROINTESTINAL ENDOSCOPY  8/11/2022   • WISDOM TOOTH EXTRACTION  1990       Social History     Tobacco Use   Smoking Status Never   Smokeless Tobacco Never       has a current medication list which includes the following prescription(s): albuterol sulfate hfa, azelastine, budesonide, cetirizine, diazepam, esomeprazole, fluoxetine, lisinopril, montelukast, multivitamin with minerals, and triamcinolone acetonide.  ________________________________________    Current contraception: none      The following portions of the patient's history were reviewed and updated as appropriate: allergies, current medications, past family history, past medical history, past social history, past surgical history and problem list.    Review of Systems    Pertinent items are noted in HPI.     Objective   Physical Exam    /78   Ht  157.5 cm (62\")   Wt 95 kg (209 lb 6.4 oz)   LMP  (Exact Date)   BMI 38.30 kg/m²    BP Readings from Last 3 Encounters:   01/03/23 136/78   12/08/22 134/80   11/17/22 138/82      Wt Readings from Last 3 Encounters:   01/03/23 95 kg (209 lb 6.4 oz)   12/08/22 94.8 kg (209 lb)   11/17/22 93 kg (205 lb)      BMI: Estimated body mass index is 38.3 kg/m² as calculated from the following:    Height as of this encounter: 157.5 cm (62\").    Weight as of this encounter: 95 kg (209 lb 6.4 oz).    Lungs: non labored breathing, no wheezing or tachpnea  Extremities: extremities normal, atraumatic, no cyanosis or edema  Skin: Skin color, texture, turgor normal. No rashes or lesions  Neurologic: Grossly normal  General:   alert, appears stated age and cooperative   Abdomen: soft, non-tender, without masses or organomegaly            See US- vol is 264, 5 fibroids, ovaries normal, largest fibroid is 3 cm  Compared to 2019 , uterus slightly larger and more fibroids                 Assessment:      Diagnoses and all orders for this visit:    1. Intramural leiomyoma of uterus (Primary)  -     sodium chloride 0.9 % flush 10 mL  -     sodium chloride 0.9 % flush 10 mL  -     sodium chloride 0.9 % infusion 40 mL  -     Case Request; Standing  -     acetaminophen (TYLENOL) tablet 1,000 mg  -     gabapentin (NEURONTIN) capsule 600 mg  -     scopolamine patch 1 mg/72 hr  -     clindamycin (CLEOCIN) 900 mg in dextrose (D5W) 5 % 100 mL IVPB  -     gentamicin (GARAMYCIN) 340 mg in sodium chloride 0.9 % IVPB  -     Case Request    2. Dysmenorrhea  -     sodium chloride 0.9 % flush 10 mL  -     sodium chloride 0.9 % flush 10 mL  -     sodium chloride 0.9 % infusion 40 mL  -     Case Request; Standing  -     acetaminophen (TYLENOL) tablet 1,000 mg  -     gabapentin (NEURONTIN) capsule 600 mg  -     scopolamine patch 1 mg/72 hr  -     clindamycin (CLEOCIN) 900 mg in dextrose (D5W) 5 % 100 mL IVPB  -     gentamicin (GARAMYCIN) 340 mg in sodium  chloride 0.9 % IVPB  -     Case Request    Other orders  -     Code Status and Medical Interventions:; Standing  -     Follow Anesthesia Guidelines / Protocol; Future  -     Chlorhexidine Skin Prep; Future  -     Follow Anesthesia Guidelines / Protocol; Standing  -     Obtain Informed Consent; Standing  -     Verify / Perform Chlorhexidine Skin Prep; Standing  -     Insert Peripheral IV; Standing  -     Saline Lock & Maintain IV Access; Standing

## 2023-01-24 ENCOUNTER — OFFICE VISIT (OUTPATIENT)
Dept: FAMILY MEDICINE CLINIC | Facility: CLINIC | Age: 48
End: 2023-01-24
Payer: COMMERCIAL

## 2023-01-24 VITALS
WEIGHT: 212 LBS | SYSTOLIC BLOOD PRESSURE: 140 MMHG | HEIGHT: 62 IN | OXYGEN SATURATION: 97 % | HEART RATE: 71 BPM | BODY MASS INDEX: 39.01 KG/M2 | RESPIRATION RATE: 16 BRPM | DIASTOLIC BLOOD PRESSURE: 80 MMHG

## 2023-01-24 DIAGNOSIS — G47.9 SLEEP DISTURBANCE: ICD-10-CM

## 2023-01-24 DIAGNOSIS — I10 ESSENTIAL HYPERTENSION, BENIGN: Primary | ICD-10-CM

## 2023-01-24 PROCEDURE — 99213 OFFICE O/P EST LOW 20 MIN: CPT | Performed by: FAMILY MEDICINE

## 2023-01-24 NOTE — PROGRESS NOTES
"Subjective   Kenyetta Montemayor is a 47 y.o. female.   Hypertension and Insomnia    History of Present Illness   Kenyetta is here for bp check and to discuss possible sleep study.  Kenyetta has chronic hypertension and has been well controlled on current medications.She  is monitored by me every 6 months and is here today for follow up. She is tolerating medications without side effect. She reports no vision changes, headaches or lightheadedness. She is requesting refills of medications.     She states she has difficulty falling asleep and her  states she is \"noisey\" sleeper.  She is willing to consult with a sleep specialist.    She is anticipating a hysterectomy in early February.  She is followed by Dr. Tuttle.    Review of Systems   Constitutional: Negative.    HENT: Negative.    Eyes: Negative.    Respiratory: Negative.    Cardiovascular: Negative.    Genitourinary: Negative.    Skin: Negative.    Neurological: Negative.        Objective   Vitals:    01/24/23 1323 01/24/23 1404   BP: 150/94 140/80   Pulse: 71    Resp: 16    SpO2: 97%    Weight: 96.2 kg (212 lb)    Height: 157.5 cm (62\")       Body mass index is 38.78 kg/m².    Physical Exam      Assessment & Plan   Problem List Items Addressed This Visit        Cardiac and Vasculature    Essential hypertension, benign - Primary    Overview     She has been well controlled on current medication,lisinopril 20 mg, will refill medication today and as needed.  She was a little elevated when she first got to the office but the repeat evaluation after she had time to sit was back to normal range.  She will RTO for repeat B/P check in 6 months.  She is aware that she has an element of whitecoat hypertension and often has elevated blood pressure in the office.  Her blood pressure runs normal on her home cuff.          Other Visit Diagnoses     Sleep disturbance      After discussion of her symptoms, she has requested a desire to be seen by sleep medicine.    Relevant " Orders    Ambulatory Referral to Sleep Medicine             Orders Placed This Encounter   Procedures   • Ambulatory Referral to Sleep Medicine        Return if symptoms worsen or fail to improve.   Answers for HPI/ROS submitted by the patient on 1/22/2023  What is the primary reason for your visit?: High Blood Pressure

## 2023-02-03 ENCOUNTER — PRE-ADMISSION TESTING (OUTPATIENT)
Dept: PREADMISSION TESTING | Facility: HOSPITAL | Age: 48
End: 2023-02-03
Payer: COMMERCIAL

## 2023-02-03 VITALS
RESPIRATION RATE: 20 BRPM | WEIGHT: 214 LBS | BODY MASS INDEX: 39.38 KG/M2 | HEART RATE: 86 BPM | DIASTOLIC BLOOD PRESSURE: 87 MMHG | OXYGEN SATURATION: 97 % | SYSTOLIC BLOOD PRESSURE: 134 MMHG | HEIGHT: 62 IN | TEMPERATURE: 99.5 F

## 2023-02-03 LAB
ANION GAP SERPL CALCULATED.3IONS-SCNC: 9 MMOL/L (ref 5–15)
BUN SERPL-MCNC: 10 MG/DL (ref 6–20)
BUN/CREAT SERPL: 15.4 (ref 7–25)
CALCIUM SPEC-SCNC: 9.4 MG/DL (ref 8.6–10.5)
CHLORIDE SERPL-SCNC: 101 MMOL/L (ref 98–107)
CO2 SERPL-SCNC: 28 MMOL/L (ref 22–29)
CREAT SERPL-MCNC: 0.65 MG/DL (ref 0.57–1)
DEPRECATED RDW RBC AUTO: 41.6 FL (ref 37–54)
EGFRCR SERPLBLD CKD-EPI 2021: 110.8 ML/MIN/1.73
ERYTHROCYTE [DISTWIDTH] IN BLOOD BY AUTOMATED COUNT: 12.5 % (ref 12.3–15.4)
GLUCOSE SERPL-MCNC: 78 MG/DL (ref 65–99)
HCG SERPL QL: NEGATIVE
HCT VFR BLD AUTO: 39.3 % (ref 34–46.6)
HGB BLD-MCNC: 13.3 G/DL (ref 12–15.9)
MCH RBC QN AUTO: 30.4 PG (ref 26.6–33)
MCHC RBC AUTO-ENTMCNC: 33.8 G/DL (ref 31.5–35.7)
MCV RBC AUTO: 89.9 FL (ref 79–97)
PLATELET # BLD AUTO: 326 10*3/MM3 (ref 140–450)
PMV BLD AUTO: 9.4 FL (ref 6–12)
POTASSIUM SERPL-SCNC: 4.2 MMOL/L (ref 3.5–5.2)
RBC # BLD AUTO: 4.37 10*6/MM3 (ref 3.77–5.28)
SODIUM SERPL-SCNC: 138 MMOL/L (ref 136–145)
WBC NRBC COR # BLD: 8.71 10*3/MM3 (ref 3.4–10.8)

## 2023-02-03 PROCEDURE — 84703 CHORIONIC GONADOTROPIN ASSAY: CPT

## 2023-02-03 PROCEDURE — 85027 COMPLETE CBC AUTOMATED: CPT

## 2023-02-03 PROCEDURE — 36415 COLL VENOUS BLD VENIPUNCTURE: CPT

## 2023-02-03 PROCEDURE — 80048 BASIC METABOLIC PNL TOTAL CA: CPT

## 2023-02-03 NOTE — DISCHARGE INSTRUCTIONS
Take the following medications the morning of surgery:    PULMICORT INHALER, PROZAC, NEXIUM    ARRIVE TO MAIN OR DESK 2/7/23 AT 10:00AM    If you are on prescription narcotic pain medication to control your pain you may also take that medication the morning of surgery.    General Instructions:  Do not eat solid food after midnight the night before surgery.  You may drink clear liquids day of surgery but must stop at least one hour before your hospital arrival time.  It is beneficial for you to have a clear drink that contains carbohydrates the day of surgery.  We suggest a 12 to 20 ounce bottle of Gatorade or Powerade for non-diabetic patients or a 12 to 20 ounce bottle of G2 or Powerade Zero for diabetic patients. (Pediatric patients, are not advised to drink a 12 to 20 ounce carbohydrate drink)    Clear liquids are liquids you can see through.  Nothing red in color.     Plain water                               Sports drinks  Sodas                                   Gelatin (Jell-O)  Fruit juices without pulp such as white grape juice and apple juice  Popsicles that contain no fruit or yogurt  Tea or coffee (no cream or milk added)  Gatorade / Powerade  G2 / Powerade Zero    Infants may have breast milk up to four hours before surgery.  Infants drinking formula may drink formula up to six hours before surgery.   Patients who avoid smoking, chewing tobacco and alcohol for 4 weeks prior to surgery have a reduced risk of post-operative complications.  Quit smoking as many days before surgery as you can.  Do not smoke, use chewing tobacco or drink alcohol the day of surgery.   If applicable bring your C-PAP/ BI-PAP machine.  Bring any papers given to you in the doctor’s office.  Wear clean comfortable clothes.  Do not wear contact lenses, false eyelashes or make-up.  Bring a case for your glasses.   Bring crutches or walker if applicable.  Remove all piercings.  Leave jewelry and any other valuables at home.  Hair  extensions with metal clips must be removed prior to surgery.  The Pre-Admission Testing nurse will instruct you to bring medications if unable to obtain an accurate list in Pre-Admission Testing.      Preventing a Surgical Site Infection:  For 2 to 3 days before surgery, avoid shaving with a razor because the razor can irritate skin and make it easier to develop an infection.    Any areas of open skin can increase the risk of a post-operative wound infection by allowing bacteria to enter and travel throughout the body.  Notify your surgeon if you have any skin wounds / rashes even if it is not near the expected surgical site.  The area will need assessed to determine if surgery should be delayed until it is healed.  The night prior to surgery shower using a fresh bar of anti-bacterial soap (such as Dial) and clean washcloth.  Sleep in a clean bed with clean clothing.  Do not allow pets to sleep with you.  Shower on the morning of surgery using a fresh bar of anti-bacterial soap (such as Dial) and clean washcloth.  Dry with a clean towel and dress in clean clothing.  Ask your surgeon if you will be receiving antibiotics prior to surgery.  Make sure you, your family, and all healthcare providers clean their hands with soap and water or an alcohol based hand  before caring for you or your wound.    Day of surgery:  Your arrival time is approximately two hours before your scheduled surgery time.  Upon arrival, a Pre-op nurse and Anesthesiologist will review your health history, obtain vital signs, and answer questions you may have.  The only belongings needed at this time will be a list of your home medications and if applicable your C-PAP/BI-PAP machine.  A Pre-op nurse will start an IV and you may receive medication in preparation for surgery, including something to help you relax.     Please be aware that surgery does come with discomfort.  We want to make every effort to control your discomfort so please  discuss any uncontrolled symptoms with your nurse.   Your doctor will most likely have prescribed pain medications.      If you are going home after surgery you will receive individualized written care instructions before being discharged.  A responsible adult must drive you to and from the hospital on the day of your surgery and stay with you for 24 hours.  Discharge prescriptions can be filled by the hospital pharmacy during regular pharmacy hours.  If you are having surgery late in the day/evening your prescription may be e-prescribed to your pharmacy.  Please verify your pharmacy hours or chose a 24 hour pharmacy to avoid not having access to your prescription because your pharmacy has closed for the day.    If you are staying overnight following surgery, you will be transported to your hospital room following the recovery period.  [facility] has all private rooms.    If you have any questions please call Pre-Admission Testing at [phone].  Deductibles and co-payments are collected on the day of service. Please be prepared to pay the required co-pay, deductible or deposit on the day of service as defined by your plan.    Call your surgeon immediately if you experience any of the following symptoms:  Sore Throat  Shortness of Breath or difficulty breathing  Cough  Chills  Body soreness or muscle pain  Headache  Fever  New loss of taste or smell  Do not arrive for your surgery ill.  Your procedure will need to be rescheduled to another time.  You will need to call your physician before the day of surgery to avoid any unnecessary exposure to hospital staff as well as other patients.       CHLORHEXIDINE CLOTH INSTRUCTIONS  The morning of surgery follow these instructions using the Chlorhexidine cloths you've been given.  These steps reduce bacteria on the body.  Do not use the cloths near your eyes, ears mouth, genitalia or on open wounds.  Throw the cloths away after use but do not try to flush  them down a toilet.      Open and remove one cloth at a time from the package.    Leave the cloth unfolded and begin the bathing.  Massage the skin with the cloths using gentle pressure to remove bacteria.  Do not scrub harshly.   Follow the steps below with one 2% CHG cloth per area (6 total cloths).  One cloth for neck, shoulders and chest.  One cloth for both arms, hands, fingers and underarms (do underarms last).  One cloth for the abdomen followed by groin.  One cloth for right leg and foot including between the toes.  One cloth for left leg and foot including between the toes.  The last cloth is to be used for the back of the neck, back and buttocks.    Allow the CHG to air dry 3 minutes on the skin which will give it time to work and decrease the chance of irritation.  The skin may feel sticky until it is dry.  Do not rinse with water or any other liquid or you will lose the beneficial effects of the CHG.  If mild skin irritation occurs, do rinse the skin to remove the CHG.  Report this to the nurse at time of admission.  Do not apply lotions, creams, ointments, deodorants or perfumes after using the clothes. Dress in clean clothes before coming to the hospital.

## 2023-02-07 ENCOUNTER — HOSPITAL ENCOUNTER (OUTPATIENT)
Facility: HOSPITAL | Age: 48
Discharge: HOME | End: 2023-02-07
Attending: OBSTETRICS & GYNECOLOGY | Admitting: OBSTETRICS & GYNECOLOGY
Payer: COMMERCIAL

## 2023-02-07 ENCOUNTER — ANESTHESIA EVENT (OUTPATIENT)
Dept: PERIOP | Facility: HOSPITAL | Age: 48
End: 2023-02-07
Payer: COMMERCIAL

## 2023-02-07 ENCOUNTER — ANESTHESIA (OUTPATIENT)
Dept: PERIOP | Facility: HOSPITAL | Age: 48
End: 2023-02-07
Payer: COMMERCIAL

## 2023-02-07 VITALS
DIASTOLIC BLOOD PRESSURE: 99 MMHG | OXYGEN SATURATION: 95 % | SYSTOLIC BLOOD PRESSURE: 129 MMHG | RESPIRATION RATE: 16 BRPM | HEART RATE: 95 BPM | TEMPERATURE: 98.7 F

## 2023-02-07 DIAGNOSIS — G89.18 PAIN AT SURGICAL SITE: Primary | ICD-10-CM

## 2023-02-07 DIAGNOSIS — J45.41 MODERATE PERSISTENT ASTHMA WITH EXACERBATION: ICD-10-CM

## 2023-02-07 DIAGNOSIS — N94.6 DYSMENORRHEA: ICD-10-CM

## 2023-02-07 DIAGNOSIS — D25.1 INTRAMURAL LEIOMYOMA OF UTERUS: ICD-10-CM

## 2023-02-07 PROCEDURE — 25010000002 ONDANSETRON PER 1 MG: Performed by: NURSE ANESTHETIST, CERTIFIED REGISTERED

## 2023-02-07 PROCEDURE — 25010000002 DEXAMETHASONE SODIUM PHOSPHATE 20 MG/5ML SOLUTION: Performed by: NURSE ANESTHETIST, CERTIFIED REGISTERED

## 2023-02-07 PROCEDURE — 25010000002 PROPOFOL 10 MG/ML EMULSION: Performed by: NURSE ANESTHETIST, CERTIFIED REGISTERED

## 2023-02-07 PROCEDURE — S0260 H&P FOR SURGERY: HCPCS | Performed by: OBSTETRICS & GYNECOLOGY

## 2023-02-07 PROCEDURE — 25010000002 MAGNESIUM SULFATE PER 500 MG OF MAGNESIUM: Performed by: NURSE ANESTHETIST, CERTIFIED REGISTERED

## 2023-02-07 PROCEDURE — 25010000002 NEOSTIGMINE 5 MG/10ML SOLUTION: Performed by: NURSE ANESTHETIST, CERTIFIED REGISTERED

## 2023-02-07 PROCEDURE — 88307 TISSUE EXAM BY PATHOLOGIST: CPT | Performed by: OBSTETRICS & GYNECOLOGY

## 2023-02-07 PROCEDURE — 25010000002 GENTAMICIN PER 80 MG: Performed by: OBSTETRICS & GYNECOLOGY

## 2023-02-07 PROCEDURE — 58571 TLH W/T/O 250 G OR LESS: CPT | Performed by: OBSTETRICS & GYNECOLOGY

## 2023-02-07 PROCEDURE — 25010000002 FENTANYL CITRATE (PF) 50 MCG/ML SOLUTION: Performed by: NURSE ANESTHETIST, CERTIFIED REGISTERED

## 2023-02-07 PROCEDURE — 25010000002 MIDAZOLAM PER 1 MG: Performed by: STUDENT IN AN ORGANIZED HEALTH CARE EDUCATION/TRAINING PROGRAM

## 2023-02-07 DEVICE — ABSORBABLE RELOAD
Type: IMPLANTABLE DEVICE | Site: ABDOMEN | Status: FUNCTIONAL
Brand: V-LOC 180

## 2023-02-07 RX ORDER — DIPHENHYDRAMINE HYDROCHLORIDE 50 MG/ML
12.5 INJECTION INTRAMUSCULAR; INTRAVENOUS
Status: DISCONTINUED | OUTPATIENT
Start: 2023-02-07 | End: 2023-02-07 | Stop reason: HOSPADM

## 2023-02-07 RX ORDER — MAGNESIUM SULFATE HEPTAHYDRATE 500 MG/ML
INJECTION, SOLUTION INTRAMUSCULAR; INTRAVENOUS AS NEEDED
Status: DISCONTINUED | OUTPATIENT
Start: 2023-02-07 | End: 2023-02-07 | Stop reason: SURG

## 2023-02-07 RX ORDER — SODIUM CHLORIDE 0.9 % (FLUSH) 0.9 %
10 SYRINGE (ML) INJECTION AS NEEDED
Status: DISCONTINUED | OUTPATIENT
Start: 2023-02-07 | End: 2023-02-07 | Stop reason: HOSPADM

## 2023-02-07 RX ORDER — PROMETHAZINE HYDROCHLORIDE 25 MG/1
25 SUPPOSITORY RECTAL ONCE AS NEEDED
Status: DISCONTINUED | OUTPATIENT
Start: 2023-02-07 | End: 2023-02-07 | Stop reason: HOSPADM

## 2023-02-07 RX ORDER — NALOXONE HCL 0.4 MG/ML
0.2 VIAL (ML) INJECTION AS NEEDED
Status: DISCONTINUED | OUTPATIENT
Start: 2023-02-07 | End: 2023-02-07 | Stop reason: HOSPADM

## 2023-02-07 RX ORDER — FENTANYL CITRATE 0.05 MG/ML
INJECTION, SOLUTION INTRAMUSCULAR; INTRAVENOUS AS NEEDED
Status: DISCONTINUED | OUTPATIENT
Start: 2023-02-07 | End: 2023-02-07 | Stop reason: SURG

## 2023-02-07 RX ORDER — SODIUM CHLORIDE 0.9 % (FLUSH) 0.9 %
10 SYRINGE (ML) INJECTION EVERY 12 HOURS SCHEDULED
Status: DISCONTINUED | OUTPATIENT
Start: 2023-02-07 | End: 2023-02-07 | Stop reason: HOSPADM

## 2023-02-07 RX ORDER — NEOSTIGMINE METHYLSULFATE 0.5 MG/ML
INJECTION, SOLUTION INTRAVENOUS AS NEEDED
Status: DISCONTINUED | OUTPATIENT
Start: 2023-02-07 | End: 2023-02-07 | Stop reason: SURG

## 2023-02-07 RX ORDER — SODIUM CHLORIDE 9 MG/ML
INJECTION, SOLUTION INTRAVENOUS AS NEEDED
Status: DISCONTINUED | OUTPATIENT
Start: 2023-02-07 | End: 2023-02-07 | Stop reason: HOSPADM

## 2023-02-07 RX ORDER — LABETALOL HYDROCHLORIDE 5 MG/ML
5 INJECTION, SOLUTION INTRAVENOUS
Status: DISCONTINUED | OUTPATIENT
Start: 2023-02-07 | End: 2023-02-07 | Stop reason: HOSPADM

## 2023-02-07 RX ORDER — MAGNESIUM HYDROXIDE 1200 MG/15ML
LIQUID ORAL AS NEEDED
Status: DISCONTINUED | OUTPATIENT
Start: 2023-02-07 | End: 2023-02-07 | Stop reason: HOSPADM

## 2023-02-07 RX ORDER — DIPHENHYDRAMINE HCL 25 MG
25 CAPSULE ORAL
Status: DISCONTINUED | OUTPATIENT
Start: 2023-02-07 | End: 2023-02-07 | Stop reason: HOSPADM

## 2023-02-07 RX ORDER — HYDRALAZINE HYDROCHLORIDE 20 MG/ML
5 INJECTION INTRAMUSCULAR; INTRAVENOUS
Status: DISCONTINUED | OUTPATIENT
Start: 2023-02-07 | End: 2023-02-07 | Stop reason: HOSPADM

## 2023-02-07 RX ORDER — OXYCODONE AND ACETAMINOPHEN 7.5; 325 MG/1; MG/1
1 TABLET ORAL EVERY 4 HOURS PRN
Status: DISCONTINUED | OUTPATIENT
Start: 2023-02-07 | End: 2023-02-07 | Stop reason: HOSPADM

## 2023-02-07 RX ORDER — SODIUM CHLORIDE, SODIUM LACTATE, POTASSIUM CHLORIDE, CALCIUM CHLORIDE 600; 310; 30; 20 MG/100ML; MG/100ML; MG/100ML; MG/100ML
9 INJECTION, SOLUTION INTRAVENOUS CONTINUOUS
Status: DISCONTINUED | OUTPATIENT
Start: 2023-02-07 | End: 2023-02-07 | Stop reason: HOSPADM

## 2023-02-07 RX ORDER — ONDANSETRON 2 MG/ML
4 INJECTION INTRAMUSCULAR; INTRAVENOUS ONCE AS NEEDED
Status: COMPLETED | OUTPATIENT
Start: 2023-02-07 | End: 2023-02-07

## 2023-02-07 RX ORDER — ROCURONIUM BROMIDE 10 MG/ML
INJECTION, SOLUTION INTRAVENOUS AS NEEDED
Status: DISCONTINUED | OUTPATIENT
Start: 2023-02-07 | End: 2023-02-07 | Stop reason: SURG

## 2023-02-07 RX ORDER — LIDOCAINE HYDROCHLORIDE 10 MG/ML
0.5 INJECTION, SOLUTION EPIDURAL; INFILTRATION; INTRACAUDAL; PERINEURAL ONCE AS NEEDED
Status: DISCONTINUED | OUTPATIENT
Start: 2023-02-07 | End: 2023-02-07 | Stop reason: HOSPADM

## 2023-02-07 RX ORDER — IBUPROFEN 600 MG/1
600 TABLET ORAL EVERY 6 HOURS PRN
Qty: 30 TABLET | Refills: 0 | Status: SHIPPED | OUTPATIENT
Start: 2023-02-07 | End: 2023-02-21

## 2023-02-07 RX ORDER — SODIUM CHLORIDE 9 MG/ML
40 INJECTION, SOLUTION INTRAVENOUS AS NEEDED
Status: DISCONTINUED | OUTPATIENT
Start: 2023-02-07 | End: 2023-02-07 | Stop reason: HOSPADM

## 2023-02-07 RX ORDER — OXYCODONE HYDROCHLORIDE AND ACETAMINOPHEN 5; 325 MG/1; MG/1
1-2 TABLET ORAL EVERY 4 HOURS PRN
Qty: 25 TABLET | Refills: 0 | Status: SHIPPED | OUTPATIENT
Start: 2023-02-07 | End: 2023-02-21

## 2023-02-07 RX ORDER — GABAPENTIN 300 MG/1
600 CAPSULE ORAL ONCE
Status: COMPLETED | OUTPATIENT
Start: 2023-02-07 | End: 2023-02-07

## 2023-02-07 RX ORDER — ACETAMINOPHEN 500 MG
1000 TABLET ORAL ONCE
Status: COMPLETED | OUTPATIENT
Start: 2023-02-07 | End: 2023-02-07

## 2023-02-07 RX ORDER — SCOLOPAMINE TRANSDERMAL SYSTEM 1 MG/1
1 PATCH, EXTENDED RELEASE TRANSDERMAL CONTINUOUS
Status: DISCONTINUED | OUTPATIENT
Start: 2023-02-07 | End: 2023-02-07 | Stop reason: HOSPADM

## 2023-02-07 RX ORDER — HYDROMORPHONE HYDROCHLORIDE 1 MG/ML
0.5 INJECTION, SOLUTION INTRAMUSCULAR; INTRAVENOUS; SUBCUTANEOUS
Status: DISCONTINUED | OUTPATIENT
Start: 2023-02-07 | End: 2023-02-07 | Stop reason: HOSPADM

## 2023-02-07 RX ORDER — MIDAZOLAM HYDROCHLORIDE 1 MG/ML
1 INJECTION INTRAMUSCULAR; INTRAVENOUS
Status: COMPLETED | OUTPATIENT
Start: 2023-02-07 | End: 2023-02-07

## 2023-02-07 RX ORDER — PROMETHAZINE HYDROCHLORIDE 25 MG/1
25 TABLET ORAL ONCE AS NEEDED
Status: DISCONTINUED | OUTPATIENT
Start: 2023-02-07 | End: 2023-02-07 | Stop reason: HOSPADM

## 2023-02-07 RX ORDER — HYDROCODONE BITARTRATE AND ACETAMINOPHEN 7.5; 325 MG/1; MG/1
1 TABLET ORAL ONCE AS NEEDED
Status: COMPLETED | OUTPATIENT
Start: 2023-02-07 | End: 2023-02-07

## 2023-02-07 RX ORDER — CLINDAMYCIN PHOSPHATE 900 MG/50ML
900 INJECTION INTRAVENOUS ONCE
Status: COMPLETED | OUTPATIENT
Start: 2023-02-07 | End: 2023-02-07

## 2023-02-07 RX ORDER — ONDANSETRON 2 MG/ML
INJECTION INTRAMUSCULAR; INTRAVENOUS AS NEEDED
Status: DISCONTINUED | OUTPATIENT
Start: 2023-02-07 | End: 2023-02-07 | Stop reason: SURG

## 2023-02-07 RX ORDER — PROPOFOL 10 MG/ML
VIAL (ML) INTRAVENOUS AS NEEDED
Status: DISCONTINUED | OUTPATIENT
Start: 2023-02-07 | End: 2023-02-07 | Stop reason: SURG

## 2023-02-07 RX ORDER — SODIUM CHLORIDE 0.9 % (FLUSH) 0.9 %
3 SYRINGE (ML) INJECTION EVERY 12 HOURS SCHEDULED
Status: DISCONTINUED | OUTPATIENT
Start: 2023-02-07 | End: 2023-02-07 | Stop reason: HOSPADM

## 2023-02-07 RX ORDER — EPHEDRINE SULFATE 50 MG/ML
5 INJECTION, SOLUTION INTRAVENOUS ONCE AS NEEDED
Status: DISCONTINUED | OUTPATIENT
Start: 2023-02-07 | End: 2023-02-07 | Stop reason: HOSPADM

## 2023-02-07 RX ORDER — BUPIVACAINE HYDROCHLORIDE AND EPINEPHRINE 2.5; 5 MG/ML; UG/ML
INJECTION, SOLUTION EPIDURAL; INFILTRATION; INTRACAUDAL; PERINEURAL AS NEEDED
Status: DISCONTINUED | OUTPATIENT
Start: 2023-02-07 | End: 2023-02-07 | Stop reason: HOSPADM

## 2023-02-07 RX ORDER — GLYCOPYRROLATE 0.2 MG/ML
INJECTION INTRAMUSCULAR; INTRAVENOUS AS NEEDED
Status: DISCONTINUED | OUTPATIENT
Start: 2023-02-07 | End: 2023-02-07 | Stop reason: SURG

## 2023-02-07 RX ORDER — FENTANYL CITRATE 50 UG/ML
50 INJECTION, SOLUTION INTRAMUSCULAR; INTRAVENOUS
Status: DISCONTINUED | OUTPATIENT
Start: 2023-02-07 | End: 2023-02-07 | Stop reason: HOSPADM

## 2023-02-07 RX ORDER — SODIUM CHLORIDE 0.9 % (FLUSH) 0.9 %
3-10 SYRINGE (ML) INJECTION AS NEEDED
Status: DISCONTINUED | OUTPATIENT
Start: 2023-02-07 | End: 2023-02-07 | Stop reason: HOSPADM

## 2023-02-07 RX ORDER — LIDOCAINE HYDROCHLORIDE AND EPINEPHRINE 10; 10 MG/ML; UG/ML
INJECTION, SOLUTION INFILTRATION; PERINEURAL AS NEEDED
Status: DISCONTINUED | OUTPATIENT
Start: 2023-02-07 | End: 2023-02-07 | Stop reason: HOSPADM

## 2023-02-07 RX ORDER — FLUMAZENIL 0.1 MG/ML
0.2 INJECTION INTRAVENOUS AS NEEDED
Status: DISCONTINUED | OUTPATIENT
Start: 2023-02-07 | End: 2023-02-07 | Stop reason: HOSPADM

## 2023-02-07 RX ORDER — OXYCODONE HYDROCHLORIDE AND ACETAMINOPHEN 5; 325 MG/1; MG/1
1 TABLET ORAL ONCE AS NEEDED
Status: DISCONTINUED | OUTPATIENT
Start: 2023-02-07 | End: 2023-02-07 | Stop reason: HOSPADM

## 2023-02-07 RX ORDER — LIDOCAINE HYDROCHLORIDE 20 MG/ML
INJECTION, SOLUTION INFILTRATION; PERINEURAL AS NEEDED
Status: DISCONTINUED | OUTPATIENT
Start: 2023-02-07 | End: 2023-02-07 | Stop reason: SURG

## 2023-02-07 RX ORDER — DEXAMETHASONE SODIUM PHOSPHATE 4 MG/ML
INJECTION, SOLUTION INTRA-ARTICULAR; INTRALESIONAL; INTRAMUSCULAR; INTRAVENOUS; SOFT TISSUE AS NEEDED
Status: DISCONTINUED | OUTPATIENT
Start: 2023-02-07 | End: 2023-02-07 | Stop reason: SURG

## 2023-02-07 RX ADMIN — SODIUM CHLORIDE, POTASSIUM CHLORIDE, SODIUM LACTATE AND CALCIUM CHLORIDE: 600; 310; 30; 20 INJECTION, SOLUTION INTRAVENOUS at 10:59

## 2023-02-07 RX ADMIN — ACETAMINOPHEN 1000 MG: 500 TABLET ORAL at 07:42

## 2023-02-07 RX ADMIN — FENTANYL CITRATE 50 MCG: 50 INJECTION INTRAMUSCULAR; INTRAVENOUS at 09:01

## 2023-02-07 RX ADMIN — MIDAZOLAM 1 MG: 1 INJECTION INTRAMUSCULAR; INTRAVENOUS at 07:42

## 2023-02-07 RX ADMIN — PROPOFOL 50 MG: 10 INJECTION, EMULSION INTRAVENOUS at 08:37

## 2023-02-07 RX ADMIN — ROCURONIUM BROMIDE 50 MG: 10 INJECTION, SOLUTION INTRAVENOUS at 08:34

## 2023-02-07 RX ADMIN — FENTANYL CITRATE 50 MCG: 50 INJECTION INTRAMUSCULAR; INTRAVENOUS at 10:53

## 2023-02-07 RX ADMIN — PROPOFOL 150 MG: 10 INJECTION, EMULSION INTRAVENOUS at 08:33

## 2023-02-07 RX ADMIN — ONDANSETRON 4 MG: 2 INJECTION INTRAMUSCULAR; INTRAVENOUS at 13:32

## 2023-02-07 RX ADMIN — GABAPENTIN 600 MG: 300 CAPSULE ORAL at 07:42

## 2023-02-07 RX ADMIN — MIDAZOLAM 1 MG: 1 INJECTION INTRAMUSCULAR; INTRAVENOUS at 07:57

## 2023-02-07 RX ADMIN — GLYCOPYRROLATE 0.4 MG: 1 INJECTION INTRAMUSCULAR; INTRAVENOUS at 10:58

## 2023-02-07 RX ADMIN — ONDANSETRON 4 MG: 2 INJECTION INTRAMUSCULAR; INTRAVENOUS at 10:38

## 2023-02-07 RX ADMIN — HYDROCODONE BITARTRATE AND ACETAMINOPHEN 1 TABLET: 7.5; 325 TABLET ORAL at 13:32

## 2023-02-07 RX ADMIN — LIDOCAINE HYDROCHLORIDE 80 MG: 20 INJECTION, SOLUTION INFILTRATION; PERINEURAL at 08:33

## 2023-02-07 RX ADMIN — GENTAMICIN SULFATE 340 MG: 40 INJECTION, SOLUTION INTRAMUSCULAR; INTRAVENOUS at 08:21

## 2023-02-07 RX ADMIN — PROPOFOL 25 MCG/KG/MIN: 10 INJECTION, EMULSION INTRAVENOUS at 08:43

## 2023-02-07 RX ADMIN — SCOPALAMINE 1 PATCH: 1 PATCH, EXTENDED RELEASE TRANSDERMAL at 07:42

## 2023-02-07 RX ADMIN — CLINDAMYCIN PHOSPHATE 900 MG: 900 INJECTION, SOLUTION INTRAVENOUS at 08:21

## 2023-02-07 RX ADMIN — NEOSTIGMINE METHYLSULFATE 3 MG: 0.5 INJECTION INTRAVENOUS at 10:58

## 2023-02-07 RX ADMIN — MAGNESIUM SULFATE HEPTAHYDRATE 2 G: 500 INJECTION, SOLUTION INTRAMUSCULAR; INTRAVENOUS at 09:03

## 2023-02-07 RX ADMIN — FENTANYL CITRATE 50 MCG: 50 INJECTION INTRAMUSCULAR; INTRAVENOUS at 08:33

## 2023-02-07 RX ADMIN — SODIUM CHLORIDE, POTASSIUM CHLORIDE, SODIUM LACTATE AND CALCIUM CHLORIDE 9 ML/HR: 600; 310; 30; 20 INJECTION, SOLUTION INTRAVENOUS at 08:23

## 2023-02-07 RX ADMIN — DEXAMETHASONE SODIUM PHOSPHATE 10 MG: 4 INJECTION, SOLUTION INTRAMUSCULAR; INTRAVENOUS at 08:42

## 2023-02-07 NOTE — OP NOTE
TOTAL LAPAROSCOPIC HYSTERECTOMY  Procedure Report    Patient Name:  Kenyetta Montemayor  YOB: 1975    Date of Surgery:  2/7/2023     Indications:  Dysmenorrhea , fibroids    Pre-op Diagnosis:   Intramural leiomyoma of uterus [D25.1]  Dysmenorrhea [N94.6]       Post-Op Diagnosis Codes:     * Intramural leiomyoma of uterus [D25.1]     * Dysmenorrhea [N94.6]    Procedure/CPT® Codes:      Procedure(s):  TOTAL LAPAROSCOPIC HYSTERECTOMY and bilateral salpingectomy        Staff:  Surgeon(s):  Onelia Tuttle MD Nusz, Jean, MD         Anesthesia: General    Estimated Blood Loss: 50 mL    Implants:    Implant Name Type Inv. Item Serial No.  Lot No. LRB No. Used Action   RELOAD SUT ENDOSTITCH [id number] ABS SZ2/0 20CM GRN - [id number] Implant RELOAD SUT ENDOSTITCH [id number] ABS SZ2/0 20CM GRN  betNOW [id number]  1 Implanted       Specimen:          Specimens     ID Source Type Tests Collected By Collected At Frozen?    A Uterus, Cervix, Bilateral Fallopian Tubes  Tissue · TISSUE PATHOLOGY EXAM   Onelia Tuttle MD 2/7/23 1038 No    Description: UTERUS, CERVIX, BILATERAL FALLOPIAN TUBES    This specimen was not marked as sent.              Findings: multiple fibroids on uterus, ovaries and tubes appear normal, bowel, liver and gallbladder appear normal, no endometriosis seen    Complications: none    Description of Procedure:          Procedure: Patient was taken to the operating room where anesthesia was undertaken. She was then placed in dorsal lithotomy position and placed in Edmund stirrups. She was prepped and draped in the usual sterile fashion. Yu catheter was placed. A speculum was placed in the vagina to visualize the cervix and the cervix was then grasped with a single tooth tenaculum and pulled into view. The cervix was dilated to place a  uterine manipulator.  Attention was then turned to the abdomen where a small incision was made below the umbilicus.  The Optivue 5 mm trocar was  used to access the abdomen.  The abdomen was insufflated with CO2 gas.  5 mm ports were placed in the right and 10 mm in the left quadrant under direct visualization.  The pelvis was surveyed with the findings noted above.  The harmonic scalpel was used to take the tube on the left, the broad ligament and round ligament and across the anterior leaf across the cervix taking the bladder down.  The same procedure was performed on the right.  The uterine arteries were skeletonized bilaterally and the harmonic scalpel was used to ligate them.  Anterior colpotomy was made and extended around the cuff with the harmonic.  The uterine manipulator was not in ideal position due to small cervix and prior ablation so attention was turned to the vaginal approach to finish the cuff.     Weighted speculum placed in vagina and manipulator removed.  The cervix was grasped and injected with 1% lidocaine with epinephrine and incision was made circumferentially with knife.  The remaining pedicles were clamped and tied off and the uterus was then removed vaginally.  The vaginal cuff was closed using a 0 vicryl suture in running locking fashion vertically.      Attention was turned back to laparoscopic approach were vaginal cuff was inspected and found to be hemostatic.   Right and left ureter were visualized with peristalsis well away from pedicles.  Abundant clear urine in catheter.     The trocars were removed under direct visualization.  The CO2 gas was let out of the abdomen and last trocar was removed.  Local marcaine was used around the incisions and skin was closed with 4-0 vicryl suture subcutaneously. Sponge, lap and needle counts were correct at the end of the procedure. Yu catheter removed. The patient was taken to the PACU in stable condition.    Dr Duncan   was responsible for performing the following activities: Retraction, Suction, Irrigation and Held/Positioned Camera and their skilled assistance was necessary for the  success of this case.        Onelia Tuttle MD     Date: 2/7/2023  Time: 11:15 EST

## 2023-02-07 NOTE — ANESTHESIA POSTPROCEDURE EVALUATION
Patient: Kenyetta Montemayor    Procedure Summary     Date: 02/07/23 Room / Location: [facility] OR  / [facility] MAIN OR    Anesthesia Start: 0827 Anesthesia Stop: 1123    Procedure: TOTAL LAPAROSCOPIC HYSTERECTOMY and bilateral salpingectomy (Bilateral: Abdomen) Diagnosis:       Intramural leiomyoma of uterus      Dysmenorrhea      (Intramural leiomyoma of uterus [D25.1])      (Dysmenorrhea [N94.6])    Surgeons: Onelia Tuttle MD Provider: Solis Becerra MD    Anesthesia Type: general ASA Status: 2          Anesthesia Type: general    Vitals  Vitals Value Taken Time   /90 02/07/23 1316   Temp 37.1 °C (98.7 °F) 02/07/23 1119   Pulse 91 02/07/23 1318   Resp 16 02/07/23 1315   SpO2 92 % 02/07/23 1319   Vitals shown include unvalidated device data.        Post Anesthesia Care and Evaluation    Patient location during evaluation: bedside  Patient participation: complete - patient participated  Level of consciousness: awake and alert  Pain management: adequate    Airway patency: patent  Anesthetic complications: No anesthetic complications  PONV Status: controlled  Cardiovascular status: blood pressure returned to baseline and acceptable  Respiratory status: acceptable  Hydration status: acceptable

## 2023-02-07 NOTE — H&P
Patient Care Team:  Jorge Luis White MD as PCP - General  Jorge Luis White MD Allison, Wes A, MD as Consulting Physician (Otolaryngology)    Chief complaint dysmenorrhea and fibroid uterus    Subjective     Patient is a 47 y.o. female presents with dysmenorrhea and fibroid uterus for hysterectomy.  She had an ablation 3 years ago and bleeding is light now but still with severe cramping and fibroids are growing on US.  Pt desires definitive management with hysterectomy.  RBA of TLH/ bilateral salpingectomy reviewed.  Ovarian conservation is planned.     Review of Systems   Pertinent items are noted in HPI, all other systems reviewed and negative    History  Past Medical History:   Diagnosis Date   • Allergic     many seasonal/indoor   • Anesthesia     VERY AGITATED WHEN WAKING UP AFTER NASAL SURGERY,  ALSO WOKE UP DURING REMOVAL OF WISDOM TEETH   • Anxiety    • Asthma 2002?   • At risk for sleep apnea    • Atypical squamous cells of undetermined significance (ASCUS) on Papanicolaou smear of cervix    • Constipation    • COVID    • Depression 2015/2016    very sad after my mom was diagnosed with brain cancer   • Fibroid 2019, maybe   • Frequent urination    • GERD (gastroesophageal reflux disease)     sometimes   • H/O dysmenorrhea    • H/O infertility    • History of migraine    • Hypertension 2020   • Pelvic pain in female    • Pelvic prolapse    • PONV (postoperative nausea and vomiting) Feb 2016    Nauseous and hard to wake after deviated septum repair   • Scoliosis     very mild   • Urinary urgency      Past Surgical History:   Procedure Laterality Date   • BRAVO PROCEDURE N/A 08/11/2022    Procedure: ESOPHAGOGASTRODUODENOSCOPY WITH BRAVO #6 PLACEMENT;  Surgeon: Roland Chaudhry MD;  Location: [facility] ENDOSCOPY;  Service: Gastroenterology;  Laterality: N/A;  PRE- GERD  POST- NORMAL   • COLONOSCOPY N/A 08/11/2022    Procedure: COLONOSCOPY TO CECUM;  Surgeon: Roland Chaudhry MD;  Location: [facility]  ENDOSCOPY;  Service: Gastroenterology;  Laterality: N/A;  PRE- SCREENING  POST- HEMORRHOIDS, DIVERTICULOSIS   • D & C HYSTEROSCOPY ENDOMETRIAL ABLATION N/A 02/03/2020    Procedure: DILATATION AND CURETTAGE HYSTEROSCOPY NOVASURE ENDOMETRIAL ABLATION;  Surgeon: Onelia Tuttle MD;  Location: [facility] OR;  Service: Obstetrics/Gynecology;  Laterality: N/A;   • DILATATION AND CURETTAGE  2/2020    As part of endometrial ablation   • ENDOMETRIAL ABLATION  2/2020   • SEPTOPLASTY, RESECTION INFERIOR TURBINATES  02/2016   • UPPER GASTROINTESTINAL ENDOSCOPY  8/11/2022   • WISDOM TOOTH EXTRACTION  1990     Family History   Problem Relation Age of Onset   • Hypertension Mother    • Breast cancer Mother         Late 40s   • Thyroid disease Mother    • Cancer Mother         breast, thyroid, brain   • Hyperlipidemia Mother    • Colon polyps Mother    • Prostate cancer Father         Twice   • Cancer Father         prostate, skin   • Breast cancer Paternal Aunt    • Diabetes Maternal Grandmother    • Hyperlipidemia Maternal Grandmother    • Hypertension Maternal Grandmother    • Coronary artery disease Maternal Grandmother    • Colon cancer Other    • Arthritis Paternal Grandmother    • Stroke Paternal Grandmother         mini stroke   • Cancer Paternal Aunt         breast   • Breast cancer Paternal Aunt    • Depression Maternal Aunt         bipolar   • Heart disease Paternal Grandfather    • Hyperlipidemia Paternal Grandfather    • Hypertension Paternal Grandfather    • Coronary artery disease Paternal Grandfather    • Mental illness Maternal Aunt         bipolar   • Depression Maternal Aunt         bipolar   • No Known Problems Sister    • No Known Problems Brother    • BRCA 1/2 Neg Hx    • Endometrial cancer Neg Hx    • Ovarian cancer Neg Hx    • Malig Hyperthermia Neg Hx      Social History     Tobacco Use   • Smoking status: Never   • Smokeless tobacco: Never   Vaping Use   • Vaping Use: Never used   Substance Use Topics    • Alcohol use: Yes     Alcohol/week: 7.0 - 16.0 standard drinks     Types: 7 - 14 Drinks containing 0.5 oz of alcohol per week     Comment: 1-2/day MIXED DRINKS   • Drug use: Never     Medications Prior to Admission   Medication Sig Dispense Refill Last Dose   • albuterol sulfate  (90 Base) MCG/ACT inhaler Inhale 2 puffs Every 4 (Four) Hours As Needed for Wheezing. 6.7 g 3 Past Week   • azelastine (ASTELIN) 0.1 % nasal spray 2 sprays into the nostril(s) as directed by provider Every Morning. Use in each nostril as directed 3 each 3 2/6/2023 at 1000   • budesonide (PULMICORT) 180 MCG/ACT inhaler Inhale 1 puff 2 (Two) Times a Day. (Patient taking differently: Inhale 1 puff Daily.) 3 each 2 2/7/2023 at 0450   • cetirizine (zyrTEC) 10 MG tablet Take 10 mg by mouth Daily.   2/6/2023 at 1000   • Chlorhexidine Gluconate 2 % pads Apply  topically. AS DIRECTED PAT   2/7/2023 at 0545   • diazePAM (VALIUM) 5 MG tablet TAKE 1 TABLET BY MOUTH EVERY 8 HOURS AS NEEDED FOR ANXIETY (Patient taking differently: Take 5 mg by mouth Every 6 (Six) Hours As Needed.) 20 tablet 0 2/6/2023 at 1900   • esomeprazole (nexIUM) 40 MG capsule Take 1 capsule by mouth 2 (Two) Times a Day. 180 capsule 3 2/6/2023 at 0450   • FLUoxetine (PROzac) 20 MG capsule Take 1 capsule by mouth Daily. 90 capsule 3 2/7/2023 at 0450   • lisinopril (PRINIVIL,ZESTRIL) 20 MG tablet Take 1 tablet by mouth Daily. 90 tablet 3 2/6/2023 at 1000   • montelukast (SINGULAIR) 10 MG tablet TAKE 1 TABLET BY MOUTH AT  NIGHT (Patient taking differently: Take 10 mg by mouth Daily.) 90 tablet 3 2/6/2023 at 1000   • Triamcinolone Acetonide (NASACORT) 55 MCG/ACT nasal inhaler 1-2 sprays into the nostril(s) as directed by provider Every Morning.   2/6/2023 at 1000   • Wheat Dextrin (BENEFIBER PO) Take  by mouth Daily. 2 tbsp daily   2/6/2023 at 1000   • multivitamin with minerals tablet tablet Take 1 tablet by mouth Daily. HOLD FOR SURGERY   2/3/2023     Allergies:  Miihr  [cefaclor]    Objective     Vital Signs  Temp:  [98.7 °F (37.1 °C)] 98.7 °F (37.1 °C)  Heart Rate:  [80] 80  Resp:  [16] 16  BP: (139)/(99) 139/99    Physical Exam:    General Appearance: alert, appears stated age and cooperative  Neck: no adenopathy, supple, trachea midline and no thyromegaly  Lungs: respirations regular, respirations even and respirations unlabored  Heart: regular rhythm & normal rate  Abdomen: normal bowel sounds, no masses, soft non-tender, no guarding and no rebound tenderness  Pelvic: cervix normal in appearance, external genitalia normal, no adnexal masses or tenderness and uterus mildly enlarged, RV  Extremities: moves extremities well, no edema, no cyanosis and no redness  Neurologic: Mental Status orientated to person, place, time and situation, Speech normal content and execusion  Psych: normal    Results Review:    I reviewed the patient's new clinical results.    Assessment & Plan       Intramural leiomyoma of uterus    Dysmenorrhea    Total laparoscopic hysterectomy and bilateral salpingectomy    I discussed the patients findings and my recommendations with patient.     Onelia Tuttle MD  02/07/23  07:20 EST    Time:

## 2023-02-07 NOTE — ANESTHESIA PROCEDURE NOTES
Airway  Urgency: elective    Date/Time: 2/7/2023 8:36 AM  Airway not difficult    General Information and Staff    Patient location during procedure: OR  CRNA/CAA: Tayla Iraheta CRNA    Indications and Patient Condition    Preoxygenated: yes  Mask difficulty assessment: 1 - vent by mask    Final Airway Details  Final airway type: endotracheal airway      Successful airway: ETT  Cuffed: yes   Successful intubation technique: direct laryngoscopy  Endotracheal tube insertion site: oral  Blade: Sergio  Blade size: 3  ETT size (mm): 7.0  Cormack-Lehane Classification: grade I - full view of glottis  Placement verified by: chest auscultation and capnometry   Measured from: teeth  ETT/EBT  to teeth (cm): 21  Number of attempts at approach: 1  Assessment: lips, teeth, and gum same as pre-op and atraumatic intubation    Additional Comments  Teeth, tongue, lips, and gums in preop condition. VSS throughout. Easy mask/smooth easy airway. Tube visualized through cords.

## 2023-02-07 NOTE — ANESTHESIA PREPROCEDURE EVALUATION
Anesthesia Evaluation     Patient summary reviewed and Nursing notes reviewed   history of anesthetic complications: PONV  NPO Solid Status: > 8 hours  NPO Liquid Status: > 2 hours           Airway   Mallampati: II  TM distance: >3 FB  Neck ROM: full  Dental      Pulmonary    (+) asthma,  Cardiovascular     (+) hypertension, hyperlipidemia,       Neuro/Psych  GI/Hepatic/Renal/Endo    (+) obesity,  GERD,      Musculoskeletal     Abdominal    Substance History      OB/GYN          Other                        Anesthesia Plan    ASA 2     general     (I have reviewed the patient's history with the patient and the chart, including all pertinent laboratory results and imaging. I have explained the risks of anesthesia including but not limited to dental damage, corneal abrasion, nerve injury, MI, stroke, and death. Questions asked and answered. Anesthetic plan discussed with patient and team as indicated. Patient expressed understanding of the above.  )  intravenous induction     Anesthetic plan, risks, benefits, and alternatives have been provided, discussed and informed consent has been obtained with: patient.        CODE STATUS:

## 2023-02-09 LAB
LAB AP CASE REPORT: NORMAL
PATH REPORT.FINAL DX SPEC: NORMAL
PATH REPORT.GROSS SPEC: NORMAL

## 2023-02-13 ENCOUNTER — TELEPHONE (OUTPATIENT)
Dept: OBSTETRICS AND GYNECOLOGY | Age: 48
End: 2023-02-13
Payer: COMMERCIAL

## 2023-02-13 NOTE — TELEPHONE ENCOUNTER
Dr Tuttle pt calls stating she had a TLH  02/07 and is asking for more percocet. Pt states during the day her pain has been tolerable with ibuprofen but she has been unable to sleep at night due to the discomfort, her pain is a  3/4 on a pain scale of 1-10 with 10 being the worst. Please advise if you will consider giving pt a refill of medication, 5mg tablet prescribed 02/07 never refilled

## 2023-02-14 DIAGNOSIS — G89.18 PAIN FOLLOWING SURGERY OR PROCEDURE: Primary | ICD-10-CM

## 2023-02-14 RX ORDER — OXYCODONE HYDROCHLORIDE AND ACETAMINOPHEN 5; 325 MG/1; MG/1
1 TABLET ORAL EVERY 4 HOURS PRN
Qty: 10 TABLET | Refills: 0 | Status: SHIPPED | OUTPATIENT
Start: 2023-02-14 | End: 2023-02-21

## 2023-02-14 NOTE — TELEPHONE ENCOUNTER
Let pt know I refilled.  Sorry it was not done yesterday.  Looks like message came in at the end of the day.

## 2023-02-20 DIAGNOSIS — J45.41 MODERATE PERSISTENT ASTHMA WITH EXACERBATION: ICD-10-CM

## 2023-02-21 ENCOUNTER — OFFICE VISIT (OUTPATIENT)
Dept: OBSTETRICS AND GYNECOLOGY | Age: 48
End: 2023-02-21
Payer: COMMERCIAL

## 2023-02-21 VITALS
WEIGHT: 212 LBS | HEIGHT: 62 IN | DIASTOLIC BLOOD PRESSURE: 84 MMHG | SYSTOLIC BLOOD PRESSURE: 148 MMHG | BODY MASS INDEX: 39.01 KG/M2

## 2023-02-21 DIAGNOSIS — Z90.710 STATUS POST HYSTERECTOMY: Primary | ICD-10-CM

## 2023-02-21 PROCEDURE — 99024 POSTOP FOLLOW-UP VISIT: CPT | Performed by: OBSTETRICS & GYNECOLOGY

## 2023-02-21 RX ORDER — OMEGA-3 FATTY ACIDS/FISH OIL 300-1000MG
CAPSULE ORAL
COMMUNITY
Start: 2023-02-18

## 2023-02-21 RX ORDER — BUDESONIDE 180 UG/1
AEROSOL, POWDER RESPIRATORY (INHALATION)
Qty: 2 EACH | Refills: 3 | Status: SHIPPED | OUTPATIENT
Start: 2023-02-21

## 2023-02-21 NOTE — PROGRESS NOTES
GYN Visit    2023    Patient: Kenyetta Montemayor          MR#:[id number]      Chief Complaint   Patient presents with   • Follow-up     2 wk post op TLH, no complaints today        History of Present Illness    47 y.o. female  who presents for  Post op TLH  Doing well  Reviewed benign pathology  Will be more active, no complaints        Patient's last menstrual period was 2022.    ________________________________________  Patient Active Problem List   Diagnosis   • Family history of breast cancer in mother   • Asthma   • H/O dysmenorrhea   • Anxiety   • Menorrhagia with regular cycle   • Essential hypertension, benign   • Chronic cough   • Heartburn   • Epigastric pain   • Family history of polyps in the colon   • Screening for colon cancer   • Mixed hyperlipidemia   • Acute non-recurrent maxillary sinusitis   • Intramural leiomyoma of uterus   • Dysmenorrhea       Past Medical History:   Diagnosis Date   • Allergic     many seasonal/indoor   • Anesthesia     VERY AGITATED WHEN WAKING UP AFTER NASAL SURGERY,  ALSO WOKE UP DURING REMOVAL OF WISDOM TEETH   • Anxiety    • Asthma ?   • At risk for sleep apnea    • Atypical squamous cells of undetermined significance (ASCUS) on Papanicolaou smear of cervix    • Constipation    • COVID    • Depression     very sad after my mom was diagnosed with brain cancer   • Fibroid 2019, maybe   • Frequent urination    • GERD (gastroesophageal reflux disease)     sometimes   • H/O dysmenorrhea    • H/O infertility    • History of migraine    • Hypertension    • Pelvic pain in female    • Pelvic prolapse    • PONV (postoperative nausea and vomiting) 2016    Nauseous and hard to wake after deviated septum repair   • Scoliosis     very mild   • Urinary urgency        Past Surgical History:   Procedure Laterality Date   • BRAVO PROCEDURE N/A 2022    Procedure: ESOPHAGOGASTRODUODENOSCOPY WITH BRAVO #6 PLACEMENT;  Surgeon: Roland Chaudhry MD;   "Location: [facility] ENDOSCOPY;  Service: Gastroenterology;  Laterality: N/A;  PRE- GERD  POST- NORMAL   • COLONOSCOPY N/A 08/11/2022    Procedure: COLONOSCOPY TO CECUM;  Surgeon: Roland Chaudhry MD;  Location: [facility] ENDOSCOPY;  Service: Gastroenterology;  Laterality: N/A;  PRE- SCREENING  POST- HEMORRHOIDS, DIVERTICULOSIS   • D & C HYSTEROSCOPY ENDOMETRIAL ABLATION N/A 02/03/2020    Procedure: DILATATION AND CURETTAGE HYSTEROSCOPY NOVASURE ENDOMETRIAL ABLATION;  Surgeon: Onelia Tuttle MD;  Location: [facility] MAIN OR;  Service: Obstetrics/Gynecology;  Laterality: N/A;   • DILATATION AND CURETTAGE  2/2020    As part of endometrial ablation   • ENDOMETRIAL ABLATION  2/2020   • SEPTOPLASTY, RESECTION INFERIOR TURBINATES  02/2016   • TOTAL LAPAROSCOPIC HYSTERECTOMY Bilateral 2/7/2023    Procedure: TOTAL LAPAROSCOPIC HYSTERECTOMY and bilateral salpingectomy;  Surgeon: Onelia Tuttle MD;  Location: [facility] OR;  Service: Obstetrics/Gynecology;  Laterality: Bilateral;   • UPPER GASTROINTESTINAL ENDOSCOPY  8/11/2022   • WISDOM TOOTH EXTRACTION  1990       Social History     Tobacco Use   Smoking Status Never   Smokeless Tobacco Never       has a current medication list which includes the following prescription(s): albuterol sulfate hfa, azelastine, cetirizine, diazepam, esomeprazole, fluoxetine, ibuprofen, lisinopril, montelukast, multivitamin with minerals, pulmicort flexhaler, triamcinolone acetonide, wheat dextrin, and chlorhexidine gluconate.  ________________________________________    Current contraception: status post hysterectomy      The following portions of the patient's history were reviewed and updated as appropriate: allergies, current medications, past family history, past medical history, past social history, past surgical history and problem list.    Review of Systems    Pertinent items are noted in HPI.     Objective   Physical Exam    /84   Ht 157.5 cm (62\")   Wt 96.2 kg (212 lb)   LMP " "07/26/2022 Comment: per patient \"spotting every 28 days\"  BMI 38.78 kg/m²    BP Readings from Last 3 Encounters:   02/21/23 148/84   02/07/23 129/99   02/03/23 134/87      Wt Readings from Last 3 Encounters:   02/21/23 96.2 kg (212 lb)   02/03/23 97.1 kg (214 lb)   01/24/23 96.2 kg (212 lb)      BMI: Estimated body mass index is 38.78 kg/m² as calculated from the following:    Height as of this encounter: 157.5 cm (62\").    Weight as of this encounter: 96.2 kg (212 lb).    Lungs: non labored breathing, no wheezing or tachpnea  Extremities: extremities normal, atraumatic, no cyanosis or edema  Skin: Skin color, texture, turgor normal. No rashes or lesions  Neurologic: Grossly normal  General:   alert, appears stated age and cooperative   Abdomen: soft, non-tender, without masses or organomegaly and incisions healing well                             Assessment:      Diagnoses and all orders for this visit:    1. Status post hysterectomy (Primary)      Doing well, follow up 4 weeks for vaginal cuff check        "

## 2023-03-20 ENCOUNTER — OFFICE VISIT (OUTPATIENT)
Dept: OBSTETRICS AND GYNECOLOGY | Age: 48
End: 2023-03-20
Payer: COMMERCIAL

## 2023-03-20 VITALS
BODY MASS INDEX: 38.9 KG/M2 | WEIGHT: 211.4 LBS | DIASTOLIC BLOOD PRESSURE: 76 MMHG | HEIGHT: 62 IN | SYSTOLIC BLOOD PRESSURE: 124 MMHG

## 2023-03-20 DIAGNOSIS — Z90.710 STATUS POST HYSTERECTOMY: Primary | ICD-10-CM

## 2023-03-20 PROCEDURE — 99024 POSTOP FOLLOW-UP VISIT: CPT | Performed by: OBSTETRICS & GYNECOLOGY

## 2023-03-20 RX ORDER — DOCUSATE SODIUM 100 MG/1
100 CAPSULE, LIQUID FILLED ORAL 2 TIMES DAILY
COMMUNITY

## 2023-03-20 RX ORDER — AZELASTINE HCL 205.5 UG/1
SPRAY NASAL
COMMUNITY
Start: 2023-02-28

## 2023-03-20 NOTE — PROGRESS NOTES
GYN Visit    3/20/2023    Patient: Kenyetta Montemayor          MR#:[id number]      Chief Complaint   Patient presents with   • Post-op     TLH 23       History of Present Illness    47 y.o. female  who presents for  Post op TLH check  6 weeks  Feels well, no issues  Walking           Patient's last menstrual period was 2022.    ________________________________________  Patient Active Problem List   Diagnosis   • Family history of breast cancer in mother   • Asthma   • H/O dysmenorrhea   • Anxiety   • Menorrhagia with regular cycle   • Essential hypertension, benign   • Chronic cough   • Heartburn   • Epigastric pain   • Family history of polyps in the colon   • Screening for colon cancer   • Mixed hyperlipidemia   • Acute non-recurrent maxillary sinusitis   • Intramural leiomyoma of uterus   • Dysmenorrhea       Past Medical History:   Diagnosis Date   • Allergic     many seasonal/indoor   • Anesthesia     VERY AGITATED WHEN WAKING UP AFTER NASAL SURGERY,  ALSO WOKE UP DURING REMOVAL OF WISDOM TEETH   • Anxiety    • Asthma ?   • At risk for sleep apnea    • Atypical squamous cells of undetermined significance (ASCUS) on Papanicolaou smear of cervix    • Constipation    • COVID    • Depression     very sad after my mom was diagnosed with brain cancer   • Fibroid 2019, maybe   • Frequent urination    • GERD (gastroesophageal reflux disease)     sometimes   • H/O dysmenorrhea    • H/O infertility    • History of migraine    • Hypertension    • Pelvic pain in female    • Pelvic prolapse    • PONV (postoperative nausea and vomiting) 2016    Nauseous and hard to wake after deviated septum repair   • Scoliosis     very mild   • Urinary urgency        Past Surgical History:   Procedure Laterality Date   • BRAVO PROCEDURE N/A 2022    Procedure: ESOPHAGOGASTRODUODENOSCOPY WITH BRAVO #6 PLACEMENT;  Surgeon: Roland Chaudhry MD;  Location: [facility];  Service: Gastroenterology;   "Laterality: N/A;  PRE- GERD  POST- NORMAL   • COLONOSCOPY N/A 08/11/2022    Procedure: COLONOSCOPY TO CECUM;  Surgeon: Roland Chaudhry MD;  Location: [facility] ENDOSCOPY;  Service: Gastroenterology;  Laterality: N/A;  PRE- SCREENING  POST- HEMORRHOIDS, DIVERTICULOSIS   • D & C HYSTEROSCOPY ENDOMETRIAL ABLATION N/A 02/03/2020    Procedure: DILATATION AND CURETTAGE HYSTEROSCOPY NOVASURE ENDOMETRIAL ABLATION;  Surgeon: Onelia Tuttle MD;  Location: [facility] MAIN OR;  Service: Obstetrics/Gynecology;  Laterality: N/A;   • DILATATION AND CURETTAGE  2/2020    As part of endometrial ablation   • ENDOMETRIAL ABLATION  2/2020   • SEPTOPLASTY, RESECTION INFERIOR TURBINATES  02/2016   • TOTAL LAPAROSCOPIC HYSTERECTOMY Bilateral 2/7/2023    Procedure: TOTAL LAPAROSCOPIC HYSTERECTOMY and bilateral salpingectomy;  Surgeon: Onelia Tuttle MD;  Location: [facility] OR;  Service: Obstetrics/Gynecology;  Laterality: Bilateral;   • UPPER GASTROINTESTINAL ENDOSCOPY  8/11/2022   • WISDOM TOOTH EXTRACTION  1990       Social History     Tobacco Use   Smoking Status Never   Smokeless Tobacco Never       has a current medication list which includes the following prescription(s): albuterol sulfate hfa, azelastine, cetirizine, diazepam, docusate sodium, esomeprazole, fluoxetine, ibuprofen, lisinopril, montelukast, multivitamin with minerals, polyethylene glycol 3350, pulmicort flexhaler, triamcinolone acetonide, and wheat dextrin.  ________________________________________    Current contraception: status post hysterectomy      The following portions of the patient's history were reviewed and updated as appropriate: allergies, current medications, past family history, past medical history, past social history, past surgical history and problem list.    Review of Systems    Pertinent items are noted in HPI.     Objective   Physical Exam    /76   Ht 157.5 cm (62\")   Wt 95.9 kg (211 lb 6.4 oz)   LMP 07/26/2022 Comment: per patient " "\"spotting every 28 days\"  BMI 38.67 kg/m²    BP Readings from Last 3 Encounters:   03/20/23 124/76   02/21/23 148/84   02/07/23 129/99      Wt Readings from Last 3 Encounters:   03/20/23 95.9 kg (211 lb 6.4 oz)   02/21/23 96.2 kg (212 lb)   02/03/23 97.1 kg (214 lb)      BMI: Estimated body mass index is 38.67 kg/m² as calculated from the following:    Height as of this encounter: 157.5 cm (62\").    Weight as of this encounter: 95.9 kg (211 lb 6.4 oz).    Lungs: non labored breathing, no wheezing or tachpnea  Extremities: extremities normal, atraumatic, no cyanosis or edema  Skin: Skin color, texture, turgor normal. No rashes or lesions  Neurologic: Grossly normal  General:   alert, appears stated age and cooperative   Abdomen: soft, non-tender, without masses or organomegaly       Vulva: normal, Bartholin's, Urethra, [place]'s normal   Vagina: normal mucosa, cuff healed well, non tender   Cervix: absent   Uterus: absent    Adnexa: no mass, fullness, tenderness     Assessment:      Diagnoses and all orders for this visit:    1. Status post hysterectomy (Primary)      Cuff well healed, wait 2 weeks for IC        "

## 2023-04-08 DIAGNOSIS — I10 ESSENTIAL HYPERTENSION: ICD-10-CM

## 2023-04-10 RX ORDER — LISINOPRIL 20 MG/1
20 TABLET ORAL DAILY
Qty: 90 TABLET | Refills: 3 | Status: SHIPPED | OUTPATIENT
Start: 2023-04-10

## 2023-05-16 ENCOUNTER — OFFICE VISIT (OUTPATIENT)
Dept: FAMILY MEDICINE CLINIC | Facility: CLINIC | Age: 48
End: 2023-05-16
Payer: COMMERCIAL

## 2023-05-16 VITALS
OXYGEN SATURATION: 98 % | RESPIRATION RATE: 18 BRPM | BODY MASS INDEX: 38.83 KG/M2 | HEIGHT: 62 IN | DIASTOLIC BLOOD PRESSURE: 72 MMHG | HEART RATE: 73 BPM | WEIGHT: 211 LBS | SYSTOLIC BLOOD PRESSURE: 128 MMHG

## 2023-05-16 DIAGNOSIS — R05.1 ACUTE COUGH: ICD-10-CM

## 2023-05-16 DIAGNOSIS — J30.2 SEASONAL ALLERGIES: Primary | ICD-10-CM

## 2023-05-16 PROCEDURE — 99214 OFFICE O/P EST MOD 30 MIN: CPT | Performed by: NURSE PRACTITIONER

## 2023-05-16 RX ORDER — BENZONATATE 100 MG/1
100 CAPSULE ORAL 3 TIMES DAILY PRN
Qty: 30 CAPSULE | Refills: 0 | Status: SHIPPED | OUTPATIENT
Start: 2023-05-16 | End: 2023-05-26

## 2023-05-16 RX ORDER — METHYLPREDNISOLONE 4 MG/1
TABLET ORAL
Qty: 1 EACH | Refills: 0 | Status: SHIPPED | OUTPATIENT
Start: 2023-05-16

## 2023-05-16 NOTE — PROGRESS NOTES
Subjective   Kenyetta Montemayor is a 47 y.o. female.     History of Present Illness   Patient presents with seasonal allergy symptoms. She reports that she's having running nose, postnasal drip and cough. She reports that she takes daily montelukast, zyrtec and inhalers for this. She was seen previously by an allergist several years ago, but reports that she did not respond well to allergy shots. I discussed trying to change up her antihistamine as she's been on this for quite some time. She denies any fever or shortness of breath, but reports that she has had some wheezing.     The following portions of the patient's history were reviewed and updated as appropriate: allergies, current medications, past family history, past medical history, past social history, past surgical history and problem list.    Review of Systems   Constitutional: Negative for chills, fever and unexpected weight loss.   HENT: Positive for postnasal drip and rhinorrhea. Negative for ear pain and sore throat.    Respiratory: Positive for cough and wheezing. Negative for chest tightness and shortness of breath.    Cardiovascular: Negative for chest pain, palpitations and leg swelling.   Musculoskeletal: Negative for myalgias.   Skin: Negative for rash.       Objective   Physical Exam  Vitals and nursing note reviewed.   Constitutional:       Appearance: Normal appearance. She is well-developed.   HENT:      Head: Normocephalic and atraumatic.      Right Ear: Tympanic membrane, ear canal and external ear normal.      Left Ear: Tympanic membrane, ear canal and external ear normal.      Nose: Nose normal.      Right Sinus: No maxillary sinus tenderness or frontal sinus tenderness.      Left Sinus: No maxillary sinus tenderness or frontal sinus tenderness.      Mouth/Throat:      Lips: Pink.      Mouth: Mucous membranes are moist.      Pharynx: Oropharynx is clear. No pharyngeal swelling or oropharyngeal exudate.   Eyes:      General:         Right  eye: No discharge.         Left eye: No discharge.      Conjunctiva/sclera: Conjunctivae normal.      Pupils: Pupils are equal, round, and reactive to light.   Neck:      Thyroid: No thyromegaly.   Cardiovascular:      Rate and Rhythm: Normal rate and regular rhythm.      Heart sounds: Normal heart sounds. No murmur heard.  Pulmonary:      Effort: Pulmonary effort is normal. No tachypnea or respiratory distress.      Breath sounds: Normal breath sounds. No decreased breath sounds, wheezing or rales.   Musculoskeletal:      Cervical back: Normal range of motion and neck supple.   Lymphadenopathy:      Cervical: No cervical adenopathy.   Skin:     General: Skin is warm and dry.   Neurological:      Mental Status: She is alert and oriented to person, place, and time.   Psychiatric:         Behavior: Behavior normal.         Thought Content: Thought content normal.         Judgment: Judgment normal.         Vitals:    05/16/23 1017   BP: 128/72   Pulse: 73   Resp: 18   SpO2: 98%     Body mass index is 38.59 kg/m².      Procedures    Assessment & Plan   Problems Addressed this Visit    None  Visit Diagnoses     Seasonal allergies    -  Primary    Relevant Medications    methylPREDNISolone (MEDROL) 4 MG dose pack    Acute cough        Relevant Medications    benzonatate (Tessalon Perles) 100 MG capsule      Diagnoses       Codes Comments    Seasonal allergies    -  Primary ICD-10-CM: J30.2  ICD-9-CM: 477.9     Acute cough     ICD-10-CM: R05.1  ICD-9-CM: 786.2         Medrol dose pack as directed  Benzonatate 100 mg 1 p.o. TID PRN cough  Recommended patient try changing zyrtec to another antihistamine such as allegra  Advised that patient follow-up with her allergist.          Return if symptoms worsen or fail to improve.  Answers for HPI/ROS submitted by the patient on 5/16/2023  What is the primary reason for your visit?: Cough

## 2023-07-12 ENCOUNTER — TELEPHONE (OUTPATIENT)
Dept: FAMILY MEDICINE CLINIC | Facility: CLINIC | Age: 48
End: 2023-07-12

## 2023-07-12 NOTE — TELEPHONE ENCOUNTER
Caller: Kenyetta Montemayor    Relationship to patient: Self    Best call back number:     Patient is needing: PATIENT STATES THAT HANDY HAD TOLD HER ABOUT A REALLY GOOD COLORECTAL SURGEON, AND SHE STATES SHE WOULD LIKE TO HAVE THAT SURGEON'S CONTACT INFORMATION.

## 2023-08-10 DIAGNOSIS — J45.909 UNCOMPLICATED ASTHMA, UNSPECIFIED ASTHMA SEVERITY, UNSPECIFIED WHETHER PERSISTENT: ICD-10-CM

## 2023-08-10 RX ORDER — MONTELUKAST SODIUM 10 MG/1
TABLET ORAL
Qty: 90 TABLET | Refills: 3 | Status: SHIPPED | OUTPATIENT
Start: 2023-08-10

## 2023-08-22 DIAGNOSIS — Z12.31 ENCOUNTER FOR SCREENING MAMMOGRAM FOR MALIGNANT NEOPLASM OF BREAST: Primary | ICD-10-CM

## 2023-09-08 RX ORDER — ESOMEPRAZOLE MAGNESIUM 40 MG/1
40 CAPSULE, DELAYED RELEASE ORAL 2 TIMES DAILY
Qty: 180 CAPSULE | Refills: 3 | Status: SHIPPED | OUTPATIENT
Start: 2023-09-08

## 2023-10-05 ENCOUNTER — OFFICE VISIT (OUTPATIENT)
Dept: SLEEP MEDICINE | Facility: HOSPITAL | Age: 48
End: 2023-10-05
Payer: COMMERCIAL

## 2023-10-05 VITALS
BODY MASS INDEX: 39.12 KG/M2 | HEIGHT: 62 IN | DIASTOLIC BLOOD PRESSURE: 87 MMHG | WEIGHT: 212.6 LBS | HEART RATE: 81 BPM | SYSTOLIC BLOOD PRESSURE: 145 MMHG | OXYGEN SATURATION: 99 %

## 2023-10-05 DIAGNOSIS — G47.30 OBSERVED SLEEP APNEA: Primary | ICD-10-CM

## 2023-10-05 DIAGNOSIS — E66.9 CLASS 1 OBESITY: ICD-10-CM

## 2023-10-05 DIAGNOSIS — G47.8 NON-RESTORATIVE SLEEP: ICD-10-CM

## 2023-10-05 DIAGNOSIS — G47.19 EXCESSIVE DAYTIME SLEEPINESS: ICD-10-CM

## 2023-10-05 DIAGNOSIS — R06.83 SNORING: ICD-10-CM

## 2023-10-05 DIAGNOSIS — G47.9 SLEEP DISTURBANCE: ICD-10-CM

## 2023-10-05 PROBLEM — E66.811 CLASS 1 OBESITY: Status: ACTIVE | Noted: 2023-10-05

## 2023-10-05 PROCEDURE — G0463 HOSPITAL OUTPT CLINIC VISIT: HCPCS

## 2023-10-05 NOTE — PROGRESS NOTES
[facility]  [address]  [address]  [place] [address]  Phone   Fax       Kenyetta Montemayor  [id number]   1975  48 y.o.  female      PCP:Jorge Luis White MD    Type of service: Initial New Patient Office Visit  Date of service: 10/5/2023    Chief Complaint   Patient presents with    Witnessed Apnea    Snoring    Non-restorative Sleep    Fatigue    Dry Mouth    Daytime Sleepiness    Obesity       History of present illness;  Kenyetta Montemayor 48 y.o.  is a new patient for me and was seen today for sleep related problems of snoring, non-restorative sleep and witnessed apneas. The symptoms are present for few years and they are persistent in nature.  The snoring is present in all positions and it is loud.  Patient has  prior surgery namely nasal surgery septoplasty.  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  She feels tired.  She is also under a lot of stress and her best friend is undergoing chemotherapy    Patient gives the following sleep history.  Sleep schedule:  Bedtime: 9  Wake time: 8  Normally takes about 30-60 minutes to fall asleep  Average hours of sleep 9  Number of naps per day no  Symptoms  In addition to snoring, nonrestorative sleep and witnessed apneas patient gives the following associated symptoms.  Have you ever awakened gasping for breath, coughing, choking: Yes   Change in weight,  Yes   Morning headaches  No   Awaken with a sore throat or dry mouth  No   Leg jerking at night:  No   Crawly feeling/urge sensation to move in the legs: Yes   Teeth grinding:No   Have you ever awakened at night with a sour taste or burning sensation in your chest:  No   Do you have muscle weakness with laughing or anger or sleep paralysis:  No   Have you ever felt paralyzed while going to sleep or waking up:  No   Sleepwalking, nightmares, No   Nocturia (urination at night): 2 times per night  Memory Problem:No  "    Past medical history: (Relevant to sleep medicine)  Asthma  Anxiety    Medications are reviewed by me and documented in the encounter  Allergies reviewed and documented in encounter    Social history:  Do you drive a commercial vehicle:  No   Shift work:  No   Tobacco use:  No   Alcohol use:  7 per week  Caffeinated drinks: 2    FAMILY HISTORY (Your mother, father, brothers and sisters) (relevant to sleep medicine)  Sleep apnea, father    REVIEW OF SYSTEMS.  Full review of systems available on the intake form which is scanned in the media tab.  The relevant positive are noted below  Daytime excessive sleepiness with [place] Sleepiness Scale :Total score: 4   Snoring      Physical exam:  Vitals:    10/05/23 0828   BP: 145/87   Pulse: 81   SpO2: 99%   Weight: 96.4 kg (212 lb 9.6 oz)   Height: 157.5 cm (62\")    Body mass index is 38.89 kg/m². Neck Circumference: 14.5 inches  Nose: no nasal septal defects or deviation and the nasal passages are clear, no nasal polyps,  Throat: tonsils are not enlarged, tongue normal, oral airway Mallampati class 3  NECK:Neck Circumference: 14.5 inches, trachea is in the midline, thyroid not enlarged  RESPIRATORY SYSTEM: Breath sounds are equal on both sides, there are no wheezes   CARDIOVASULAR SYSTEM: Heart sounds are regular rhythm and kurt rate, no edema  EXTREMITES: No cyanosis, clubbing  NEUROLOGICAL SYSTEM: Oriented x 3, no gross motor defects, gait normal    Office notes from care team reviewed. Office note dated January 24, 2023,reviewed      Assessment and plan:  Witnessed apnea (R06.81) patient's symptoms and examination is consistent with sleep apnea (G47.30)  I have talked to the patient about the signs and symptoms of sleep apnea. In addition, I have also discussed pathophysiology of sleep apnea.  I also discussed the complications of untreated sleep apnea including effects on hypertension, diabetes mellitus and nonrestorative sleep with hypersomnia which can increase " risk for motor vehicle accidents.  Untreated sleep apnea is also a risk factor for development of atrial fibrillation, pulmonary hypertension, insulin resistance and stroke.  Discussed in detail of various testing methods including home-based and lab based sleep studies.  Based on history and physical examination the most appropriate study is home sleep test.  The order for the sleep study is placed in [facility].  The test will be scheduled after approval from insurance. Treatment and management will be discussed after the test is completed.  Patient was given opportunity to ask questions and all the questions were answered.   Snoring (R06.83) snoring is the sound created by turbulent airflow vibrating upper airway soft tissue.  I have also discussed factors affecting snoring including sleep deprivation, sleeping on the back and alcohol ingestion. To minimize snoring, patient is advised to have adequate sleep, sleep on the side and avoid alcohol and sedative medications before bedtime  Daytime excessive sleepiness .  It was assessed with [place] Sleepiness Scale of Total score: 4.  There are many causes for daytime excessive sleepiness including sleep depression, shiftwork syndrome, depression and other medical disorders including heart, kidney and liver failure.  The most serious cause of excessive sleepiness is due to neurological conditions like narcolepsy/cataplexy.  But the most common cause of excessive sleepiness is due to sleep apnea with frequent awakenings during sleep time.  I have discussed safety of driving and to remain vigilant while driving.  Obesity 2, with BMI Body mass index is 38.89 kg/m².. I have discussed the relationship between weight and sleep apnea.There is direct correlation between weight and severity of sleep apnea.  Weight reduction is encouraged, as it is going to reduce the severity of sleep apnea. I have also discussed with the patient diet and exercise to achieve ideal body  weight      Adequate amount of sleep.  Generally most people needs about 7 to 8 hours of sleep.  Return for with smart card down load, 31 to 90 days after PAP setup with down load..  Patient's questions were answered.      10/5/2023  Julian Kauffman MD  Sleep Medicine  Medical Director  [facility]: [facility]

## 2023-10-12 RX ORDER — FLUOXETINE HYDROCHLORIDE 20 MG/1
20 CAPSULE ORAL DAILY
Qty: 90 CAPSULE | Refills: 3 | Status: SHIPPED | OUTPATIENT
Start: 2023-10-12

## 2023-10-17 ENCOUNTER — HOSPITAL ENCOUNTER (OUTPATIENT)
Dept: SLEEP MEDICINE | Facility: HOSPITAL | Age: 48
Discharge: HOME | End: 2023-10-17
Admitting: INTERNAL MEDICINE
Payer: COMMERCIAL

## 2023-10-17 DIAGNOSIS — R06.83 SNORING: ICD-10-CM

## 2023-10-17 DIAGNOSIS — E66.9 CLASS 1 OBESITY: ICD-10-CM

## 2023-10-17 DIAGNOSIS — G47.30 OBSERVED SLEEP APNEA: ICD-10-CM

## 2023-10-17 DIAGNOSIS — G47.19 EXCESSIVE DAYTIME SLEEPINESS: ICD-10-CM

## 2023-10-17 DIAGNOSIS — G47.8 NON-RESTORATIVE SLEEP: ICD-10-CM

## 2023-10-17 PROCEDURE — 95806 SLEEP STUDY UNATT&RESP EFFT: CPT

## 2023-11-07 ENCOUNTER — TELEPHONE (OUTPATIENT)
Dept: SLEEP MEDICINE | Facility: HOSPITAL | Age: 48
End: 2023-11-07
Payer: COMMERCIAL

## 2023-11-07 NOTE — TELEPHONE ENCOUNTER
LM informing patient of negative result.  Instructed patient to call if she has questions or to follow up with the doctor if she desires.

## 2023-12-14 ENCOUNTER — HOSPITAL ENCOUNTER (OUTPATIENT)
Facility: HOSPITAL | Age: 48
Discharge: HOME | End: 2023-12-14
Admitting: OBSTETRICS & GYNECOLOGY
Payer: COMMERCIAL

## 2023-12-14 ENCOUNTER — OFFICE VISIT (OUTPATIENT)
Dept: OBSTETRICS AND GYNECOLOGY | Age: 48
End: 2023-12-14
Payer: COMMERCIAL

## 2023-12-14 VITALS
SYSTOLIC BLOOD PRESSURE: 126 MMHG | DIASTOLIC BLOOD PRESSURE: 84 MMHG | HEIGHT: 62 IN | WEIGHT: 208.8 LBS | BODY MASS INDEX: 38.42 KG/M2

## 2023-12-14 DIAGNOSIS — Z01.419 ENCOUNTER FOR GYNECOLOGICAL EXAMINATION WITHOUT ABNORMAL FINDING: Primary | ICD-10-CM

## 2023-12-14 DIAGNOSIS — Z12.31 ENCOUNTER FOR SCREENING MAMMOGRAM FOR MALIGNANT NEOPLASM OF BREAST: ICD-10-CM

## 2023-12-14 PROCEDURE — 99396 PREV VISIT EST AGE 40-64: CPT | Performed by: OBSTETRICS & GYNECOLOGY

## 2023-12-14 PROCEDURE — 77067 SCR MAMMO BI INCL CAD: CPT

## 2023-12-14 PROCEDURE — 77063 BREAST TOMOSYNTHESIS BI: CPT

## 2023-12-14 RX ORDER — FLUOXETINE 10 MG/1
10 CAPSULE ORAL DAILY
Qty: 30 CAPSULE | Refills: 12 | Status: SHIPPED | OUTPATIENT
Start: 2023-12-14

## 2023-12-14 NOTE — PROGRESS NOTES
Routine Annual Visit    2023    Patient: Kenyetta Montemayor          MR#:[id number]      Chief Complaint   Patient presents with    Gynecologic Exam     Annual Exam - last pap 22 neg, mammo today, colonoscopy 22, pt has no complaints today        History of Present Illness    48 y.o. female  who presents for annual exam.     Patient feels well  She has an occasional hot flash  Doing well after her hysterectomy  Mammogram today  Colonoscopy is up-to-date  Pap is not indicated  Hosting 's family over the holidays    [facility] reduced dose of Prozac      Patient's last menstrual period was 2022.  Obstetric History:  OB History          0    Para   0    Term   0       0    AB   0    Living   0         SAB   0    IAB   0    Ectopic   0    Molar   0    Multiple   0    Live Births   0               Menstrual History:     Patient's last menstrual period was 2022.       Sexual History:       ________________________________________  Patient Active Problem List   Diagnosis    Family history of breast cancer in mother    Asthma    H/O dysmenorrhea    Anxiety    Menorrhagia with regular cycle    Essential hypertension, benign    Chronic cough    Heartburn    Epigastric pain    Family history of polyps in the colon    Screening for colon cancer    Mixed hyperlipidemia    Acute non-recurrent maxillary sinusitis    Intramural leiomyoma of uterus    Dysmenorrhea    Observed sleep apnea    Snoring    Excessive daytime sleepiness    Non-restorative sleep    Class 1 obesity       Past Medical History:   Diagnosis Date    Allergic     many seasonal/indoor    Anesthesia     VERY AGITATED WHEN WAKING UP AFTER NASAL SURGERY,  ALSO WOKE UP DURING REMOVAL OF WISDOM TEETH    Anxiety     Asthma ?    At risk for sleep apnea     Atypical squamous cells of undetermined significance (ASCUS) on Papanicolaou smear of cervix     Constipation     COVID     Depression     very sad after my  mom was diagnosed with brain cancer    Fibroid 2019, maybe    Frequent urination     GERD (gastroesophageal reflux disease)     sometimes    H/O dysmenorrhea     H/O infertility     Headache     pms, stress/tension    History of migraine     Hypertension 2020    Pelvic pain in female     Pelvic prolapse     PONV (postoperative nausea and vomiting) Feb 2016    Nauseous and hard to wake after deviated septum repair    Scoliosis     very mild    Urinary urgency        Past Surgical History:   Procedure Laterality Date    BRAVO PROCEDURE N/A 08/11/2022    Procedure: ESOPHAGOGASTRODUODENOSCOPY WITH BRAVO #6 PLACEMENT;  Surgeon: Roland Chaudhry MD;  Location: [facility] ENDOSCOPY;  Service: Gastroenterology;  Laterality: N/A;  PRE- GERD  POST- NORMAL    COLONOSCOPY N/A 08/11/2022    Procedure: COLONOSCOPY TO CECUM;  Surgeon: Roland Chaudhry MD;  Location: [facility] ENDOSCOPY;  Service: Gastroenterology;  Laterality: N/A;  PRE- SCREENING  POST- HEMORRHOIDS, DIVERTICULOSIS    D & C HYSTEROSCOPY ENDOMETRIAL ABLATION N/A 02/03/2020    Procedure: DILATATION AND CURETTAGE HYSTEROSCOPY NOVASURE ENDOMETRIAL ABLATION;  Surgeon: Onelia Tuttle MD;  Location: [facility] OR;  Service: Obstetrics/Gynecology;  Laterality: N/A;    DILATATION AND CURETTAGE  2/2020    As part of endometrial ablation    ENDOMETRIAL ABLATION  2/2020    SEPTOPLASTY  2/2016    SEPTOPLASTY, RESECTION INFERIOR TURBINATES  02/2016    SINUS SURGERY  2/2016    Turbinate reduction    TOTAL LAPAROSCOPIC HYSTERECTOMY Bilateral 02/07/2023    Procedure: TOTAL LAPAROSCOPIC HYSTERECTOMY and bilateral salpingectomy;  Surgeon: Onelia Tuttle MD;  Location: [facility] OR;  Service: Obstetrics/Gynecology;  Laterality: Bilateral;    UPPER GASTROINTESTINAL ENDOSCOPY  8/11/2022    WISDOM TOOTH EXTRACTION  1990       Social History     Tobacco Use   Smoking Status Never   Smokeless Tobacco Never       has a current medication list which includes the following prescription(s):  "albuterol sulfate hfa, azelastine, cetirizine, diazepam, esomeprazole, lisinopril, montelukast, multivitamin with minerals, pulmicort flexhaler, triamcinolone acetonide, wheat dextrin, and fluoxetine.  ________________________________________    Current contraception: status post hysterectomy  History of abnormal Pap smear: no  Family history of Breast cancer: mother, pt is [place]  Family history of uterine or ovarian cancer: no  Family History of colon cancer/colon polyps: no  History of abnormal mammogram: no      The following portions of the patient's history were reviewed and updated as appropriate: allergies, current medications, past family history, past medical history, past social history, past surgical history, and problem list.    Review of Systems    Pertinent items are noted in HPI.     Objective   Physical Exam    /84   Ht 157.5 cm (62\")   Wt 94.7 kg (208 lb 12.8 oz)   LMP 07/26/2022 Comment: per patient \"spotting every 28 days\"  BMI 38.19 kg/m²    BP Readings from Last 3 Encounters:   12/14/23 126/84   10/05/23 145/87   05/16/23 128/72      Wt Readings from Last 3 Encounters:   12/14/23 94.7 kg (208 lb 12.8 oz)   10/05/23 96.4 kg (212 lb 9.6 oz)   05/16/23 95.7 kg (211 lb)      BMI: Estimated body mass index is 38.19 kg/m² as calculated from the following:    Height as of this encounter: 157.5 cm (62\").    Weight as of this encounter: 94.7 kg (208 lb 12.8 oz).      General:   alert, appears stated age, and cooperative   Abdomen: soft, non-tender, without masses or organomegaly   Breast: inspection negative, no nipple discharge or bleeding, no masses or nodularity palpable   Vulva: normal   Vagina: normal mucosa   Cervix: absent   Uterus: absent   Adnexa: normal adnexa     Assessment:    1. Normal annual exam   Assessment     ICD-10-CM ICD-9-CM   1. Encounter for gynecological examination without abnormal finding  Z01.419 V72.31     Plan:    Plan     [x]  Mammogram request made  []  PAP " done  []  Labs:   []  GC/Chl/TV  []  DEXA scan   []  Referral for colonoscopy:       Diagnoses and all orders for this visit:    1. Encounter for gynecological examination without abnormal finding (Primary)    Other orders  -     FLUoxetine (PROzac) 10 MG capsule; Take 1 capsule by mouth Daily.  Dispense: 30 capsule; Refill: 12            Counseling:  --Nutrition: Stressed importance of moderation and caloric balance, stressed fresh fruit and vegetables  --Exercise: Stressed the importance of regular exercise. 3-5 times weekly   - Discussed screening mammogram recommendations.   --Discussed benefits of screening colonoscopy- age 45 unless FH  --Discussed pap smear screening recommendations

## 2024-03-01 DIAGNOSIS — J45.909 UNCOMPLICATED ASTHMA, UNSPECIFIED ASTHMA SEVERITY, UNSPECIFIED WHETHER PERSISTENT: ICD-10-CM

## 2024-03-01 RX ORDER — MONTELUKAST SODIUM 10 MG/1
10 TABLET ORAL
Qty: 90 TABLET | Refills: 3 | Status: SHIPPED | OUTPATIENT
Start: 2024-03-01

## 2024-04-02 DIAGNOSIS — I10 ESSENTIAL HYPERTENSION: ICD-10-CM

## 2024-04-02 RX ORDER — LISINOPRIL 20 MG/1
20 TABLET ORAL DAILY
Qty: 10 TABLET | Refills: 0 | Status: SHIPPED | OUTPATIENT
Start: 2024-04-02

## 2024-04-25 DIAGNOSIS — J45.909 UNCOMPLICATED ASTHMA, UNSPECIFIED ASTHMA SEVERITY, UNSPECIFIED WHETHER PERSISTENT: ICD-10-CM

## 2024-04-25 DIAGNOSIS — I10 ESSENTIAL HYPERTENSION: ICD-10-CM

## 2024-04-25 RX ORDER — MONTELUKAST SODIUM 10 MG/1
10 TABLET ORAL
Qty: 90 TABLET | Refills: 3 | Status: SHIPPED | OUTPATIENT
Start: 2024-04-25

## 2024-04-25 RX ORDER — FLUOXETINE 10 MG/1
10 CAPSULE ORAL DAILY
Qty: 30 CAPSULE | Refills: 12 | Status: SHIPPED | OUTPATIENT
Start: 2024-04-25

## 2024-04-26 RX ORDER — MONTELUKAST SODIUM 10 MG/1
10 TABLET ORAL
Qty: 90 TABLET | Refills: 3 | Status: SHIPPED | OUTPATIENT
Start: 2024-04-26

## 2024-04-26 RX ORDER — LISINOPRIL 20 MG/1
20 TABLET ORAL DAILY
Qty: 90 TABLET | Refills: 0 | Status: SHIPPED | OUTPATIENT
Start: 2024-04-26

## 2024-05-17 ENCOUNTER — OFFICE VISIT (OUTPATIENT)
Dept: FAMILY MEDICINE CLINIC | Facility: CLINIC | Age: 49
End: 2024-05-17
Payer: COMMERCIAL

## 2024-05-17 VITALS
DIASTOLIC BLOOD PRESSURE: 88 MMHG | WEIGHT: 210 LBS | OXYGEN SATURATION: 98 % | HEART RATE: 81 BPM | SYSTOLIC BLOOD PRESSURE: 144 MMHG | HEIGHT: 62 IN | RESPIRATION RATE: 16 BRPM | BODY MASS INDEX: 38.64 KG/M2

## 2024-05-17 DIAGNOSIS — M79.605 LEFT LEG PAIN: Primary | ICD-10-CM

## 2024-05-17 PROCEDURE — 99213 OFFICE O/P EST LOW 20 MIN: CPT | Performed by: FAMILY MEDICINE

## 2024-05-17 RX ORDER — LORATADINE 10 MG/1
10 TABLET ORAL DAILY
COMMUNITY
Start: 2023-05-01

## 2024-05-17 NOTE — PROGRESS NOTES
"Subjective   Miguel Montemayor is a 48 y.o. female.   Leg Pain (Lt)    History of Present Illness  Miguel is here w/ c/o lt calf discomfort.  She began a new exercise routine recently which may be the cause.  She has been running and she did not start slowly.  So she stopped for now she says the discomfort is improving.  She would like to get back to exercise and wanted to discuss things that might be easier on her physically.  Review of Systems   Neurological:  Positive for numbness.       Objective   Vitals:    05/17/24 1401   BP: 144/88   Pulse: 81   Resp: 16   SpO2: 98%   Weight: 95.3 kg (210 lb)   Height: 157.5 cm (62\")      Body mass index is 38.41 kg/m².  She is starting to work on diet and exercise and we discussed different exercise programs she could do that would not hurt the healing leg pain in her left lower leg.    Physical Exam  Vitals and nursing note reviewed.   Constitutional:       General: She is not in acute distress.     Appearance: She is well-developed.   HENT:      Head: Normocephalic.   Cardiovascular:      Rate and Rhythm: Normal rate and regular rhythm.   Pulmonary:      Effort: Pulmonary effort is normal.   Musculoskeletal:      Cervical back: Normal range of motion.      Comments: Mild resolving tenderness in left lower leg   Skin:     General: Skin is warm and dry.      Findings: No rash.   Neurological:      Mental Status: She is alert and oriented to person, place, and time.   Psychiatric:         Mood and Affect: Mood normal.         Behavior: Behavior normal.           Assessment & Plan   Problem List Items Addressed This Visit    None  Visit Diagnoses       Left leg pain    -  Primary        Advised her to continue to watch this discomfort and if it gets worse to notify me and I will get her to appropriate follow-up.  She states she is doing better at this time and we discussed possible exercise programs that will not exacerbate the lower leg.       No orders of the defined types " were placed in this encounter.       Return for Next scheduled follow up.   Answers submitted by the patient for this visit:  Primary Reason for Visit (Submitted on 5/17/2024)  What is the primary reason for your visit?: Extremity Pain  Lower Extremity Injury Questionnaire (Submitted on 5/17/2024)  Chief Complaint: Extremity pain

## 2024-06-12 ENCOUNTER — OFFICE VISIT (OUTPATIENT)
Dept: FAMILY MEDICINE CLINIC | Facility: CLINIC | Age: 49
End: 2024-06-12
Payer: COMMERCIAL

## 2024-06-12 VITALS
BODY MASS INDEX: 38.64 KG/M2 | HEART RATE: 72 BPM | WEIGHT: 210 LBS | RESPIRATION RATE: 16 BRPM | DIASTOLIC BLOOD PRESSURE: 92 MMHG | HEIGHT: 62 IN | OXYGEN SATURATION: 98 % | SYSTOLIC BLOOD PRESSURE: 138 MMHG

## 2024-06-12 DIAGNOSIS — F41.9 ANXIETY: ICD-10-CM

## 2024-06-12 DIAGNOSIS — I10 ESSENTIAL HYPERTENSION: ICD-10-CM

## 2024-06-12 DIAGNOSIS — Z00.00 ROUTINE GENERAL MEDICAL EXAMINATION AT A HEALTH CARE FACILITY: Primary | ICD-10-CM

## 2024-06-12 PROCEDURE — 99214 OFFICE O/P EST MOD 30 MIN: CPT | Performed by: FAMILY MEDICINE

## 2024-06-12 PROCEDURE — 99396 PREV VISIT EST AGE 40-64: CPT | Performed by: FAMILY MEDICINE

## 2024-06-12 RX ORDER — LEVOCETIRIZINE DIHYDROCHLORIDE 5 MG/1
5 TABLET, FILM COATED ORAL EVERY EVENING
COMMUNITY

## 2024-06-12 RX ORDER — LISINOPRIL 20 MG/1
20 TABLET ORAL DAILY
Qty: 90 TABLET | Refills: 1 | Status: SHIPPED | OUTPATIENT
Start: 2024-06-12

## 2024-06-12 RX ORDER — DIAZEPAM 5 MG/1
5 TABLET ORAL EVERY 8 HOURS PRN
Qty: 20 TABLET | Refills: 0 | Status: SHIPPED | OUTPATIENT
Start: 2024-06-12

## 2024-06-12 NOTE — PROGRESS NOTES
"Preventive Exam    History of Present Illness: Kenyetta is here for check up and review of routine health maintenance.     She states she has some additional concerns. She reports trouble with emptying her bladder. An ultrasound tech had stated her bladder was full right after she had used the restroom in preparation for her hysterectomy. Additionally, she states she always feels the need to use the restroom right after having used the restroom.   Kenyetta also has concerns with some elbow, shoulder, and hip aches. She reports no known injuries to these areas. She reports limited range of motion due to some pain in these areas. Additionally, she has reported that her pelvic and abdominal muscles do not feel the same after her recent hysterectomy, is interested in referral to PT for pelvic floor physical therapy. She is requesting medication refills for her valium for an upcoming dental procedure and lisinopril.    Kenyetta has chronic hypertension and has been well controlled on current medications.She  is monitored by me every 6 months and is here today for follow up. She is tolerating medications without side effect. She reports no vision changes, headaches or lightheadedness. She is requesting refills of medications.     Pap Smear:no longer needed  Mammogram:up to date  Colon cancer screenin        REVIEW OF SYSTEMS  Constitutional: Negative.    HENT: Negative.    Eyes: Negative.    Respiratory: Negative.    Cardiovascular: Negative.    Gastrointestinal: Negative.    Endocrine: Negative.    Genitourinary: Negative.    Musculoskeletal: Negative.  Skin: Negative.    Allergic/Immunologic: Negative.    Neurological: Negative.    Hematological: Negative.    Psychiatric/Behavioral: Negative.    I have reviewed the ROS as documented by the MA. Jorge Luis White MD       PHYSICAL EXAM    Vitals:    24 1452   BP: 138/92   Pulse: 72   Resp: 16   SpO2: 98%   Weight: 95.3 kg (210 lb)   Height: 157.5 cm (62\") "     GENERAL: alert and oriented, afebrile and vital signs stable  HEENT: oral mucosa moist, PEERLA, EOM, conjunctiva normal  No cervical adenopathy  LUNGS: clear to ascultation bilaterally, no rales, ronchi or wheezing  HEART: RRR S1 S2 without murmers, thrills, rubs or gallops  CHEST WALL: within normal limits, no tenderness  ABDOMEN: WNL. Normal BS.  EXTREMITIES: No clubbing, cyanosis or edema noted. Normal Pulses.  SKIN: warm, dry, no rashes noted  NEURO: CN II- XII grossly intact  PSYCH: Good mood and positive affect  ASSESSMENT AND PLAN  Problem List Items Addressed This Visit          Mental Health    Anxiety    Overview     She is well managed on current meds, Prozac 10 mg and also takes diazepam on occasion. She reports no signs or symptoms of self harm, suicidal ideation. This medication helps with daily life and relationships. Will refill as needed and advised 6 month follow up.         Relevant Medications    diazePAM (VALIUM) 5 MG tablet     Other Visit Diagnoses       Routine general medical examination at a health care facility    -  Primary    Essential hypertension        Relevant Medications    lisinopril (PRINIVIL,ZESTRIL) 20 MG tablet              Routine health maintenance reviewed and discussed with Kenyetta.  Class 2 Severe Obesity (BMI >=35 and <=39.9). Obesity-related health conditions include the following: none. Obesity is newly identified. BMI is is above average; no BMI management plan is appropriate. We discussed portion control and increasing exercise.       She was advised to follow-up with her neurologist regarding her problems with urinary function.  She will follow-up with a physical therapist to help with the aches and pains that she is experiencing  No orders of the defined types were placed in this encounter.     Return in about 4 months (around 10/12/2024).

## 2024-09-04 ENCOUNTER — TELEPHONE (OUTPATIENT)
Dept: GASTROENTEROLOGY | Facility: CLINIC | Age: 49
End: 2024-09-04
Payer: COMMERCIAL

## 2024-09-04 RX ORDER — ESOMEPRAZOLE MAGNESIUM 40 MG/1
40 CAPSULE, DELAYED RELEASE ORAL 2 TIMES DAILY
Qty: 180 CAPSULE | Refills: 3 | OUTPATIENT
Start: 2024-09-04

## 2024-09-04 RX ORDER — ESOMEPRAZOLE MAGNESIUM 40 MG/1
40 CAPSULE, DELAYED RELEASE ORAL 2 TIMES DAILY
Qty: 180 CAPSULE | Refills: 0 | Status: SHIPPED | OUTPATIENT
Start: 2024-09-04

## 2024-09-04 NOTE — TELEPHONE ENCOUNTER
----- Message from [facility] sent at 9/4/2024 10:37 AM EDT -----  Regarding: Appointment Request  Contact: [phone]  Appointment Request From: Kenyetta Montemayor    With Provider: Roland Chaudhry [[facility] MEDICAL [facility] GASTROENTEROLOGY]    Preferred Date Range: Any    Preferred Times: Any Time    Reason for visit: Follow-up    Comments:  Medication refill

## 2024-09-04 NOTE — TELEPHONE ENCOUNTER
Pt made a f/u appt, 9/18 w/THOMAS Negrete, Rx refilled x1.  Called pt and advised. Pt verbalized understanding.

## 2024-09-04 NOTE — TELEPHONE ENCOUNTER
Called patient and scheduled an appt with Penelope, patient is out of medication in a few days and asks if a prescription can be filled until her appt. Please advise.

## 2024-09-18 ENCOUNTER — OFFICE VISIT (OUTPATIENT)
Dept: GASTROENTEROLOGY | Facility: CLINIC | Age: 49
End: 2024-09-18
Payer: COMMERCIAL

## 2024-09-18 VITALS
HEIGHT: 62 IN | WEIGHT: 210.8 LBS | HEART RATE: 75 BPM | DIASTOLIC BLOOD PRESSURE: 76 MMHG | SYSTOLIC BLOOD PRESSURE: 118 MMHG | BODY MASS INDEX: 38.79 KG/M2

## 2024-09-18 DIAGNOSIS — R12 HEARTBURN: Primary | ICD-10-CM

## 2024-09-18 RX ORDER — ESOMEPRAZOLE MAGNESIUM 40 MG/1
40 CAPSULE, DELAYED RELEASE ORAL 2 TIMES DAILY
Qty: 180 CAPSULE | Refills: 4 | Status: SHIPPED | OUTPATIENT
Start: 2024-09-18

## 2024-09-26 NOTE — PATIENT INSTRUCTIONS
Your Annual Physical  Personal Prevention Plan      Date of Office Visit:    Encounter Provider:  Jorge Luis White MD  Place of Service:  [facility] PRIMARY CARE  Patient Name: Kenyetta Montemayor  :  1975    As part of the Annual Physical portion of your visit today, we are providing you with this personalized preventive plan of services (PPPS). This plan is based upon recommendations of the [place] Preventive Services Task Force (USPSTF) and the Advisory Committee on Immunization Practices (ACIP).    This lists the preventive care services that should be considered, and provides dates of when you are due. Items listed as completed are up-to-date and do not require any further intervention.    Health Maintenance   Topic Date Due    BMI FOLLOWUP  Never done    INFLUENZA VACCINE  2024    LIPID PANEL  2025    ANNUAL PHYSICAL  2025    PAP SMEAR  2025    MAMMOGRAM  2025    TDAP/TD VACCINES (4 - Td or Tdap) 2031    COLORECTAL CANCER SCREENING  2032    HEPATITIS C SCREENING  Completed    COVID-19 Vaccine  Completed    Pneumococcal Vaccine 0-64  Completed       No orders of the defined types were placed in this encounter.      Return in about 4 months (around 10/12/2024).          Spoke with the patient and scheduled his pre-op clearance appointment on 10/2/24 at 10:20 am. The patient stated understanding

## 2024-11-17 DIAGNOSIS — I10 ESSENTIAL HYPERTENSION: ICD-10-CM

## 2024-11-18 RX ORDER — LISINOPRIL 20 MG/1
20 TABLET ORAL DAILY
Qty: 90 TABLET | Refills: 1 | Status: SHIPPED | OUTPATIENT
Start: 2024-11-18

## 2025-01-15 ENCOUNTER — TELEPHONE (OUTPATIENT)
Dept: OBSTETRICS AND GYNECOLOGY | Age: 50
End: 2025-01-15

## 2025-01-15 DIAGNOSIS — Z12.31 ENCOUNTER FOR SCREENING MAMMOGRAM FOR MALIGNANT NEOPLASM OF BREAST: Primary | ICD-10-CM

## 2025-01-15 NOTE — TELEPHONE ENCOUNTER
Caller: Kenyetta Montemayor    Relationship to patient: Self    Best call back number: [phone]    Patient is needing:     PT HAS ANNUAL ON 2/28/2025 @ 10:15 AND NEEDS MAMMO SAME DAY    PLEASE CALL TO SCHEDULE

## 2025-02-24 RX ORDER — FLUOXETINE 10 MG/1
10 CAPSULE ORAL DAILY
Qty: 30 CAPSULE | Refills: 3 | Status: SHIPPED | OUTPATIENT
Start: 2025-02-24

## 2025-02-28 ENCOUNTER — HOSPITAL ENCOUNTER (OUTPATIENT)
Facility: HOSPITAL | Age: 50
Discharge: HOME | End: 2025-02-28
Admitting: OBSTETRICS & GYNECOLOGY
Payer: COMMERCIAL

## 2025-02-28 DIAGNOSIS — Z12.31 ENCOUNTER FOR SCREENING MAMMOGRAM FOR MALIGNANT NEOPLASM OF BREAST: ICD-10-CM

## 2025-02-28 PROCEDURE — 77063 BREAST TOMOSYNTHESIS BI: CPT

## 2025-02-28 PROCEDURE — 77067 SCR MAMMO BI INCL CAD: CPT

## 2025-05-06 ENCOUNTER — OFFICE VISIT (OUTPATIENT)
Dept: OBSTETRICS AND GYNECOLOGY | Age: 50
End: 2025-05-06
Payer: COMMERCIAL

## 2025-05-06 VITALS
WEIGHT: 209 LBS | DIASTOLIC BLOOD PRESSURE: 84 MMHG | HEIGHT: 62 IN | BODY MASS INDEX: 38.46 KG/M2 | SYSTOLIC BLOOD PRESSURE: 136 MMHG

## 2025-05-06 DIAGNOSIS — Z01.419 ENCOUNTER FOR GYNECOLOGICAL EXAMINATION WITHOUT ABNORMAL FINDING: Primary | ICD-10-CM

## 2025-05-06 DIAGNOSIS — N95.1 MENOPAUSAL SYMPTOMS: ICD-10-CM

## 2025-05-06 DIAGNOSIS — Z12.31 SCREENING MAMMOGRAM FOR BREAST CANCER: Primary | ICD-10-CM

## 2025-05-06 PROCEDURE — 99396 PREV VISIT EST AGE 40-64: CPT | Performed by: OBSTETRICS & GYNECOLOGY

## 2025-05-06 RX ORDER — ESTRADIOL 0.05 MG/D
1 PATCH, EXTENDED RELEASE TRANSDERMAL WEEKLY
Qty: 24 EACH | Refills: 3 | Status: SHIPPED | OUTPATIENT
Start: 2025-05-06

## 2025-05-06 NOTE — PROGRESS NOTES
Routine Annual Visit    2025    Patient: Kenyetta Montemayor          MR#:[id number]      Chief Complaint   Patient presents with    Gynecologic Exam     Annual Exam - last pap 22 neg, hx of Hyst, mammo 25, colonoscopy 22, pt would like to discuss hormones/menopause         History of Present Illness    49 y.o. female  who presents for annual exam.       Patient is feeling well  She is beginning to have menopausal symptoms like hot flashes and night sweats that are mild but also tired and brain fog  She would like to try hormone replacement  She needs only estrogen as she has had a hysterectomy  We will start her at a half dose patch due to suspicion that she still has some ovarian function  Mammogram is up-to-date  Colonoscopy is up-to-date  Pap is not indicated    Patient's last menstrual period was 2022.  Obstetric History:  OB History          0    Para   0    Term   0       0    AB   0    Living   0         SAB   0    IAB   0    Ectopic   0    Molar   0    Multiple   0    Live Births   0               Menstrual History:     Patient's last menstrual period was 2022.       Sexual History:       ________________________________________  Patient Active Problem List   Diagnosis    Family history of breast cancer in mother    Asthma    H/O dysmenorrhea    Anxiety    Menorrhagia with regular cycle    Essential hypertension, benign    Chronic cough    Heartburn    Epigastric pain    Family history of polyps in the colon    Screening for colon cancer    Mixed hyperlipidemia    Acute non-recurrent maxillary sinusitis    Intramural leiomyoma of uterus    Dysmenorrhea    Observed sleep apnea    Snoring    Excessive daytime sleepiness    Non-restorative sleep    Class 1 obesity       Past Medical History:   Diagnosis Date    Allergic     many seasonal/indoor    Anesthesia     VERY AGITATED WHEN WAKING UP AFTER NASAL SURGERY,  ALSO WOKE UP DURING REMOVAL OF WISDOM TEETH     Anxiety     flying, dentist, social    Asthma 2002?    At risk for sleep apnea     Atypical squamous cells of undetermined significance (ASCUS) on Papanicolaou smear of cervix     Constipation     COVID     Depression 2015/2016    very sad after my mom was diagnosed with brain cancer    Fibroid 2019, maybe    Frequent urination     GERD (gastroesophageal reflux disease)     sometimes    H/O dysmenorrhea     H/O infertility     Headache     pms, stress/tension    History of migraine     Hyperlipidemia     Slightly elevated at last physical    Hypertension 2020    Pelvic pain in female     Pelvic prolapse     PONV (postoperative nausea and vomiting) Feb 2016    Nauseous and hard to wake after deviated septum repair    Scoliosis     very mild    Urinary urgency        Past Surgical History:   Procedure Laterality Date    BRAVO PROCEDURE N/A 08/11/2022    Procedure: ESOPHAGOGASTRODUODENOSCOPY WITH BRAVO #6 PLACEMENT;  Surgeon: Roland Chaudhry MD;  Location: [facility] ENDOSCOPY;  Service: Gastroenterology;  Laterality: N/A;  PRE- GERD  POST- NORMAL    COLONOSCOPY N/A 08/11/2022    Procedure: COLONOSCOPY TO CECUM;  Surgeon: Roland Chaudhry MD;  Location: [facility] ENDOSCOPY;  Service: Gastroenterology;  Laterality: N/A;  PRE- SCREENING  POST- HEMORRHOIDS, DIVERTICULOSIS    D & C HYSTEROSCOPY ENDOMETRIAL ABLATION N/A 02/03/2020    Procedure: DILATATION AND CURETTAGE HYSTEROSCOPY NOVASURE ENDOMETRIAL ABLATION;  Surgeon: Onelia Tuttle MD;  Location: [facility] OR;  Service: Obstetrics/Gynecology;  Laterality: N/A;    DILATATION AND CURETTAGE  2/2020    As part of endometrial ablation    ENDOMETRIAL ABLATION  2/2020    REPAIR CHOANAL ATRESIA  2016    Deviated septum    SEPTOPLASTY  2/2016    SEPTOPLASTY, RESECTION INFERIOR TURBINATES  02/2016    SINUS SURGERY  2/2016    Turbinate reduction    SUBTOTAL HYSTERECTOMY  2/2023    Still have ovaries    TOTAL LAPAROSCOPIC HYSTERECTOMY Bilateral 02/07/2023    Procedure: TOTAL  "LAPAROSCOPIC HYSTERECTOMY and bilateral salpingectomy;  Surgeon: Onelia Tuttle MD;  Location: [facility];  Service: Obstetrics/Gynecology;  Laterality: Bilateral;    UPPER GASTROINTESTINAL ENDOSCOPY  8/11/2022    VAGINAL HYSTERECTOMY  02/2023    WISDOM TOOTH EXTRACTION  1990       Social History     Tobacco Use   Smoking Status Never   Smokeless Tobacco Never       has a current medication list which includes the following prescription(s): azelastine, diazepam, esomeprazole, fluoxetine, lisinopril, montelukast, triamcinolone acetonide, wheat dextrin, estradiol, and multivitamin with minerals.  ________________________________________    Current contraception: status post hysterectomy  History of abnormal Pap smear: no  Family history of Breast cancer: mother  Family history of uterine or ovarian cancer: no  Family History of colon cancer/colon polyps: yes - mother polyps  History of abnormal mammogram: no      The following portions of the patient's history were reviewed and updated as appropriate: allergies, current medications, past family history, past medical history, past social history, past surgical history, and problem list.    Review of Systems    Pertinent items are noted in HPI.     Objective   Physical Exam    /84 (BP Location: Left arm, Patient Position: Sitting)   Ht 157.5 cm (62\")   Wt 94.8 kg (209 lb)   LMP 07/26/2022 Comment: per patient \"spotting every 28 days\"  BMI 38.23 kg/m²    BP Readings from Last 3 Encounters:   05/06/25 136/84   09/18/24 118/76   06/12/24 138/92      Wt Readings from Last 3 Encounters:   05/06/25 94.8 kg (209 lb)   09/18/24 95.6 kg (210 lb 12.8 oz)   06/12/24 95.3 kg (210 lb)      BMI: Estimated body mass index is 38.23 kg/m² as calculated from the following:    Height as of this encounter: 157.5 cm (62\").    Weight as of this encounter: 94.8 kg (209 lb).      General:   alert, appears stated age, and cooperative   Abdomen: soft, non-tender, without masses " or organomegaly   Breast: inspection negative, no nipple discharge or bleeding, no masses or nodularity palpable   Vulva: normal, Bartholin's, Urethra, Bairdford's normal   Vagina: normal mucosa   Cervix: no cervical motion tenderness and no lesions   Uterus: normal size, mobile, and non-tender   Adnexa: no mass, fullness, tenderness     Assessment:    1. Normal annual exam   Assessment     ICD-10-CM ICD-9-CM   1. Encounter for gynecological examination without abnormal finding  Z01.419 V72.31   2. Menopausal symptoms  N95.1 627.2     Plan:    Plan     [x]  Mammogram request made  []  PAP done  []  Labs:   []  GC/Chl/TV  []  DEXA scan   []  Referral for colonoscopy:       Diagnoses and all orders for this visit:    1. Encounter for gynecological examination without abnormal finding (Primary)    2. Menopausal symptoms    Other orders  -     estradiol (Vivelle-Dot) 0.05 MG/24HR patch; Place 1 patch on the skin as directed by provider 1 (One) Time Per Week.  Dispense: 24 each; Refill: 3    Discussed patch as well as gel, patient would like to try the patch first        Counseling:  --Nutrition: Stressed importance of moderation and caloric balance, stressed fresh fruit and vegetables  --Exercise: Stressed the importance of regular exercise. 3-5 times weekly   - Discussed screening mammogram recommendations.   --Discussed benefits of screening colonoscopy- age 45 unless FH  --Discussed pap smear screening recommendations

## 2025-05-10 DIAGNOSIS — I10 ESSENTIAL HYPERTENSION: ICD-10-CM

## 2025-05-12 RX ORDER — LISINOPRIL 20 MG/1
20 TABLET ORAL DAILY
Qty: 90 TABLET | Refills: 1 | Status: SHIPPED | OUTPATIENT
Start: 2025-05-12

## 2025-05-13 ENCOUNTER — TELEPHONE (OUTPATIENT)
Dept: OBSTETRICS AND GYNECOLOGY | Age: 50
End: 2025-05-13
Payer: COMMERCIAL

## 2025-05-13 NOTE — TELEPHONE ENCOUNTER
Pt called and stated estella advised her that there is a discrepancy in her estradiol patch.  Pt stated Dr. Tuttle told her Twice weekly, pharmacy advised pt it is Normally written for twice weekly.  Please advise if once weekly is correct or if SIG needs changed to twice weekly

## 2025-05-19 RX ORDER — FLUOXETINE 10 MG/1
10 CAPSULE ORAL DAILY
Qty: 90 CAPSULE | Refills: 3 | Status: SHIPPED | OUTPATIENT
Start: 2025-05-19

## (undated) DEVICE — ENDOCUT SCISSOR TIP, DISPOSABLE: Brand: RENEW

## (undated) DEVICE — 2, DISPOSABLE SUCTION/IRRIGATOR WITH DISPOSABLE TIP: Brand: STRYKEFLOW

## (undated) DEVICE — HARMONIC ACE +7 LAPAROSCOPIC SHEARS ADVANCED HEMOSTASIS 5MM DIAMETER 36CM SHAFT LENGTH  FOR USE WITH GRAY HAND PIECE ONLY: Brand: HARMONIC ACE

## (undated) DEVICE — LOU LAVH: Brand: MEDLINE INDUSTRIES, INC.

## (undated) DEVICE — SUT VIC 0 TN 27IN DYED JTN0G

## (undated) DEVICE — SOL NACL 0.9PCT 1000ML

## (undated) DEVICE — SYR LUERLOK 50ML

## (undated) DEVICE — IRRIGATOR BULB ASEPTO 60CC STRL

## (undated) DEVICE — MANIP UTER ADVINCULA DELINEATOR W/ 3CM SS CUP

## (undated) DEVICE — ADAPT CLN BIOGUARD AIR/H2O DISP

## (undated) DEVICE — LAPAROSCOPIC DISSECTOR: Brand: DEROYAL

## (undated) DEVICE — SENSR O2 OXIMAX FNGR A/ 18IN NONSTR

## (undated) DEVICE — GLV SURG BIOGEL LTX PF 6 1/2

## (undated) DEVICE — TOTAL TRAY, 16FR 10ML SIL FOLEY, URN: Brand: MEDLINE

## (undated) DEVICE — SUTURING DEVICE: Brand: ENDO STITCH

## (undated) DEVICE — 3M™ STERI-DRAPE™ INSTRUMENT POUCH 1018L: Brand: STERI-DRAPE™

## (undated) DEVICE — TUBING, SUCTION, 1/4" X 10', STRAIGHT: Brand: MEDLINE

## (undated) DEVICE — ST IRR CYSTO W/SPK 77IN LF

## (undated) DEVICE — DEV COND GAS LAP INSUFLOW W/LUER CONN

## (undated) DEVICE — PROB ABL ENDOMTRL NOVASURE/G4 W/SURESND

## (undated) DEVICE — SKIN PREP TRAY W/CHG: Brand: MEDLINE INDUSTRIES, INC.

## (undated) DEVICE — ENDOPATH XCEL BLADELESS TROCARS WITH STABILITY SLEEVES: Brand: ENDOPATH XCEL

## (undated) DEVICE — LAPAROSCOPIC SMOKE FILTRATION SYSTEM: Brand: PALL LAPAROSHIELD® PLUS LAPAROSCOPIC SMOKE FILTRATION SYSTEM

## (undated) DEVICE — JELLY,LUBE,STERILE,FLIP TOP,TUBE,4-OZ: Brand: MEDLINE

## (undated) DEVICE — PATIENT RETURN ELECTRODE, SINGLE-USE, CONTACT QUALITY MONITORING, ADULT, WITH 9FT CORD, FOR PATIENTS WEIGING OVER 33LBS. (15KG): Brand: MEGADYNE

## (undated) DEVICE — STRAP STIRUP WO/ RNG

## (undated) DEVICE — STRIP CLS WND CURAD MEDI/STRIP HYPOALLERG 0.25X4IN PK/10

## (undated) DEVICE — LAPAROVUE VISIBILITY SYSTEM LAPAROSCOPIC SOLUTIONS: Brand: LAPAROVUE

## (undated) DEVICE — ENDOPATH PNEUMONEEDLE INSUFFLATION NEEDLES WITH LUER LOCK CONNECTORS 120MM: Brand: ENDOPATH

## (undated) DEVICE — KT ORCA ORCAPOD DISP STRL

## (undated) DEVICE — NEEDLE, QUINCKE, 20GX3.5": Brand: MEDLINE

## (undated) DEVICE — LOU D & C HYSTEROSCOPY: Brand: MEDLINE INDUSTRIES, INC.

## (undated) DEVICE — CAPS TRANSMTR ENDOSC PH MONTR BRAVO

## (undated) DEVICE — GLV SURG SIGNATURE ESSENTIAL PF LTX SZ6.5

## (undated) DEVICE — VAGINAL PACKING: Brand: DEROYAL

## (undated) DEVICE — LN SMPL CO2 SHTRM SD STREAM W/M LUER

## (undated) DEVICE — TUBING, SUCTION, 1/4" X 20', STRAIGHT: Brand: MEDLINE INDUSTRIES, INC.

## (undated) DEVICE — SUT VIC 0 CT1 CR8 18IN DYED J740D

## (undated) DEVICE — BITEBLOCK OMNI BLOC

## (undated) DEVICE — ANTIBACTERIAL UNDYED BRAIDED (POLYGLACTIN 910), SYNTHETIC ABSORBABLE SUTURE: Brand: COATED VICRYL

## (undated) DEVICE — CANN O2 ETCO2 FITS ALL CONN CO2 SMPL A/ 7IN DISP LF

## (undated) DEVICE — MEDI-VAC YANKAUER SUCTION HANDLE W/BULBOUS TIP: Brand: CARDINAL HEALTH

## (undated) DEVICE — ENDOPATH XCEL UNIVERSAL TROCAR STABLILITY SLEEVES: Brand: ENDOPATH XCEL